# Patient Record
Sex: MALE | Race: ASIAN | NOT HISPANIC OR LATINO | URBAN - METROPOLITAN AREA
[De-identification: names, ages, dates, MRNs, and addresses within clinical notes are randomized per-mention and may not be internally consistent; named-entity substitution may affect disease eponyms.]

---

## 2019-03-30 ENCOUNTER — EMERGENCY (EMERGENCY)
Facility: HOSPITAL | Age: 24
LOS: 1 days | Discharge: HOME | End: 2019-03-30
Attending: EMERGENCY MEDICINE | Admitting: EMERGENCY MEDICINE
Payer: COMMERCIAL

## 2019-03-30 VITALS
DIASTOLIC BLOOD PRESSURE: 66 MMHG | SYSTOLIC BLOOD PRESSURE: 119 MMHG | RESPIRATION RATE: 18 BRPM | HEART RATE: 92 BPM | TEMPERATURE: 98 F | OXYGEN SATURATION: 100 %

## 2019-03-30 LAB
ALBUMIN SERPL ELPH-MCNC: 4.9 G/DL — SIGNIFICANT CHANGE UP (ref 3.3–5)
ALP SERPL-CCNC: 66 U/L — SIGNIFICANT CHANGE UP (ref 40–120)
ALT FLD-CCNC: 13 U/L — SIGNIFICANT CHANGE UP (ref 4–41)
ANION GAP SERPL CALC-SCNC: 14 MMO/L — SIGNIFICANT CHANGE UP (ref 7–14)
AST SERPL-CCNC: 23 U/L — SIGNIFICANT CHANGE UP (ref 4–40)
BILIRUB SERPL-MCNC: 0.5 MG/DL — SIGNIFICANT CHANGE UP (ref 0.2–1.2)
BUN SERPL-MCNC: 11 MG/DL — SIGNIFICANT CHANGE UP (ref 7–23)
CALCIUM SERPL-MCNC: 10 MG/DL — SIGNIFICANT CHANGE UP (ref 8.4–10.5)
CHLORIDE SERPL-SCNC: 101 MMOL/L — SIGNIFICANT CHANGE UP (ref 98–107)
CO2 SERPL-SCNC: 27 MMOL/L — SIGNIFICANT CHANGE UP (ref 22–31)
CREAT SERPL-MCNC: 1.04 MG/DL — SIGNIFICANT CHANGE UP (ref 0.5–1.3)
GLUCOSE SERPL-MCNC: 87 MG/DL — SIGNIFICANT CHANGE UP (ref 70–99)
HCT VFR BLD CALC: 49.4 % — SIGNIFICANT CHANGE UP (ref 39–50)
HGB BLD-MCNC: 15.9 G/DL — SIGNIFICANT CHANGE UP (ref 13–17)
MCHC RBC-ENTMCNC: 28.3 PG — SIGNIFICANT CHANGE UP (ref 27–34)
MCHC RBC-ENTMCNC: 32.2 % — SIGNIFICANT CHANGE UP (ref 32–36)
MCV RBC AUTO: 87.9 FL — SIGNIFICANT CHANGE UP (ref 80–100)
NRBC # FLD: 0 K/UL — SIGNIFICANT CHANGE UP (ref 0–0)
PLATELET # BLD AUTO: 164 K/UL — SIGNIFICANT CHANGE UP (ref 150–400)
PMV BLD: 9.8 FL — SIGNIFICANT CHANGE UP (ref 7–13)
POTASSIUM SERPL-MCNC: 4.9 MMOL/L — SIGNIFICANT CHANGE UP (ref 3.5–5.3)
POTASSIUM SERPL-SCNC: 4.9 MMOL/L — SIGNIFICANT CHANGE UP (ref 3.5–5.3)
PROT SERPL-MCNC: 7.9 G/DL — SIGNIFICANT CHANGE UP (ref 6–8.3)
RBC # BLD: 5.62 M/UL — SIGNIFICANT CHANGE UP (ref 4.2–5.8)
RBC # FLD: 12.3 % — SIGNIFICANT CHANGE UP (ref 10.3–14.5)
SODIUM SERPL-SCNC: 142 MMOL/L — SIGNIFICANT CHANGE UP (ref 135–145)
WBC # BLD: 8.33 K/UL — SIGNIFICANT CHANGE UP (ref 3.8–10.5)
WBC # FLD AUTO: 8.33 K/UL — SIGNIFICANT CHANGE UP (ref 3.8–10.5)

## 2019-03-30 PROCEDURE — 99284 EMERGENCY DEPT VISIT MOD MDM: CPT

## 2019-03-30 PROCEDURE — 70491 CT SOFT TISSUE NECK W/DYE: CPT | Mod: 26

## 2019-03-30 RX ORDER — ACETAMINOPHEN 500 MG
975 TABLET ORAL ONCE
Qty: 0 | Refills: 0 | Status: COMPLETED | OUTPATIENT
Start: 2019-03-30 | End: 2019-03-30

## 2019-03-30 RX ORDER — IBUPROFEN 200 MG
400 TABLET ORAL ONCE
Qty: 0 | Refills: 0 | Status: COMPLETED | OUTPATIENT
Start: 2019-03-30 | End: 2019-03-30

## 2019-03-30 RX ORDER — ACETAMINOPHEN 500 MG
1 TABLET ORAL
Qty: 50 | Refills: 0 | OUTPATIENT
Start: 2019-03-30

## 2019-03-30 RX ADMIN — Medication 975 MILLIGRAM(S): at 19:34

## 2019-03-30 RX ADMIN — Medication 400 MILLIGRAM(S): at 19:35

## 2019-03-30 RX ADMIN — Medication 300 MILLIGRAM(S): at 19:34

## 2019-03-30 NOTE — ED PROVIDER NOTE - NSFOLLOWUPINSTRUCTIONS_ED_ALL_ED_FT
Take medications as prescribed. Follow up with ENT about this issue (these issues): right supraclavicular abscess. Return if fevers or if pus re-accumulates. Take medications as prescribed. Keep are dry. Return in 2 days for wound check and packing change. Follow up with ENT about this issue (these issues): right supraclavicular abscess. Return if fevers or if pus re-accumulates.

## 2019-03-30 NOTE — ED PROVIDER NOTE - CLINICAL SUMMARY MEDICAL DECISION MAKING FREE TEXT BOX
22 y/o M with no significant PMHx presents to ED with a cyst on the right side of the neck since 3 weeks ago. Plan: obtain US and reassess. 22 y/o M with no significant PMHx presents to ED with a cyst on the right side of the neck x years, increasing in size/redness/warmth/tenderness since 3 weeks ago. DDx: infected branchial cleft cyst, scrofula, actinomycosis, abscess, necrotic LN. Plan: obtain US and reassess.

## 2019-03-30 NOTE — ED ADULT TRIAGE NOTE - CHIEF COMPLAINT QUOTE
Pt sent by PCP for eval of cyst vs lipoma to right side of neck. Pt reports swelling x 1 year, more severe and painful in the past 2 weeks. Pt also states "I have stitches in my side that I never got removed". Noted with healed incision, states he was supposed to have stitches removed 2 weeks after and never went, this was 2 years ago.

## 2019-04-01 ENCOUNTER — EMERGENCY (EMERGENCY)
Facility: HOSPITAL | Age: 24
LOS: 1 days | Discharge: HOME | End: 2019-04-01
Attending: EMERGENCY MEDICINE | Admitting: EMERGENCY MEDICINE

## 2019-04-01 VITALS
HEART RATE: 62 BPM | OXYGEN SATURATION: 98 % | RESPIRATION RATE: 18 BRPM | DIASTOLIC BLOOD PRESSURE: 57 MMHG | TEMPERATURE: 98 F | SYSTOLIC BLOOD PRESSURE: 115 MMHG

## 2019-04-01 NOTE — ED PROVIDER NOTE - OBJECTIVE STATEMENT
22 y/o male with no pertinent PMHx presents to the ED for a wound check. Pt states having a wound on the R shoulder, Pt notes having an abscess that was drained x 2 days ago with packing in placed. Since then Pt denies any pain, N/V, fever, or chills. Of note Pt taking a Abx for wound.

## 2019-04-02 PROBLEM — Z78.9 OTHER SPECIFIED HEALTH STATUS: Chronic | Status: ACTIVE | Noted: 2019-03-30

## 2019-10-24 NOTE — ED PROVIDER NOTE - CROS ED ROS STATEMENT
----- Message from Jostin Acosta sent at 10/24/2019 10:09 AM CDT -----  Contact: Pt/Mom  Please give mom a call back at .[phone] (home) she is calling to have the pt nurse visit rescheduled.   all other ROS negative except as per HPI

## 2022-02-24 ENCOUNTER — OUTPATIENT (OUTPATIENT)
Dept: OUTPATIENT SERVICES | Facility: HOSPITAL | Age: 27
LOS: 1 days | Discharge: HOME | End: 2022-02-24

## 2022-03-02 ENCOUNTER — OUTPATIENT (OUTPATIENT)
Dept: OUTPATIENT SERVICES | Facility: HOSPITAL | Age: 27
LOS: 1 days | Discharge: HOME | End: 2022-03-02

## 2022-03-03 DIAGNOSIS — F12.20 CANNABIS DEPENDENCE, UNCOMPLICATED: ICD-10-CM

## 2022-03-03 DIAGNOSIS — F15.20 OTHER STIMULANT DEPENDENCE, UNCOMPLICATED: ICD-10-CM

## 2022-03-08 NOTE — ED PROVIDER NOTE - PRINCIPAL DIAGNOSIS
Chief Complaint  Hypertension (Follow up ), Hyperlipidemia, and Diabetes    Subjective  Patient is a 50-year-old male who is here today to follow-up on type 2 diabetes.  He takes Metformin 1000 mg twice daily, Januvia 100 mg once daily, and glipizide 5 mg once daily.  He is reporting some blood sugars as low as 60 intermittently.  He would like to see about stopping the glipizide but continuing the Metformin and Januvia.  He has had some success with intentional weight loss and his diet has improved.  His last hemoglobin A1c was 6.2%    For hyperlipidemia he takes simvastatin 20 mg daily.  He denies side effects and requests refills.    Cardiology manages his hypertension.  He has not had colon cancer screening.  His family history is negative for polyps or colon cancer.  Discuss colonoscopy versus Cologuard testing advantages and disadvantages of each test.  He will let me know if he wants a referral for colon cancer screening.    Family history is negative for prostate cancer.  He denies any difficulty with starting or stopping his urinary stream or dysuria.        Moe SIMMONS Kaylee presents to [facility]  History of Present Illness    Past Medical History:   Diagnosis Date   • Abnormal results of liver function studies    • COVID-19 10/2020   • Enlarged aorta (HCC)    • Essential (primary) hypertension    • Mixed hyperlipidemia    • Obstructive sleep apnea (adult) (pediatric)     SLEEP STUDY   • Type 2 diabetes mellitus with hyperglycemia (HCC)    • Vitamin D deficiency        No Known Allergies     Past Surgical History:   Procedure Laterality Date   • FOOT FRACTURE SURGERY Left 1999   • ROTATOR CUFF REPAIR  2006        Social History     Tobacco Use   • Smoking status: Former Smoker     Packs/day: 1.00     Years: 8.00     Pack years: 8.00     Types: Cigarettes     Start date:      Quit date:      Years since quittin.2   • Smokeless tobacco: Never Used  "  Substance Use Topics   • Alcohol use: Yes     Comment: occassionally        Family History   Problem Relation Age of Onset   • Diabetes type II Mother    • Hypertension Father         Last Completed Pap Smear     This patient has no relevant Health Maintenance data.          Last Completed Mammogram     This patient has no relevant Health Maintenance data.          Last Completed Colonoscopy     This patient has no relevant Health Maintenance data.            Current Outpatient Medications:   •  albuterol sulfate  (90 Base) MCG/ACT inhaler, Inhale 2 puffs Every 4 (Four) Hours As Needed., Disp: , Rfl:   •  amLODIPine (NORVASC) 5 MG tablet, Take 5 mg by mouth Daily., Disp: , Rfl:   •  lisinopril (PRINIVIL,ZESTRIL) 40 MG tablet, Take 40 mg by mouth Daily., Disp: , Rfl:   •  metFORMIN (GLUCOPHAGE) 1000 MG tablet, Take 1 tablet by mouth 2 (Two) Times a Day., Disp: 180 tablet, Rfl: 1  •  multivitamin with minerals tablet tablet, Take 1 tablet by mouth Daily., Disp: , Rfl:   •  simvastatin (ZOCOR) 20 MG tablet, Take 1 tablet by mouth Daily., Disp: 90 tablet, Rfl: 1  •  SITagliptin (Januvia) 100 MG tablet, Take 1 tablet by mouth Daily., Disp: 90 tablet, Rfl: 1  •  glipizide (GLUCOTROL) 5 MG tablet, TAKE ONE TABLET BY MOUTH EVERY DAY, Disp: 90 tablet, Rfl: 0    Immunization History   Administered Date(s) Administered   • Tdap 09/24/2018         Review of Systems   Constitutional: Negative.    Respiratory: Negative.    Cardiovascular: Negative.    Gastrointestinal: Negative.    Genitourinary: Negative.    Psychiatric/Behavioral: Negative.         Objective       /76 (BP Location: Left arm, Patient Position: Sitting, Cuff Size: Large Adult)   Pulse 72   Ht 180.3 cm (71\")   Wt 121 kg (266 lb 12.8 oz)   SpO2 97%   BMI 37.21 kg/m²         Physical Exam  Vitals reviewed.   Constitutional:       General: He is not in acute distress.     Appearance: Normal appearance. He is well-developed.   Cardiovascular:      " Rate and Rhythm: Normal rate and regular rhythm.      Heart sounds: Normal heart sounds.   Pulmonary:      Effort: Pulmonary effort is normal.      Breath sounds: Normal breath sounds.   Musculoskeletal:      Right lower leg: No edema.      Left lower leg: No edema.   Skin:     General: Skin is warm and dry.   Neurological:      General: No focal deficit present.      Mental Status: He is alert.   Psychiatric:         Attention and Perception: Attention normal.         Mood and Affect: Mood and affect normal.         Behavior: Behavior normal.           Result Review :     The following data was reviewed by: LUPILLO Larson on 03/08/2022:    CMP    CMP 8/10/21 3/8/22   Glucose 119 (A) 135 (A)   BUN 12 9   Creatinine 0.88 0.74 (A)   eGFR Non African Am 92    Sodium 139 140   Potassium 4.6 4.5   Chloride 104 103   Calcium 9.3 9.7   Albumin 4.60 4.70   Total Bilirubin 0.4 0.5   Alkaline Phosphatase 62 62   AST (SGOT) 22 19   ALT (SGPT) 22 23   (A) Abnormal value                  Lipid Panel    Lipid Panel 8/10/21 3/8/22   Total Cholesterol 146 139   Triglycerides 60 49   HDL Cholesterol 47 52   VLDL Cholesterol 12 11   LDL Cholesterol  87 76   LDL/HDL Ratio 1.85 1.48               Most Recent A1C    HGBA1C Most Recent 3/8/22   Hemoglobin A1C 6.80 (A)   (A) Abnormal value                            Assessment and Plan        Diagnoses and all orders for this visit:    1. Type 2 diabetes mellitus without complication, without long-term current use of insulin (HCC) (Primary)  Assessment & Plan:  As long as hemoglobin A1c is less than 7%, will anticipate he will stop glipizide and remain on Metformin and Januvia with a repeat of his labs in 3 months.  Further direction will be provided after reviewing his labs.    Orders:  -     Comprehensive metabolic panel; Future  -     Hemoglobin A1c; Future  -     Lipid panel; Future  -     metFORMIN (GLUCOPHAGE) 1000 MG tablet; Take 1 tablet by mouth 2 (Two) Times a Day.   Dispense: 180 tablet; Refill: 1  -     SITagliptin (Januvia) 100 MG tablet; Take 1 tablet by mouth Daily.  Dispense: 90 tablet; Refill: 1    2. Mixed hyperlipidemia  -     simvastatin (ZOCOR) 20 MG tablet; Take 1 tablet by mouth Daily.  Dispense: 90 tablet; Refill: 1    3. Essential hypertension  Assessment & Plan:  Continue care per cardiology                Follow Up     No follow-ups on file.    Patient was given instructions and counseling regarding his condition or for health maintenance advice. Please see specific information pulled into the AVS if appropriate.               Neck abscess

## 2022-08-03 ENCOUNTER — EMERGENCY (EMERGENCY)
Facility: HOSPITAL | Age: 27
LOS: 1 days | Discharge: HOME | End: 2022-08-03
Attending: EMERGENCY MEDICINE | Admitting: EMERGENCY MEDICINE

## 2022-08-03 VITALS
RESPIRATION RATE: 17 BRPM | HEART RATE: 64 BPM | DIASTOLIC BLOOD PRESSURE: 53 MMHG | TEMPERATURE: 98 F | OXYGEN SATURATION: 100 % | SYSTOLIC BLOOD PRESSURE: 93 MMHG

## 2022-08-03 LAB — PCP SPEC-MCNC: SIGNIFICANT CHANGE UP

## 2022-08-03 PROCEDURE — 99284 EMERGENCY DEPT VISIT MOD MDM: CPT

## 2022-08-03 NOTE — ED PROVIDER NOTE - PATIENT PORTAL LINK FT
You can access the FollowMyHealth Patient Portal offered by [facility] by registering at the following website: [email or web address]. By joining [employer]’s FollowMyHealth portal, you will also be able to view your health information using other applications (apps) compatible with our system.

## 2022-08-03 NOTE — ED ADULT TRIAGE NOTE - CHIEF COMPLAINT QUOTE
Pt. states 10 minutes PTA, he started feeling lightheaded. Endorse feeling SOB. Denies chest pain, Denies past medical history. EKG in progress. Patient has flat affect in triage, asking "Are they going to cut me open?" Patient deneis any drug or alcohol use. Pt. states 10 minutes PTA, he started feeling lightheaded. Endorse feeling SOB. Denies chest pain, Denies past medical history. EKG in progress. Patient has flat affect in triage, asking "Are they going to cut me open?" Patient deneis any drug or alcohol use.  ADDENDUM: MD Ramesh called for psych evaluation, asper MD Ramesh patient to be seen in .
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
## 2022-08-03 NOTE — ED PROVIDER NOTE - PROGRESS NOTE DETAILS
jaycee: pt stable, calm and collected, received collatoral via brother who states pt can f/u with pmd and is no harm to self or others. pt provided crisis center f/u for tomorrow am appt.

## 2022-08-03 NOTE — ED BEHAVIORAL HEALTH NOTE - BEHAVIORAL HEALTH NOTE
As per request of provider, writer contacted patient’s brother Rocky howell ([phone]) for collateral information. The following information is per the brother.    Patient is a 25 YO male domiciled w/ girlfriend. Patient BIB taxi paid for by brother due to feeling dizzy, fast heart rate, didn’t feel well and wanted to go to the hospital. Patient had called the brother when he started to feel this way. Brother reports patient has no psych hx and is not currently in treatment. Patient has a hx of anxiety. Patient did not endorse any si/hi/avh and has no hx of this. 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 Prior to today, the brother saw the patient a few days ago and reports he seemed fine. Patient has been eating and showering. Patient’s sleep is poor. Sometimes the patient sleeps too much and sometimes not enough as per the brother. No medical problems reported, and patient is covid vaccinated. Brother reports patient has gained 20 lbs over the past 2 months. Brother has no safety concerns and feels the patient is able to care for himself. Brother reports he can pick the patient up when cleared for discharge.
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
## 2022-08-03 NOTE — ED ADULT NURSE NOTE - CHIEF COMPLAINT QUOTE
Pt. states 10 minutes PTA, he started feeling lightheaded. Endorse feeling SOB. Denies chest pain, Denies past medical history. EKG in progress. Patient has flat affect in triage, asking "Are they going to cut me open?" Patient deneis any drug or alcohol use.  ADDENDUM: MD Ramesh called for psych evaluation, asper MD Ramesh patient to be seen in .
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
## 2022-08-04 VITALS
SYSTOLIC BLOOD PRESSURE: 105 MMHG | OXYGEN SATURATION: 100 % | DIASTOLIC BLOOD PRESSURE: 62 MMHG | HEART RATE: 62 BPM | RESPIRATION RATE: 18 BRPM

## 2022-08-04 LAB
AMPHET UR-MCNC: NEGATIVE — SIGNIFICANT CHANGE UP
BARBITURATES UR SCN-MCNC: NEGATIVE — SIGNIFICANT CHANGE UP
BENZODIAZ UR-MCNC: NEGATIVE — SIGNIFICANT CHANGE UP
COCAINE METAB.OTHER UR-MCNC: NEGATIVE — SIGNIFICANT CHANGE UP
CREATININE URINE RESULT, DAU: 241 MG/DL — SIGNIFICANT CHANGE UP
METHADONE UR-MCNC: NEGATIVE — SIGNIFICANT CHANGE UP
OPIATES UR-MCNC: NEGATIVE — SIGNIFICANT CHANGE UP
OXYCODONE UR-MCNC: NEGATIVE — SIGNIFICANT CHANGE UP
PCP UR-MCNC: NEGATIVE — SIGNIFICANT CHANGE UP
THC UR QL: NEGATIVE — SIGNIFICANT CHANGE UP

## 2022-08-04 NOTE — ED ADULT NURSE REASSESSMENT NOTE - NS ED NURSE REASSESS COMMENT FT1
Pt denies any complaints at this time, belongings returned, DC papers provided, and appears in no obv distress. Pt states "I live like 5 minutes away so ill walk home."

## 2022-08-10 ENCOUNTER — EMERGENCY (EMERGENCY)
Facility: HOSPITAL | Age: 27
LOS: 1 days | Discharge: HOME | End: 2022-08-10
Attending: EMERGENCY MEDICINE | Admitting: EMERGENCY MEDICINE

## 2022-08-10 VITALS
SYSTOLIC BLOOD PRESSURE: 117 MMHG | HEART RATE: 84 BPM | TEMPERATURE: 98 F | RESPIRATION RATE: 18 BRPM | DIASTOLIC BLOOD PRESSURE: 65 MMHG | OXYGEN SATURATION: 99 %

## 2022-08-10 PROCEDURE — 99284 EMERGENCY DEPT VISIT MOD MDM: CPT

## 2022-08-10 PROCEDURE — 93971 EXTREMITY STUDY: CPT | Mod: 26,LT

## 2022-08-10 RX ORDER — ACETAMINOPHEN 500 MG
975 TABLET ORAL ONCE
Refills: 0 | Status: COMPLETED | OUTPATIENT
Start: 2022-08-10 | End: 2022-08-10

## 2022-08-10 RX ADMIN — Medication 975 MILLIGRAM(S): at 20:58

## 2022-08-10 NOTE — ED PROVIDER NOTE - PHYSICAL EXAMINATION
GEN - NAD; well appearing; A+O x3   HEAD - NC/AT   EYES- PERRL, EOMI  ENT: Airway patent, mmm  NECK: Neck supple  PULMONARY - CTA b/l, symmetric breath sounds.   CARDIAC -s1s2, RRR, no M,G,R  EXTREMITIES - FROM, symmetric pulses, capillary refill < 2 seconds, RLE slightly larger than LLE with scattered area of hypopigmentation, WWP, no erythema, no pitting edema, DP pulse 2+, SILT throughout   NEUROLOGIC - alert, speech clear, full strength and sensation to RLE  PSYCH -nl mood/affect, nl insight.

## 2022-08-10 NOTE — ED ADULT NURSE NOTE - OBJECTIVE STATEMENT
Received pt in intake room 15. pt A&OX3, ambulatory. pt c/o right leg pain. pt with discoloration to the right leg states has been there x a few years, with mild swelling. pt is concerned has a blood clot. pt in no distress. denies any palpitations, chest pain, sob, cough, fever/chills, headache, dizziness, n/v. respirations are equal and nonlabored, no respiratory distress noted. pt stable, medicated as per orders. awaiting ultrasound.

## 2022-08-10 NOTE — ED PROVIDER NOTE - CLINICAL SUMMARY MEDICAL DECISION MAKING FREE TEXT BOX
Patient presents with 1 year of right lower extremity swelling and discoloration, atraumatic.  Neurovascular intact, plan for ultrasound to eval for DVT, vascular follow-up.

## 2022-08-10 NOTE — ED PROVIDER NOTE - PATIENT PORTAL LINK FT
You can access the FollowMyHealth Patient Portal offered by [facility] by registering at the following website: [email or web address]. By joining [employer]’s FollowMyHealth portal, you will also be able to view your health information using other applications (apps) compatible with our system.

## 2022-08-10 NOTE — ED ADULT TRIAGE NOTE - CHIEF COMPLAINT QUOTE
pt co pain to right leg states his legs have had discoloration x few years. Pt with mild swelling to right leg. pt concern that he may have a blood clot in right leg. Pt denies chest pain or sob.

## 2022-08-10 NOTE — ED PROVIDER NOTE - NSFOLLOWUPINSTRUCTIONS_ED_ALL_ED_FT
Your ultrasound did not show any signs of a blood clot, you will be contacted by our discharge planner to help you get follow-up with a vascular doctor to further evaluate your leg pain and skin changes.

## 2022-08-10 NOTE — ED PROVIDER NOTE - OBJECTIVE STATEMENT
26 male presents with right leg swelling and discoloration x1 year.  Denies any injuries, denies numbness tingling or weakness, denies trauma.  Has not seen a doctor for this issue before.  Patient reports he is concerned that he may have a blood clot and asked why he came to the ER tonight.  Denies chest pain, shortness of breath, fevers chills sweats, cough, back pain.

## 2022-10-26 NOTE — ED PROVIDER NOTE - CPE EDP PSYCH NORM
Other (Free Text): 2 Samples of cosentyx given to patient Detail Level: Zone Note Text (......Xxx Chief Complaint.): This diagnosis correlates with the Render Risk Assessment In Note?: no normal...

## 2023-03-30 ENCOUNTER — NON-APPOINTMENT (OUTPATIENT)
Age: 28
End: 2023-03-30

## 2023-07-01 ENCOUNTER — INPATIENT (INPATIENT)
Age: 28
LOS: 3 days | Discharge: HOME | End: 2023-07-05
Attending: STUDENT IN AN ORGANIZED HEALTH CARE EDUCATION/TRAINING PROGRAM | Admitting: STUDENT IN AN ORGANIZED HEALTH CARE EDUCATION/TRAINING PROGRAM
Payer: COMMERCIAL

## 2023-07-01 VITALS
RESPIRATION RATE: 19 BRPM | OXYGEN SATURATION: 99 % | SYSTOLIC BLOOD PRESSURE: 137 MMHG | DIASTOLIC BLOOD PRESSURE: 80 MMHG | TEMPERATURE: 99 F | HEART RATE: 97 BPM

## 2023-07-01 DIAGNOSIS — M79.89 OTHER SPECIFIED SOFT TISSUE DISORDERS: ICD-10-CM

## 2023-07-01 DIAGNOSIS — L03.90 CELLULITIS, UNSPECIFIED: ICD-10-CM

## 2023-07-01 LAB
ALBUMIN SERPL ELPH-MCNC: 4.4 G/DL — SIGNIFICANT CHANGE UP (ref 3.3–5)
ALP SERPL-CCNC: 79 U/L — SIGNIFICANT CHANGE UP (ref 40–120)
ALT FLD-CCNC: 19 U/L — SIGNIFICANT CHANGE UP (ref 4–41)
ANION GAP SERPL CALC-SCNC: 14 MMOL/L — SIGNIFICANT CHANGE UP (ref 7–14)
APAP SERPL-MCNC: <10 UG/ML — LOW (ref 15–25)
AST SERPL-CCNC: 22 U/L — SIGNIFICANT CHANGE UP (ref 4–40)
BASE EXCESS BLDV CALC-SCNC: 1.3 MMOL/L — SIGNIFICANT CHANGE UP (ref -2–3)
BASOPHILS # BLD AUTO: 0.05 K/UL — SIGNIFICANT CHANGE UP (ref 0–0.2)
BASOPHILS NFR BLD AUTO: 0.5 % — SIGNIFICANT CHANGE UP (ref 0–2)
BILIRUB SERPL-MCNC: 0.3 MG/DL — SIGNIFICANT CHANGE UP (ref 0.2–1.2)
BLOOD GAS VENOUS COMPREHENSIVE RESULT: SIGNIFICANT CHANGE UP
BUN SERPL-MCNC: 16 MG/DL — SIGNIFICANT CHANGE UP (ref 7–23)
CALCIUM SERPL-MCNC: 9.1 MG/DL — SIGNIFICANT CHANGE UP (ref 8.4–10.5)
CHLORIDE BLDV-SCNC: 104 MMOL/L — SIGNIFICANT CHANGE UP (ref 96–108)
CHLORIDE SERPL-SCNC: 102 MMOL/L — SIGNIFICANT CHANGE UP (ref 98–107)
CO2 BLDV-SCNC: 29.7 MMOL/L — HIGH (ref 22–26)
CO2 SERPL-SCNC: 24 MMOL/L — SIGNIFICANT CHANGE UP (ref 22–31)
CREAT SERPL-MCNC: 1.05 MG/DL — SIGNIFICANT CHANGE UP (ref 0.5–1.3)
CRP SERPL-MCNC: 5.1 MG/L — HIGH
D DIMER BLD IA.RAPID-MCNC: <150 NG/ML DDU — SIGNIFICANT CHANGE UP
EGFR: 100 ML/MIN/1.73M2 — SIGNIFICANT CHANGE UP
EOSINOPHIL # BLD AUTO: 0.46 K/UL — SIGNIFICANT CHANGE UP (ref 0–0.5)
EOSINOPHIL NFR BLD AUTO: 4.2 % — SIGNIFICANT CHANGE UP (ref 0–6)
ERYTHROCYTE [SEDIMENTATION RATE] IN BLOOD: 4 MM/HR — SIGNIFICANT CHANGE UP (ref 1–15)
ETHANOL SERPL-MCNC: <10 MG/DL — SIGNIFICANT CHANGE UP
GAS PNL BLDV: 137 MMOL/L — SIGNIFICANT CHANGE UP (ref 136–145)
GLUCOSE BLDV-MCNC: 109 MG/DL — HIGH (ref 70–99)
GLUCOSE SERPL-MCNC: 101 MG/DL — HIGH (ref 70–99)
HCO3 BLDV-SCNC: 28 MMOL/L — SIGNIFICANT CHANGE UP (ref 22–29)
HCT VFR BLD CALC: 44.7 % — SIGNIFICANT CHANGE UP (ref 39–50)
HCT VFR BLDA CALC: 44 % — SIGNIFICANT CHANGE UP (ref 39–51)
HGB BLD CALC-MCNC: 14.6 G/DL — SIGNIFICANT CHANGE UP (ref 12.6–17.4)
HGB BLD-MCNC: 14.2 G/DL — SIGNIFICANT CHANGE UP (ref 13–17)
IANC: 7.51 K/UL — HIGH (ref 1.8–7.4)
IMM GRANULOCYTES NFR BLD AUTO: 0.3 % — SIGNIFICANT CHANGE UP (ref 0–0.9)
LACTATE BLDV-MCNC: 1.7 MMOL/L — SIGNIFICANT CHANGE UP (ref 0.5–2)
LYMPHOCYTES # BLD AUTO: 19.6 % — SIGNIFICANT CHANGE UP (ref 13–44)
LYMPHOCYTES # BLD AUTO: 2.14 K/UL — SIGNIFICANT CHANGE UP (ref 1–3.3)
MCHC RBC-ENTMCNC: 27.5 PG — SIGNIFICANT CHANGE UP (ref 27–34)
MCHC RBC-ENTMCNC: 31.8 GM/DL — LOW (ref 32–36)
MCV RBC AUTO: 86.5 FL — SIGNIFICANT CHANGE UP (ref 80–100)
MONOCYTES # BLD AUTO: 0.71 K/UL — SIGNIFICANT CHANGE UP (ref 0–0.9)
MONOCYTES NFR BLD AUTO: 6.5 % — SIGNIFICANT CHANGE UP (ref 2–14)
NEUTROPHILS # BLD AUTO: 7.51 K/UL — HIGH (ref 1.8–7.4)
NEUTROPHILS NFR BLD AUTO: 68.9 % — SIGNIFICANT CHANGE UP (ref 43–77)
NRBC # BLD: 0 /100 WBCS — SIGNIFICANT CHANGE UP (ref 0–0)
NRBC # FLD: 0 K/UL — SIGNIFICANT CHANGE UP (ref 0–0)
PCO2 BLDV: 52 MMHG — SIGNIFICANT CHANGE UP (ref 42–55)
PH BLDV: 7.34 — SIGNIFICANT CHANGE UP (ref 7.32–7.43)
PLATELET # BLD AUTO: 355 K/UL — SIGNIFICANT CHANGE UP (ref 150–400)
PO2 BLDV: 36 MMHG — SIGNIFICANT CHANGE UP (ref 25–45)
POTASSIUM BLDV-SCNC: 3.5 MMOL/L — SIGNIFICANT CHANGE UP (ref 3.5–5.1)
POTASSIUM SERPL-MCNC: 3.5 MMOL/L — SIGNIFICANT CHANGE UP (ref 3.5–5.3)
POTASSIUM SERPL-SCNC: 3.5 MMOL/L — SIGNIFICANT CHANGE UP (ref 3.5–5.3)
PROCALCITONIN SERPL-MCNC: 0.03 NG/ML — SIGNIFICANT CHANGE UP (ref 0.02–0.1)
PROT SERPL-MCNC: 7.2 G/DL — SIGNIFICANT CHANGE UP (ref 6–8.3)
RBC # BLD: 5.17 M/UL — SIGNIFICANT CHANGE UP (ref 4.2–5.8)
RBC # FLD: 13.2 % — SIGNIFICANT CHANGE UP (ref 10.3–14.5)
SALICYLATES SERPL-MCNC: <0.3 MG/DL — LOW (ref 15–30)
SAO2 % BLDV: 58 % — LOW (ref 67–88)
SODIUM SERPL-SCNC: 140 MMOL/L — SIGNIFICANT CHANGE UP (ref 135–145)
TOXICOLOGY SCREEN, DRUGS OF ABUSE, SERUM RESULT: SIGNIFICANT CHANGE UP
TOXICOLOGY SCREEN, DRUGS OF ABUSE, SERUM RESULT: SIGNIFICANT CHANGE UP
WBC # BLD: 10.9 K/UL — HIGH (ref 3.8–10.5)
WBC # FLD AUTO: 10.9 K/UL — HIGH (ref 3.8–10.5)

## 2023-07-01 PROCEDURE — 73590 X-RAY EXAM OF LOWER LEG: CPT | Mod: 26,RT

## 2023-07-01 PROCEDURE — 99285 EMERGENCY DEPT VISIT HI MDM: CPT

## 2023-07-01 PROCEDURE — 99223 1ST HOSP IP/OBS HIGH 75: CPT

## 2023-07-01 RX ORDER — ENOXAPARIN SODIUM 100 MG/ML
95 INJECTION SUBCUTANEOUS EVERY 12 HOURS
Refills: 0 | Status: DISCONTINUED | OUTPATIENT
Start: 2023-07-01 | End: 2023-07-03

## 2023-07-01 RX ORDER — VANCOMYCIN HCL 1 G
1000 VIAL (EA) INTRAVENOUS ONCE
Refills: 0 | Status: COMPLETED | OUTPATIENT
Start: 2023-07-01 | End: 2023-07-01

## 2023-07-01 RX ORDER — PIPERACILLIN AND TAZOBACTAM 4; .5 G/20ML; G/20ML
3.38 INJECTION, POWDER, LYOPHILIZED, FOR SOLUTION INTRAVENOUS ONCE
Refills: 0 | Status: COMPLETED | OUTPATIENT
Start: 2023-07-01 | End: 2023-07-01

## 2023-07-01 RX ORDER — LANOLIN ALCOHOL/MO/W.PET/CERES
3 CREAM (GRAM) TOPICAL AT BEDTIME
Refills: 0 | Status: DISCONTINUED | OUTPATIENT
Start: 2023-07-01 | End: 2023-07-05

## 2023-07-01 RX ORDER — ENOXAPARIN SODIUM 100 MG/ML
40 INJECTION SUBCUTANEOUS EVERY 24 HOURS
Refills: 0 | Status: DISCONTINUED | OUTPATIENT
Start: 2023-07-01 | End: 2023-07-01

## 2023-07-01 RX ORDER — ACETAMINOPHEN 500 MG
650 TABLET ORAL EVERY 6 HOURS
Refills: 0 | Status: DISCONTINUED | OUTPATIENT
Start: 2023-07-01 | End: 2023-07-05

## 2023-07-01 RX ORDER — VANCOMYCIN HCL 1 G
1250 VIAL (EA) INTRAVENOUS EVERY 12 HOURS
Refills: 0 | Status: DISCONTINUED | OUTPATIENT
Start: 2023-07-01 | End: 2023-07-03

## 2023-07-01 RX ORDER — PIPERACILLIN AND TAZOBACTAM 4; .5 G/20ML; G/20ML
3.38 INJECTION, POWDER, LYOPHILIZED, FOR SOLUTION INTRAVENOUS EVERY 8 HOURS
Refills: 0 | Status: DISCONTINUED | OUTPATIENT
Start: 2023-07-01 | End: 2023-07-04

## 2023-07-01 RX ORDER — ONDANSETRON 8 MG/1
4 TABLET, FILM COATED ORAL EVERY 8 HOURS
Refills: 0 | Status: DISCONTINUED | OUTPATIENT
Start: 2023-07-01 | End: 2023-07-05

## 2023-07-01 RX ADMIN — Medication 250 MILLIGRAM(S): at 23:38

## 2023-07-01 RX ADMIN — PIPERACILLIN AND TAZOBACTAM 25 GRAM(S): 4; .5 INJECTION, POWDER, LYOPHILIZED, FOR SOLUTION INTRAVENOUS at 19:10

## 2023-07-01 RX ADMIN — Medication 250 MILLIGRAM(S): at 05:01

## 2023-07-01 RX ADMIN — PIPERACILLIN AND TAZOBACTAM 200 GRAM(S): 4; .5 INJECTION, POWDER, LYOPHILIZED, FOR SOLUTION INTRAVENOUS at 04:24

## 2023-07-01 RX ADMIN — ENOXAPARIN SODIUM 95 MILLIGRAM(S): 100 INJECTION SUBCUTANEOUS at 23:38

## 2023-07-01 NOTE — H&P ADULT - NSHPREVIEWOFSYSTEMS_GEN_ALL_CORE
Review of Systems:   CONSTITUTIONAL: No fever, weight loss, or fatigue  EYES: No eye pain, visual disturbances, or discharge  ENMT:  No difficulty hearing, tinnitus, vertigo; No sinus or throat pain  NECK: No pain or stiffness  BREASTS: No pain, masses, or nipple discharge  RESPIRATORY: No cough, wheezing, chills or hemoptysis; No shortness of breath  CARDIOVASCULAR: No chest pain, palpitations, dizziness, or leg swelling  GASTROINTESTINAL: No abdominal or epigastric pain. No nausea, vomiting, or hematemesis; No diarrhea or constipation. No melena or hematochezia.  GENITOURINARY: No dysuria, frequency, hematuria, or incontinence  NEUROLOGICAL: No headaches, memory loss, loss of strength, numbness, or tremors  SKIN: No itching, burning, rashes, or lesions   LYMPH NODES: No enlarged glands  ENDOCRINE: No heat or cold intolerance; No hair loss  MUSCULOSKELETAL: No joint pain or swelling; No muscle, back, or extremity pain  PSYCHIATRIC: No depression, anxiety, mood swings, or difficulty sleeping  HEME/LYMPH: No easy bruising, or bleeding gums  ALLERY AND IMMUNOLOGIC: No hives or eczema

## 2023-07-01 NOTE — H&P ADULT - HISTORY OF PRESENT ILLNESS
Pt is a 26 yo M w/ no PMHx p/w RT LE swelling. Pt states he noted a few months ago that he had a blister on anterior RT shin which burst. He states he went to [facility] and was prescribed antibiotics. He is unclear if he finished his course of antibiotics. He noted his wounds were getting worse and went to a wound care doctor who prescribed him a course of doxcycline. He comes in today due to worsening pain and swelling.

## 2023-07-01 NOTE — ED ADULT NURSE NOTE - OBJECTIVE STATEMENT
Pt arrives to ED aaox4, ambulatory, breathing even and unlabored in bed. Pt came to ED for infection on right calf. Pt poor historian, pt states he "just woke up with his leg like this" a few weeks ago. Pt states he was given antibiotics for this infection and they "did not work". Pt unable to state what infection he had or what he was treated with. Pt denies chest pain, SOB, dizziness, headache, blurry vision, chills. #18G IV placed in right AC, labs drawn and sent. Bed in lowest position, call bell within reach.

## 2023-07-01 NOTE — H&P ADULT - TIME BILLING
Reviewed lab data, radiology results, consultants' recommendations, documentation in [place], discussed with family, ACP, interdisciplinary staff and/or intervention were performed. d/w ACP Ernie

## 2023-07-01 NOTE — ED PROVIDER NOTE - PHYSICAL EXAMINATION
Skin: eczematous rash noted in suprapubic region and L posterior aspect of upper neck; apparent eczematous rash to anterior aspect of proximal R ankle   - R lower leg with open abrasion to mid- anterior tib/fib area with non-pitting swelling to level of knee, dark discoloration to leg from ankle to upper-mid lower leg   - distal pulses 2+ b/l
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
## 2023-07-01 NOTE — ED ADULT NURSE NOTE - NSFALLUNIVINTERV_ED_ALL_ED
Bed/Stretcher in lowest position, wheels locked, appropriate side rails in place/Call bell, personal items and telephone in reach/Instruct patient to call for assistance before getting out of bed/chair/stretcher/Non-slip footwear applied when patient is off stretcher/[place] to call system/Physically safe environment - no spills, clutter or unnecessary equipment/Purposeful proactive rounding/Room/bathroom lighting operational, light cord in reach

## 2023-07-01 NOTE — H&P ADULT - NSHPLABSRESULTS_GEN_ALL_CORE
14.2   10.90 )-----------( 355      ( 01 Jul 2023 04:20 )             44.7       07-01    140  |  102  |  16  ----------------------------<  101<H>  3.5   |  24  |  1.05    Ca    9.1      01 Jul 2023 04:20    TPro  7.2  /  Alb  4.4  /  TBili  0.3  /  DBili  x   /  AST  22  /  ALT  19  /  AlkPhos  79  07-01      CAPILLARY BLOOD GLUCOSE          Urinalysis Basic - ( 01 Jul 2023 04:20 )    Color: x / Appearance: x / SG: x / pH: x  Gluc: 101 mg/dL / Ketone: x  / Bili: x / Urobili: x   Blood: x / Protein: x / Nitrite: x   Leuk Esterase: x / RBC: x / WBC x   Sq Epi: x / Non Sq Epi: x / Bacteria: x            Radiology  < from: Xray Tibia + Fibula 2 Views, Right (07.01.23 @ 05:19) >    IMPRESSION:  No radiographic evidence of osteomyelitis.

## 2023-07-01 NOTE — H&P ADULT - ASSESSMENT
Pt is a 26 yo M w/ no PMHx p/w RT LE swelling.  Pt is a 26 yo M w/ no PMHx p/w RT LE swelling and multiple ulcers. + hx drug use

## 2023-07-01 NOTE — H&P ADULT - NSHPPHYSICALEXAM_GEN_ALL_CORE
Vital Signs Last 24 Hrs  T(C): 36.7 (01 Jul 2023 13:22), Max: 37.2 (01 Jul 2023 03:05)  T(F): 98.1 (01 Jul 2023 13:22), Max: 99 (01 Jul 2023 03:05)  HR: 70 (01 Jul 2023 13:22) (70 - 97)  BP: 123/71 (01 Jul 2023 13:22) (122/76 - 137/80)  BP(mean): --  RR: 20 (01 Jul 2023 13:22) (15 - 20)  SpO2: 100% (01 Jul 2023 13:22) (99% - 100%)    Parameters below as of 01 Jul 2023 13:22  Patient On (Oxygen Delivery Method): room air        PHYSICAL EXAM:  GENERAL: NAD, well-developed  HEAD:  Atraumatic, Normocephalic  EYES: EOMI, PERRLA, conjunctiva and sclera clear  NECK: Supple, No JVD  CHEST/LUNG: Clear to auscultation bilaterally; No wheeze  HEART: Regular rate and rhythm; No murmurs, rubs, or gallops  ABDOMEN: Soft, Nontender, Nondistended; Bowel sounds present  EXTREMITIES:  2+ Peripheral Pulses, No clubbing, cyanosis, or edema  PSYCH: AAOx3  NEUROLOGY: non-focal  SKIN: No rashes or lesions Vital Signs Last 24 Hrs  T(C): 36.7 (01 Jul 2023 13:22), Max: 37.2 (01 Jul 2023 03:05)  T(F): 98.1 (01 Jul 2023 13:22), Max: 99 (01 Jul 2023 03:05)  HR: 70 (01 Jul 2023 13:22) (70 - 97)  BP: 123/71 (01 Jul 2023 13:22) (122/76 - 137/80)  BP(mean): --  RR: 20 (01 Jul 2023 13:22) (15 - 20)  SpO2: 100% (01 Jul 2023 13:22) (99% - 100%)    Parameters below as of 01 Jul 2023 13:22  Patient On (Oxygen Delivery Method): room air        PHYSICAL EXAM:  GENERAL: NAD, well-developed  HEAD:  Atraumatic, Normocephalic  EYES: EOMI, PERRLA, conjunctiva and sclera clear  NECK: Supple, No JVD  CHEST/LUNG: Clear to auscultation bilaterally; No wheeze  HEART: Regular rate and rhythm; No murmurs, rubs, or gallops  ABDOMEN: Soft, Nontender, Nondistended; Bowel sounds present+  EXT: RT LE swelling w/ stasis changes warm; multiple ulcers in different stages of healing; + RT posterior calf tender  PSYCH: AAOx3  NEUROLOGY: non-focal  SKIN: No rashes or lesions

## 2023-07-01 NOTE — ED ADULT TRIAGE NOTE - CHIEF COMPLAINT QUOTE
Patient has wound to right calf. States, the wound has been there for over a month and he was on antibiotics a month ago. Complaining of mild wound pain. Patient is a poor historian and unable to provide any information about how the wound occurred and treatment history.

## 2023-07-01 NOTE — H&P ADULT - NSHPSOCIALHISTORY_GEN_ALL_CORE
Lives with parents; smokes marijuana/ cocaine and crystal meth use yesterday; occasionally uses xanax; currently unemployed

## 2023-07-01 NOTE — H&P ADULT - PROBLEM SELECTOR PLAN 1
- s/p multiple doses of antibiotics   - c/w empiric dose of antibiotics; vanco/zosyn  - check CRP/ESR/procal/ d- dimer   - check MRSA swab   - f/u Bcx in lab  - US LE r/o DVT - s/p multiple doses of antibiotics; Xray w/o OM   - c/w empiric dose of antibiotics; vanco 1g IV q 12 /zosyn 3.375 q8  - check CRP/ESR/procal/ d- dimer   - check MRSA swab   - f/u Bcx in lab  - STARR/PVR   - wells score 2 for DVT (swelling entire leg; Post calf pain; at least moderate risk) US LE r/o DVT; empiric dosing of full dose lovenox;  until US done.   - + Hx of drug use multiple but states doesn't inject drugs; check Utox/serum tox   - wound care c/s   - derm c/s poss bx called will see in AM

## 2023-07-01 NOTE — ED PROVIDER NOTE - CLINICAL SUMMARY MEDICAL DECISION MAKING FREE TEXT BOX
27M h/o likely eczema p/w worsening RLE wound and swelling x months concerning for cellulitis, failed outpatient abx x2. Given degree of swelling, will obtain RLE duplex to r/o DVT. XRay ordered to assess for free air. VBG with lactate sent. Blood cultures to be obtained. Empiric broad antibiotic covereage with vanc and zosyn provided. Anticipate admission for ongoing antimicrobial administration and monitoring. Strong pulses and distal extremity warm to touch making arterial insufficiency unlikely.

## 2023-07-02 ENCOUNTER — TRANSCRIPTION ENCOUNTER (OUTPATIENT)
Age: 28
End: 2023-07-02

## 2023-07-02 DIAGNOSIS — Z29.9 ENCOUNTER FOR PROPHYLACTIC MEASURES, UNSPECIFIED: ICD-10-CM

## 2023-07-02 DIAGNOSIS — M79.604 PAIN IN RIGHT LEG: ICD-10-CM

## 2023-07-02 DIAGNOSIS — F19.90 OTHER PSYCHOACTIVE SUBSTANCE USE, UNSPECIFIED, UNCOMPLICATED: ICD-10-CM

## 2023-07-02 LAB
ALBUMIN SERPL ELPH-MCNC: 4 G/DL — SIGNIFICANT CHANGE UP (ref 3.3–5)
ALP SERPL-CCNC: 73 U/L — SIGNIFICANT CHANGE UP (ref 40–120)
ALT FLD-CCNC: 15 U/L — SIGNIFICANT CHANGE UP (ref 4–41)
AMPHET UR-MCNC: POSITIVE
ANION GAP SERPL CALC-SCNC: 12 MMOL/L — SIGNIFICANT CHANGE UP (ref 7–14)
AST SERPL-CCNC: 14 U/L — SIGNIFICANT CHANGE UP (ref 4–40)
BARBITURATES UR SCN-MCNC: NEGATIVE — SIGNIFICANT CHANGE UP
BASOPHILS # BLD AUTO: 0.05 K/UL — SIGNIFICANT CHANGE UP (ref 0–0.2)
BASOPHILS NFR BLD AUTO: 0.5 % — SIGNIFICANT CHANGE UP (ref 0–2)
BENZODIAZ UR-MCNC: NEGATIVE — SIGNIFICANT CHANGE UP
BILIRUB SERPL-MCNC: 0.2 MG/DL — SIGNIFICANT CHANGE UP (ref 0.2–1.2)
BUN SERPL-MCNC: 14 MG/DL — SIGNIFICANT CHANGE UP (ref 7–23)
CALCIUM SERPL-MCNC: 9 MG/DL — SIGNIFICANT CHANGE UP (ref 8.4–10.5)
CHLORIDE SERPL-SCNC: 105 MMOL/L — SIGNIFICANT CHANGE UP (ref 98–107)
CO2 SERPL-SCNC: 23 MMOL/L — SIGNIFICANT CHANGE UP (ref 22–31)
COCAINE METAB.OTHER UR-MCNC: POSITIVE
CREAT SERPL-MCNC: 0.89 MG/DL — SIGNIFICANT CHANGE UP (ref 0.5–1.3)
CREATININE URINE RESULT, DAU: 71 MG/DL — SIGNIFICANT CHANGE UP
EGFR: 121 ML/MIN/1.73M2 — SIGNIFICANT CHANGE UP
EOSINOPHIL # BLD AUTO: 0.75 K/UL — HIGH (ref 0–0.5)
EOSINOPHIL NFR BLD AUTO: 8 % — HIGH (ref 0–6)
GLUCOSE SERPL-MCNC: 95 MG/DL — SIGNIFICANT CHANGE UP (ref 70–99)
HCT VFR BLD CALC: 51.2 % — HIGH (ref 39–50)
HGB BLD-MCNC: 16.2 G/DL — SIGNIFICANT CHANGE UP (ref 13–17)
IANC: 3.58 K/UL — SIGNIFICANT CHANGE UP (ref 1.8–7.4)
IMM GRANULOCYTES NFR BLD AUTO: 0.3 % — SIGNIFICANT CHANGE UP (ref 0–0.9)
LYMPHOCYTES # BLD AUTO: 4.38 K/UL — HIGH (ref 1–3.3)
LYMPHOCYTES # BLD AUTO: 46.8 % — HIGH (ref 13–44)
MCHC RBC-ENTMCNC: 28.2 PG — SIGNIFICANT CHANGE UP (ref 27–34)
MCHC RBC-ENTMCNC: 31.6 GM/DL — LOW (ref 32–36)
MCV RBC AUTO: 89 FL — SIGNIFICANT CHANGE UP (ref 80–100)
METHADONE UR-MCNC: NEGATIVE — SIGNIFICANT CHANGE UP
MONOCYTES # BLD AUTO: 0.56 K/UL — SIGNIFICANT CHANGE UP (ref 0–0.9)
MONOCYTES NFR BLD AUTO: 6 % — SIGNIFICANT CHANGE UP (ref 2–14)
MRSA PCR RESULT.: SIGNIFICANT CHANGE UP
NEUTROPHILS # BLD AUTO: 3.58 K/UL — SIGNIFICANT CHANGE UP (ref 1.8–7.4)
NEUTROPHILS NFR BLD AUTO: 38.4 % — LOW (ref 43–77)
NRBC # BLD: 0 /100 WBCS — SIGNIFICANT CHANGE UP (ref 0–0)
NRBC # FLD: 0 K/UL — SIGNIFICANT CHANGE UP (ref 0–0)
OPIATES UR-MCNC: NEGATIVE — SIGNIFICANT CHANGE UP
OXYCODONE UR-MCNC: POSITIVE
PCP SPEC-MCNC: SIGNIFICANT CHANGE UP
PCP UR-MCNC: NEGATIVE — SIGNIFICANT CHANGE UP
PLATELET # BLD AUTO: 363 K/UL — SIGNIFICANT CHANGE UP (ref 150–400)
POTASSIUM SERPL-MCNC: 4 MMOL/L — SIGNIFICANT CHANGE UP (ref 3.5–5.3)
POTASSIUM SERPL-SCNC: 4 MMOL/L — SIGNIFICANT CHANGE UP (ref 3.5–5.3)
PROT SERPL-MCNC: 6.9 G/DL — SIGNIFICANT CHANGE UP (ref 6–8.3)
RBC # BLD: 5.75 M/UL — SIGNIFICANT CHANGE UP (ref 4.2–5.8)
RBC # FLD: 13.3 % — SIGNIFICANT CHANGE UP (ref 10.3–14.5)
S AUREUS DNA NOSE QL NAA+PROBE: DETECTED
SODIUM SERPL-SCNC: 140 MMOL/L — SIGNIFICANT CHANGE UP (ref 135–145)
THC UR QL: POSITIVE
WBC # BLD: 9.35 K/UL — SIGNIFICANT CHANGE UP (ref 3.8–10.5)
WBC # FLD AUTO: 9.35 K/UL — SIGNIFICANT CHANGE UP (ref 3.8–10.5)

## 2023-07-02 PROCEDURE — 99232 SBSQ HOSP IP/OBS MODERATE 35: CPT

## 2023-07-02 PROCEDURE — 99221 1ST HOSP IP/OBS SF/LOW 40: CPT

## 2023-07-02 RX ORDER — POLYETHYLENE GLYCOL 3350 17 G/17G
17 POWDER, FOR SOLUTION ORAL DAILY
Refills: 0 | Status: DISCONTINUED | OUTPATIENT
Start: 2023-07-02 | End: 2023-07-05

## 2023-07-02 RX ORDER — LACTOBACILLUS ACIDOPHILUS 100MM CELL
1 CAPSULE ORAL DAILY
Refills: 0 | Status: DISCONTINUED | OUTPATIENT
Start: 2023-07-02 | End: 2023-07-05

## 2023-07-02 RX ORDER — PETROLATUM,WHITE
1 JELLY (GRAM) TOPICAL
Refills: 0 | Status: DISCONTINUED | OUTPATIENT
Start: 2023-07-02 | End: 2023-07-05

## 2023-07-02 RX ORDER — SENNA PLUS 8.6 MG/1
2 TABLET ORAL AT BEDTIME
Refills: 0 | Status: DISCONTINUED | OUTPATIENT
Start: 2023-07-02 | End: 2023-07-05

## 2023-07-02 RX ORDER — TRAMADOL HYDROCHLORIDE 50 MG/1
25 TABLET ORAL ONCE
Refills: 0 | Status: DISCONTINUED | OUTPATIENT
Start: 2023-07-02 | End: 2023-07-02

## 2023-07-02 RX ADMIN — Medication 1 APPLICATION(S): at 21:35

## 2023-07-02 RX ADMIN — Medication 250 MILLIGRAM(S): at 18:12

## 2023-07-02 RX ADMIN — Medication 1 TABLET(S): at 13:29

## 2023-07-02 RX ADMIN — PIPERACILLIN AND TAZOBACTAM 25 GRAM(S): 4; .5 INJECTION, POWDER, LYOPHILIZED, FOR SOLUTION INTRAVENOUS at 06:20

## 2023-07-02 RX ADMIN — TRAMADOL HYDROCHLORIDE 25 MILLIGRAM(S): 50 TABLET ORAL at 03:05

## 2023-07-02 RX ADMIN — TRAMADOL HYDROCHLORIDE 25 MILLIGRAM(S): 50 TABLET ORAL at 02:34

## 2023-07-02 RX ADMIN — Medication 250 MILLIGRAM(S): at 06:21

## 2023-07-02 RX ADMIN — ENOXAPARIN SODIUM 95 MILLIGRAM(S): 100 INJECTION SUBCUTANEOUS at 07:01

## 2023-07-02 RX ADMIN — PIPERACILLIN AND TAZOBACTAM 25 GRAM(S): 4; .5 INJECTION, POWDER, LYOPHILIZED, FOR SOLUTION INTRAVENOUS at 13:27

## 2023-07-02 RX ADMIN — ENOXAPARIN SODIUM 95 MILLIGRAM(S): 100 INJECTION SUBCUTANEOUS at 18:09

## 2023-07-02 RX ADMIN — PIPERACILLIN AND TAZOBACTAM 25 GRAM(S): 4; .5 INJECTION, POWDER, LYOPHILIZED, FOR SOLUTION INTRAVENOUS at 21:33

## 2023-07-02 NOTE — PROGRESS NOTE ADULT - SUBJECTIVE AND OBJECTIVE BOX
[facility]  Pager [phone]    Patient is a 27y old  Male who presents with a chief complaint of RT LE wound (01 Jul 2023 17:29)      INTERVAL HPI/OVERNIGHT EVENTS:    MEDICATIONS  (STANDING):  enoxaparin Injectable 95 milliGRAM(s) SubCutaneous every 12 hours  lactobacillus acidophilus 1 Tablet(s) Oral daily  piperacillin/tazobactam IVPB.. 3.375 Gram(s) IV Intermittent every 8 hours  vancomycin  IVPB 1250 milliGRAM(s) IV Intermittent every 12 hours    MEDICATIONS  (PRN):  acetaminophen     Tablet .. 650 milliGRAM(s) Oral every 6 hours PRN Temp greater or equal to 38C (100.4F), Mild Pain (1 - 3)  aluminum hydroxide/magnesium hydroxide/simethicone Suspension 30 milliLiter(s) Oral every 4 hours PRN Dyspepsia  melatonin 3 milliGRAM(s) Oral at bedtime PRN Insomnia  ondansetron Injectable 4 milliGRAM(s) IV Push every 8 hours PRN Nausea and/or Vomiting  oxycodone    5 mG/acetaminophen 325 mG 1 Tablet(s) Oral every 6 hours PRN Moderate - Severe Pain (4 - 10)  polyethylene glycol 3350 17 Gram(s) Oral daily PRN Constipation  senna 2 Tablet(s) Oral at bedtime PRN Constipation      Allergies    No Known Allergies    Intolerances        REVIEW OF SYSTEMS:  Please see interval HPI:    Vital Signs Last 24 Hrs  T(C): 37 (02 Jul 2023 11:35), Max: 37 (02 Jul 2023 11:35)  T(F): 98.6 (02 Jul 2023 11:35), Max: 98.6 (02 Jul 2023 11:35)  HR: 92 (02 Jul 2023 11:35) (61 - 92)  BP: 134/76 (02 Jul 2023 11:35) (116/61 - 136/82)  BP(mean): 72 (02 Jul 2023 05:45) (72 - 72)  RR: 18 (02 Jul 2023 11:35) (18 - 20)  SpO2: 100% (02 Jul 2023 11:35) (99% - 100%)    Parameters below as of 02 Jul 2023 11:35  Patient On (Oxygen Delivery Method): room air      I&O's Detail        PHYSICAL EXAM:  GENERAL:   HEAD:    EYES:   ENMT:   NECK:   NERVOUS SYSTEM:    CHEST/LUNG:   HEART:   ABDOMEN:   EXTREMITIES:    LYMPH:   SKIN:     LABS:                        16.2   9.35  )-----------( 363      ( 02 Jul 2023 06:05 )             51.2     02 Jul 2023 06:05    140    |  105    |  14     ----------------------------<  95     4.0     |  23     |  0.89     Ca    9.0        02 Jul 2023 06:05    TPro  6.9    /  Alb  4.0    /  TBili  0.2    /  DBili  x      /  AST  14     /  ALT  15     /  AlkPhos  73     02 Jul 2023 06:05      CAPILLARY BLOOD GLUCOSE        BLOOD CULTURE  07-01 @ 04:25   No growth at 24 hours  --  --  07-01 @ 04:20   No growth at 24 hours  --  --    RADIOLOGY & ADDITIONAL TESTS:    Imaging Personally Reviewed:  [ ] YES     Consultant(s) Notes Reviewed:      Care Discussed with Consultants/Other Providers: [facility]  Pager [phone]    Patient is a 27y old  Male who presents with a chief complaint of RT LE wound (01 Jul 2023 17:29)      INTERVAL HPI/OVERNIGHT EVENTS:  Denies fever/chills. Reports chronic RLE wound with history of blisters, pruritic. Denies family or personal history of autoimmune diseases or dermatologic conditions. Although history of substance use, denies any injections. No known bites, no pets licking wounds, no pricking by thorns. Discussed plan for workup, patient in agreement.     MEDICATIONS  (STANDING):  enoxaparin Injectable 95 milliGRAM(s) SubCutaneous every 12 hours  lactobacillus acidophilus 1 Tablet(s) Oral daily  piperacillin/tazobactam IVPB.. 3.375 Gram(s) IV Intermittent every 8 hours  vancomycin  IVPB 1250 milliGRAM(s) IV Intermittent every 12 hours    MEDICATIONS  (PRN):  acetaminophen     Tablet .. 650 milliGRAM(s) Oral every 6 hours PRN Temp greater or equal to 38C (100.4F), Mild Pain (1 - 3)  aluminum hydroxide/magnesium hydroxide/simethicone Suspension 30 milliLiter(s) Oral every 4 hours PRN Dyspepsia  melatonin 3 milliGRAM(s) Oral at bedtime PRN Insomnia  ondansetron Injectable 4 milliGRAM(s) IV Push every 8 hours PRN Nausea and/or Vomiting  oxycodone    5 mG/acetaminophen 325 mG 1 Tablet(s) Oral every 6 hours PRN Moderate - Severe Pain (4 - 10)  polyethylene glycol 3350 17 Gram(s) Oral daily PRN Constipation  senna 2 Tablet(s) Oral at bedtime PRN Constipation    Allergies  No Known Allergies    Intolerances    REVIEW OF SYSTEMS:  Please see interval HPI:    Vital Signs Last 24 Hrs  T(C): 37 (02 Jul 2023 11:35), Max: 37 (02 Jul 2023 11:35)  T(F): 98.6 (02 Jul 2023 11:35), Max: 98.6 (02 Jul 2023 11:35)  HR: 92 (02 Jul 2023 11:35) (61 - 92)  BP: 134/76 (02 Jul 2023 11:35) (116/61 - 136/82)  BP(mean): 72 (02 Jul 2023 05:45) (72 - 72)  RR: 18 (02 Jul 2023 11:35) (18 - 20)  SpO2: 100% (02 Jul 2023 11:35) (99% - 100%)    Parameters below as of 02 Jul 2023 11:35  Patient On (Oxygen Delivery Method): room air    I&O's Detail    PHYSICAL EXAM:  GENERAL: NAD, lying in bed  HEAD:  NC/At  EYES: EOMI, clear sclera/conjunctiva  ENMT: MMM, hearing intact to voice  NECK: supple  NERVOUS SYSTEM:  moving all extremities, SILT grossly,   CHEST/LUNG:   HEART:   ABDOMEN:   EXTREMITIES:    LYMPH:   SKIN:     LABS:                        16.2   9.35  )-----------( 363      ( 02 Jul 2023 06:05 )             51.2     02 Jul 2023 06:05    140    |  105    |  14     ----------------------------<  95     4.0     |  23     |  0.89     Ca    9.0        02 Jul 2023 06:05    TPro  6.9    /  Alb  4.0    /  TBili  0.2    /  DBili  x      /  AST  14     /  ALT  15     /  AlkPhos  73     02 Jul 2023 06:05      CAPILLARY BLOOD GLUCOSE        BLOOD CULTURE  07-01 @ 04:25   No growth at 24 hours  --  --  07-01 @ 04:20   No growth at 24 hours  --  --    RADIOLOGY & ADDITIONAL TESTS:    Imaging Personally Reviewed:  [ ] YES     Consultant(s) Notes Reviewed:      Care Discussed with Consultants/Other Providers: [facility]  Pager [phone]    Patient is a 27y old  Male who presents with a chief complaint of RT LE wound (01 Jul 2023 17:29)      INTERVAL HPI/OVERNIGHT EVENTS:  Denies fever/chills. Reports chronic RLE wound with history of blisters, pruritic. Denies family or personal history of autoimmune diseases or dermatologic conditions. Although history of substance use, denies any injections. No known bites, no pets licking wounds, no pricking by thorns. Discussed plan for workup, patient in agreement.     MEDICATIONS  (STANDING):  enoxaparin Injectable 95 milliGRAM(s) SubCutaneous every 12 hours  lactobacillus acidophilus 1 Tablet(s) Oral daily  piperacillin/tazobactam IVPB.. 3.375 Gram(s) IV Intermittent every 8 hours  vancomycin  IVPB 1250 milliGRAM(s) IV Intermittent every 12 hours    MEDICATIONS  (PRN):  acetaminophen     Tablet .. 650 milliGRAM(s) Oral every 6 hours PRN Temp greater or equal to 38C (100.4F), Mild Pain (1 - 3)  aluminum hydroxide/magnesium hydroxide/simethicone Suspension 30 milliLiter(s) Oral every 4 hours PRN Dyspepsia  melatonin 3 milliGRAM(s) Oral at bedtime PRN Insomnia  ondansetron Injectable 4 milliGRAM(s) IV Push every 8 hours PRN Nausea and/or Vomiting  oxycodone    5 mG/acetaminophen 325 mG 1 Tablet(s) Oral every 6 hours PRN Moderate - Severe Pain (4 - 10)  polyethylene glycol 3350 17 Gram(s) Oral daily PRN Constipation  senna 2 Tablet(s) Oral at bedtime PRN Constipation    Allergies  No Known Allergies    Intolerances    REVIEW OF SYSTEMS:  Please see interval HPI:    Vital Signs Last 24 Hrs  T(C): 37 (02 Jul 2023 11:35), Max: 37 (02 Jul 2023 11:35)  T(F): 98.6 (02 Jul 2023 11:35), Max: 98.6 (02 Jul 2023 11:35)  HR: 92 (02 Jul 2023 11:35) (61 - 92)  BP: 134/76 (02 Jul 2023 11:35) (116/61 - 136/82)  BP(mean): 72 (02 Jul 2023 05:45) (72 - 72)  RR: 18 (02 Jul 2023 11:35) (18 - 20)  SpO2: 100% (02 Jul 2023 11:35) (99% - 100%)    Parameters below as of 02 Jul 2023 11:35  Patient On (Oxygen Delivery Method): room air    I&O's Detail    PHYSICAL EXAM:  GENERAL: NAD, lying in bed  HEAD:  NC/At  EYES: EOMI, clear sclera/conjunctiva  ENMT: MMM, hearing intact to voice  NECK: supple  NERVOUS SYSTEM:  moving all extremities, SILT grossly,   CHEST/LUNG: Comfortable on RA, no respiratory distress  ABDOMEN: soft, non-tender  EXTREMITIES:  LLE with area of faint erythema, RLE with swelling and apparent venous stasis changes (dark discoloration) thickened skin, areas of ulcers, +pulses      LABS:                        16.2   9.35  )-----------( 363      ( 02 Jul 2023 06:05 )             51.2     02 Jul 2023 06:05    140    |  105    |  14     ----------------------------<  95     4.0     |  23     |  0.89     Ca    9.0        02 Jul 2023 06:05    TPro  6.9    /  Alb  4.0    /  TBili  0.2    /  DBili  x      /  AST  14     /  ALT  15     /  AlkPhos  73     02 Jul 2023 06:05      CAPILLARY BLOOD GLUCOSE    BLOOD CULTURE  07-01 @ 04:25   No growth at 24 hours  --  --  07-01 @ 04:20   No growth at 24 hours  --  --    RADIOLOGY & ADDITIONAL TESTS:    Imaging Personally Reviewed:  [ x] YES   < from: Xray Tibia + Fibula 2 Views, Right (07.01.23 @ 05:19) >  FINDINGS:  No acute fracture. No cortical erosions.    Joint spaces are maintained. No dislocation. No joint effusion.    Mild edema in the subcutaneous fat anterior to the proximal aspect of the   tibial diaphysis. No subcutaneous gas.    IMPRESSION:  No radiographic evidence of osteomyelitis.    < end of copied text >      Consultant(s) Notes Reviewed:      Care Discussed with Consultants/Other Providers: HOLLY Gerson re: pending duplex to r/o DVT, awaiting derm eval (they requested picture of extremity), wound care eval

## 2023-07-02 NOTE — PROGRESS NOTE ADULT - ASSESSMENT
26 yo M w/ substance use (marijuana/cocaine/crystal meth), presenting with complaints of chronic RLE wound with worsening pain and swelling.

## 2023-07-02 NOTE — PATIENT PROFILE ADULT - FUNCTIONAL ASSESSMENT - BASIC MOBILITY 6.
4-calculated by average/Not able to assess (calculate score using [facility] averaging method)

## 2023-07-02 NOTE — CONSULT NOTE ADULT - ATTENDING COMMENTS
I evaluated this patient at bedside on 7/2/23. There is no clinical evidence of pyoderma gangrenosum on the RLE. Exam shows superficial erosions with surrounding chronic dermatitic changes. These skin changes can be 2/2 recurrent trauma, infection, or underlying vascular changes. Would recommend liberal petrolatum to area, and once clear from an infectious perspective, consider trial of topical steroid to AA. All other management and workup by primary team.

## 2023-07-02 NOTE — CONSULT NOTE ADULT - ASSESSMENT
R LE Erosions and rash  - favor chronic erosion, unclear cause of injury.  - clinically there are no full thickness ulcers on exam,  no undermining violaceous borders, no cri biform scarring that may suggest pyoderma gangrenosum.   - recommend continuing antibiotics and pain management as per primary team  - recommend frequent liberal vaseline application to the area throughout the day  - if/when ID deems safe from an infection perspective, may consider starting triamcinolone ointment BID for 2 weeks     Acute contact dermatitis, lower abdomen  - characteristic distribution for ana laura or other metal allergy associated with his belt buckle and/or metal button. Recommend avoiding contact with those metals and applying triamcinolone 0.1% ointment to the affected area twice a day for 2 weeks.     Patient was seen at bedside with the dermatology attending Dr. Sears.  Recommendations were communicated with the primary team.    Dermatology team to sign off. Please reconsult with any new concerns.     For routine outpatient follow up after discharge:  Please call to schedule appointment with Dr. Sears or other faculty at the address below.   [facility] D[facility]atology at [place]  [address] [place] [address]  [phone]    Please page [phone] for further related questions.    Dante Limon MD  Resident Physician, PGY2  [facility]  Pager: [phone]   R LE Erosions and rash  - favor chronic erosion, unclear cause of injury.  - clinically there are no full thickness ulcers on exam,  no undermining violaceous borders, or no cribriform scarring that may suggest pyoderma gangrenosum.   - recommend continuing antibiotics and pain management as per primary team  - recommend frequent liberal vaseline application to the area throughout the day  - if/when ID deems safe from an infection perspective, may consider starting triamcinolone 0.1% ointment BID for 2 weeks     Acute contact dermatitis, lower abdomen  - characteristic distribution for ana laura or other metal allergy associated with his belt buckle and/or metal button. Recommend avoiding contact with those metals and applying triamcinolone 0.1% ointment to the affected area twice a day for 2 weeks, then stop.     Patient was seen at bedside with the dermatology attending Dr. Sears.  Recommendations were communicated with the primary team.    Dermatology team to sign off. Please reconsult with any new concerns.     For routine outpatient follow up after discharge:  Please call to schedule appointment with Dr. Sears or other faculty at the address below.   [facility] Dermatology at [place]  [address] [place] [address]  [phone]    Please page [phone] for further related questions.    Dante Limon MD  Resident Physician, PGY2  [facility]  Pager: [phone]

## 2023-07-02 NOTE — DISCHARGE NOTE PROVIDER - HOSPITAL COURSE
26 yo M w/ substance use (marijuana/cocaine/crystal meth), presenting with complaints of chronic RLE wound with worsening pain and swelling. Patient was started on empiric antibiotics pending an infectious workup and empiric anticoagulation pending duplex results to r/o DVT. Dopplers showed .... Patient was evaluated by wound care who recommended.... Dermatology suggested ...   26 yo M w/ substance use (marijuana/cocaine/crystal meth), presenting with complaints of chronic RLE wound with worsening pain and swelling. Patient was started on empiric antibiotics and were subsequently discontinued. Dopplers showed no evidence of clot and therefore therapeutic lovenox was discontinued. Patient was evaluated by dermatology and recommended Vaseline and Triamcinolone 0.1% BID x2 weeks. Patient refused SBIRT consult this admission. Patient to follow up outpatient with PCP, dermatology and for ultrasound of R medial malleolus (per dermatology recommendations) and STARR/PVR.  Patient is medically optimized for discharge home. Case/labs/medications discussed with Dr. Pickering on 7/5/23. 28 yo M w/ substance use (marijuana/cocaine/crystal meth), presenting with complaints of chronic RLE wound with worsening pain and swelling. Patient was started on empiric antibiotics and were subsequently discontinued. Dopplers showed no evidence of clot and therefore therapeutic lovenox was discontinued. Patient was evaluated by dermatology who recommended Vaseline and Triamcinolone 0.1% BID x2 weeks. Patient refused SBIRT consult this admission. Patient initially amenable to behavioral health consult this admission however subsequently refused. Patient to follow up outpatient with PCP, dermatology and for ultrasound of R medial malleolus (per dermatology recommendations) and STARR/PVR.  Patient is medically optimized for discharge home. Case/labs/medications discussed with Dr. Pickering on 7/5/23. 26 yo M w/ substance use (marijuana/cocaine/crystal meth), presenting with complaints of chronic RLE wound with worsening pain and swelling.       Pain and swelling of right lower extremity.   - s/p vanc/ zosyn   - Xray negative for OM, Bcx NGTD, is afebrile/non-toxic appearing, extent of discoloration was previously demarcarted by marker  - patient denies any hx of IV drug injections   - ESR/CRP low   - LE duplex neg DVT, therapeutic lovenox DC'd   - derm: reccs topical vaseline and triamcinolone 1% BID x2 weeks. US of R medial malleolus, defer outpatient.   - STARR/PVR deferred outpatient.     Substance use.   - utox +cocaine, amphetamine, THC, oxy  - reported use of multiple substances but denies IVDU (afraid of needles)  -  cessation, SBIRT assistance appreciated. (patient refused SBIRT assistance)   -  consulted for concern of oxy withdrawal/ anxiety (initially amenable however then refused BH consult)     Patient is medically optimized for discharge home. To follow up with PCP, dermatology, and outpatient imaging. Case/labs/medications discussed with Dr. Pickering on 7/5/23.

## 2023-07-02 NOTE — DISCHARGE NOTE PROVIDER - CARE PROVIDER_API CALL
MONSERRAT MALDONADO  Phone: [phone]  Fax: [phone]  Follow Up Time:     Susan Sears  Dermatology  [address] [place] [address], [facility] [address]  [place] [address]  Phone: [phone]  Fax: [phone]  Follow Up Time:

## 2023-07-02 NOTE — DISCHARGE NOTE PROVIDER - NSDCFUADDAPPT_GEN_ALL_CORE_FT
[facility]  [address], [address] [place] [address] [phone] [facility]  [address], [address] [facility], 1st Floor  [place] [id number] [phone]    You may follow up at a vascular clinic for STARR/PVR.    Please call to schedule appointment with Dr. Sears or other faculty at the address below.   [facility]ermat[facility]  [address] [place] [id number] [phone] [facility]  [address], [address], [address] [place] [address]  [phone]    You may follow up at a vascular clinic for STARR/PVR. Vascular: [address] [place] [address]    Please call to schedule appointment with Dr. Sears or other faculty at the address below.   [facility]at[facility] [place]  [address] [place] [address], [place] [address]  [phone]

## 2023-07-02 NOTE — DISCHARGE NOTE PROVIDER - NSDCCPCAREPLAN_GEN_ALL_CORE_FT
PRINCIPAL DISCHARGE DIAGNOSIS  Diagnosis: Pain and swelling of right lower extremity  Assessment and Plan of Treatment: You had pain and swelling in your right lower leg. An ultrasound of your leg was done to look for a blood clot and it showed.... You were seen by the wound care specialist who recommended... Please followup with your outpatient providers and the wound care team.      SECONDARY DISCHARGE DIAGNOSES  Diagnosis: Substance use  Assessment and Plan of Treatment: Please stop using ilicit substances like cocaine, crystal meth as they are bad for your health. Please followup with your outpatient provider for assistance in quitting. You can also go the the [facility] at [facility] for further assistance.     PRINCIPAL DISCHARGE DIAGNOSIS  Diagnosis: Pain and swelling of right lower extremity  Assessment and Plan of Treatment: You had pain and swelling in your right lower leg. An ultrasound of your leg was done to look for a blood clot and it showed no clot.  You were seen by the dermatologists who stated that the lesions were likely chronic in nature with a component of contact dermatitis. Please continue to apply vaseline and the topical steroid as directed. Follow up with your PCP and dermatology (information left on the discharge, please call to make an appointment) within 1-2 weeks for further monitoring and management.   Please obtain an ultrasound of the R medial mallelous (per dermatology recommendations) outpatient for further monitoring. You may obtain STARR/PVR outpatient for assessment of your vascular function.      SECONDARY DISCHARGE DIAGNOSES  Diagnosis: Substance use  Assessment and Plan of Treatment: Please stop using ilicit substances like cocaine, crystal meth as they are bad for your health. Please followup with your outpatient provider for assistance in quitting. You can also go the the [facility] at [facility] for further assistance.

## 2023-07-02 NOTE — DISCHARGE NOTE PROVIDER - NSDCMRMEDTOKEN_GEN_ALL_CORE_FT
acetaminophen 500 mg oral tablet: 1-2  tab(s) orally every 6 hours, As Needed for mild pain.  Cleocin HCl 300 mg oral capsule: 1 cap(s) orally 4 times a day   naproxen 500 mg oral tablet: 1 tab(s) orally 2 times a day, As Needed -for moderate pain   acetaminophen 500 mg oral tablet: 1-2  tab(s) orally every 6 hours, As Needed for mild pain.  melatonin 3 mg oral tablet: 1 tab(s) orally once a day (at bedtime) As needed Insomnia  naproxen 500 mg oral tablet: 1 tab(s) orally 2 times a day, As Needed -for moderate pain  nicotine 21 mg/24 hr transdermal film, extended release: 1 patch transdermal once a day  petrolatum topical ointment: 1 Apply topically to affected area 2 times a day  triamcinolone 0.1% topical ointment: Apply topically to affected area 2 times a day

## 2023-07-02 NOTE — PROGRESS NOTE ADULT - PROBLEM SELECTOR PLAN 1
- on empiric antibiotics (vanc/zosyn) pending infectious workup (monitor vanc level to avoid nephrotoxicity), Xray negative for OM, Bcx NGTD, is afebrile/non-toxic appearing, extent of discoloration was previously demarcarted by marker  - patient denies any hx of IV drug injections   - await LE duplex to r/o DVT, on empiric anticoagulation (lovenox) pending results  - wound care consult  - await derm eval  - has distal pulses  - c/w symptomatic management of pain, bowel regimen to prevent opioid induced constipation

## 2023-07-02 NOTE — DISCHARGE NOTE PROVIDER - ATTENDING DISCHARGE PHYSICAL EXAMINATION:
PHYSICAL EXAM:  GENERAL: NAD, sleeping but arousable to voice  HEAD:  NC/AT  EYES: EOMI, clear sclera/conjunctiva  ENMT: MMM, hearing intact to voice  NECK: supple  NERVOUS SYSTEM: moving all extremities, non-focal    CHEST/LUNG: Comfortable on RA, speaking in full sentences  EXTREMITIES:  RLE w/ hyperpigmentation, less scaling today, xerosis improving, some superficial erosions

## 2023-07-03 LAB
A1C WITH ESTIMATED AVERAGE GLUCOSE RESULT: 5.7 % — HIGH (ref 4–5.6)
ALBUMIN SERPL ELPH-MCNC: 4 G/DL — SIGNIFICANT CHANGE UP (ref 3.3–5)
ALP SERPL-CCNC: 72 U/L — SIGNIFICANT CHANGE UP (ref 40–120)
ALT FLD-CCNC: 14 U/L — SIGNIFICANT CHANGE UP (ref 4–41)
ANION GAP SERPL CALC-SCNC: 14 MMOL/L — SIGNIFICANT CHANGE UP (ref 7–14)
AST SERPL-CCNC: 13 U/L — SIGNIFICANT CHANGE UP (ref 4–40)
BILIRUB SERPL-MCNC: 0.2 MG/DL — SIGNIFICANT CHANGE UP (ref 0.2–1.2)
BUN SERPL-MCNC: 12 MG/DL — SIGNIFICANT CHANGE UP (ref 7–23)
CALCIUM SERPL-MCNC: 9.1 MG/DL — SIGNIFICANT CHANGE UP (ref 8.4–10.5)
CHLORIDE SERPL-SCNC: 102 MMOL/L — SIGNIFICANT CHANGE UP (ref 98–107)
CO2 SERPL-SCNC: 25 MMOL/L — SIGNIFICANT CHANGE UP (ref 22–31)
CREAT SERPL-MCNC: 1.12 MG/DL — SIGNIFICANT CHANGE UP (ref 0.5–1.3)
EGFR: 93 ML/MIN/1.73M2 — SIGNIFICANT CHANGE UP
ESTIMATED AVERAGE GLUCOSE: 117 — SIGNIFICANT CHANGE UP
GLUCOSE SERPL-MCNC: 102 MG/DL — HIGH (ref 70–99)
HCT VFR BLD CALC: 48.8 % — SIGNIFICANT CHANGE UP (ref 39–50)
HGB BLD-MCNC: 15.7 G/DL — SIGNIFICANT CHANGE UP (ref 13–17)
MAGNESIUM SERPL-MCNC: 2.2 MG/DL — SIGNIFICANT CHANGE UP (ref 1.6–2.6)
MCHC RBC-ENTMCNC: 27.8 PG — SIGNIFICANT CHANGE UP (ref 27–34)
MCHC RBC-ENTMCNC: 32.2 GM/DL — SIGNIFICANT CHANGE UP (ref 32–36)
MCV RBC AUTO: 86.4 FL — SIGNIFICANT CHANGE UP (ref 80–100)
NRBC # BLD: 0 /100 WBCS — SIGNIFICANT CHANGE UP (ref 0–0)
NRBC # FLD: 0 K/UL — SIGNIFICANT CHANGE UP (ref 0–0)
PHOSPHATE SERPL-MCNC: 4.1 MG/DL — SIGNIFICANT CHANGE UP (ref 2.5–4.5)
PLATELET # BLD AUTO: 366 K/UL — SIGNIFICANT CHANGE UP (ref 150–400)
POTASSIUM SERPL-MCNC: 4.3 MMOL/L — SIGNIFICANT CHANGE UP (ref 3.5–5.3)
POTASSIUM SERPL-SCNC: 4.3 MMOL/L — SIGNIFICANT CHANGE UP (ref 3.5–5.3)
PROT SERPL-MCNC: 6.9 G/DL — SIGNIFICANT CHANGE UP (ref 6–8.3)
RBC # BLD: 5.65 M/UL — SIGNIFICANT CHANGE UP (ref 4.2–5.8)
RBC # FLD: 13 % — SIGNIFICANT CHANGE UP (ref 10.3–14.5)
SODIUM SERPL-SCNC: 141 MMOL/L — SIGNIFICANT CHANGE UP (ref 135–145)
VANCOMYCIN TROUGH SERPL-MCNC: 5.5 UG/ML — LOW (ref 10–20)
WBC # BLD: 9.42 K/UL — SIGNIFICANT CHANGE UP (ref 3.8–10.5)
WBC # FLD AUTO: 9.42 K/UL — SIGNIFICANT CHANGE UP (ref 3.8–10.5)

## 2023-07-03 PROCEDURE — 93970 EXTREMITY STUDY: CPT | Mod: 26

## 2023-07-03 PROCEDURE — 99233 SBSQ HOSP IP/OBS HIGH 50: CPT

## 2023-07-03 PROCEDURE — 99233 SBSQ HOSP IP/OBS HIGH 50: CPT | Mod: GC

## 2023-07-03 RX ORDER — VANCOMYCIN HCL 1 G
1500 VIAL (EA) INTRAVENOUS EVERY 12 HOURS
Refills: 0 | Status: DISCONTINUED | OUTPATIENT
Start: 2023-07-03 | End: 2023-07-04

## 2023-07-03 RX ORDER — ALPRAZOLAM 0.25 MG
0.5 TABLET ORAL ONCE
Refills: 0 | Status: DISCONTINUED | OUTPATIENT
Start: 2023-07-03 | End: 2023-07-03

## 2023-07-03 RX ORDER — NICOTINE POLACRILEX 2 MG
1 GUM BUCCAL DAILY
Refills: 0 | Status: DISCONTINUED | OUTPATIENT
Start: 2023-07-03 | End: 2023-07-05

## 2023-07-03 RX ORDER — NICOTINE POLACRILEX 2 MG
1 GUM BUCCAL DAILY
Refills: 0 | Status: DISCONTINUED | OUTPATIENT
Start: 2023-07-03 | End: 2023-07-03

## 2023-07-03 RX ORDER — MUPIROCIN 20 MG/G
1 OINTMENT TOPICAL
Refills: 0 | Status: DISCONTINUED | OUTPATIENT
Start: 2023-07-03 | End: 2023-07-03

## 2023-07-03 RX ORDER — MUPIROCIN 20 MG/G
1 OINTMENT TOPICAL
Refills: 0 | Status: DISCONTINUED | OUTPATIENT
Start: 2023-07-03 | End: 2023-07-05

## 2023-07-03 RX ORDER — ALPRAZOLAM 0.25 MG
0.5 TABLET ORAL EVERY 24 HOURS
Refills: 0 | Status: DISCONTINUED | OUTPATIENT
Start: 2023-07-03 | End: 2023-07-05

## 2023-07-03 RX ORDER — ENOXAPARIN SODIUM 100 MG/ML
40 INJECTION SUBCUTANEOUS EVERY 24 HOURS
Refills: 0 | Status: DISCONTINUED | OUTPATIENT
Start: 2023-07-03 | End: 2023-07-05

## 2023-07-03 RX ORDER — SODIUM CHLORIDE 9 MG/ML
1000 INJECTION, SOLUTION INTRAVENOUS
Refills: 0 | Status: DISCONTINUED | OUTPATIENT
Start: 2023-07-03 | End: 2023-07-05

## 2023-07-03 RX ADMIN — Medication 1 APPLICATION(S): at 05:23

## 2023-07-03 RX ADMIN — Medication 0.5 MILLIGRAM(S): at 20:59

## 2023-07-03 RX ADMIN — SODIUM CHLORIDE 100 MILLILITER(S): 9 INJECTION, SOLUTION INTRAVENOUS at 17:25

## 2023-07-03 RX ADMIN — PIPERACILLIN AND TAZOBACTAM 25 GRAM(S): 4; .5 INJECTION, POWDER, LYOPHILIZED, FOR SOLUTION INTRAVENOUS at 13:17

## 2023-07-03 RX ADMIN — MUPIROCIN 1 APPLICATION(S): 20 OINTMENT TOPICAL at 17:26

## 2023-07-03 RX ADMIN — Medication 300 MILLIGRAM(S): at 17:25

## 2023-07-03 RX ADMIN — ENOXAPARIN SODIUM 95 MILLIGRAM(S): 100 INJECTION SUBCUTANEOUS at 05:20

## 2023-07-03 RX ADMIN — Medication 1 PATCH: at 15:52

## 2023-07-03 RX ADMIN — PIPERACILLIN AND TAZOBACTAM 25 GRAM(S): 4; .5 INJECTION, POWDER, LYOPHILIZED, FOR SOLUTION INTRAVENOUS at 21:41

## 2023-07-03 RX ADMIN — SODIUM CHLORIDE 100 MILLILITER(S): 9 INJECTION, SOLUTION INTRAVENOUS at 21:41

## 2023-07-03 RX ADMIN — Medication 3 MILLIGRAM(S): at 21:41

## 2023-07-03 RX ADMIN — PIPERACILLIN AND TAZOBACTAM 25 GRAM(S): 4; .5 INJECTION, POWDER, LYOPHILIZED, FOR SOLUTION INTRAVENOUS at 06:52

## 2023-07-03 RX ADMIN — Medication 0.5 MILLIGRAM(S): at 12:22

## 2023-07-03 RX ADMIN — Medication 0.5 MILLIGRAM(S): at 13:17

## 2023-07-03 RX ADMIN — Medication 250 MILLIGRAM(S): at 05:21

## 2023-07-03 RX ADMIN — Medication 1 PATCH: at 20:55

## 2023-07-03 RX ADMIN — Medication 1 APPLICATION(S): at 17:26

## 2023-07-03 NOTE — PROGRESS NOTE ADULT - ASSESSMENT
R LE Erosions and rash  - favor chronic erosion, unclear cause of injury.  - clinically there are no full thickness ulcers on exam,  no undermining violaceous borders, or no cribriform scarring that may suggest pyoderma gangrenosum.   - recommend continuing antibiotics and pain management as per primary team  - recommend frequent liberal vaseline application to the area throughout the day  - if/when ID deems safe from an infection perspective, may consider starting triamcinolone 0.1% ointment BID for 2 weeks   - recommend Ultrasound of nodule superior to R medial malleolus to r/o fluid collection      Patient was seen at bedside with the dermatology attending Dr. Escobar.  Dermatology team to sign off. Please reconsult with any new concerns.     For routine outpatient follow up after discharge:  Please call to schedule appointment with Dr. Sears or other faculty at the address below.   [facility] Dermatology at [place]  [address] [address]  [phone]    Please page [phone] for further related questions.    Jose Eduardo Arroyo MD  Resident Physician, PGY2  [facility] Dermatology  Pager: [phone] R LE Erosions and rash  - favor chronic erosion, unclear cause of injury.  - clinically there are no full thickness ulcers on exam,  no undermining violaceous borders, or no cribriform scarring that may suggest pyoderma gangrenosum.   - recommend continuing antibiotics and pain management as per primary team  - recommend frequent liberal vaseline application to the area throughout the day  - recommend starting triamcinolone 0.1% ointment BID for 2 weeks   - recommend Ultrasound of nodule superior to R medial malleolus      Patient was seen at bedside with the dermatology attending Dr. Escobar.  Dermatology team to sign off. Please reconsult with any new concerns.     For routine outpatient follow up after discharge:  Please call to schedule appointment with Dr. Sears or other faculty at the address below.   [facility] Dermatology at [place]  [address] [place] [address]  [phone]    Please page [phone] for further related questions.    Jose Eduardo Arroyo MD  Resident Physician, PGY2  [facility] Dermatology  Pager: [phone]

## 2023-07-03 NOTE — PROGRESS NOTE ADULT - SUBJECTIVE AND OBJECTIVE BOX
INTERVAL HPI/OVERNIGHT EVENTS:  - no interval changes to wound on R lower leg. Pt continues to be itchy on R lower leg and on abdomen.     MEDICATIONS  (STANDING):  enoxaparin Injectable 40 milliGRAM(s) SubCutaneous every 24 hours  lactated ringers. 1000 milliLiter(s) (100 mL/Hr) IV Continuous <Continuous>  lactobacillus acidophilus 1 Tablet(s) Oral daily  mupirocin 2% Ointment 1 Application(s) Both Nostrils two times a day  nicotine - 21 mG/24Hr(s) Patch 1 Patch Transdermal daily  petrolatum white Ointment 1 Application(s) Topical two times a day  piperacillin/tazobactam IVPB.. 3.375 Gram(s) IV Intermittent every 8 hours  vancomycin  IVPB 1500 milliGRAM(s) IV Intermittent every 12 hours    MEDICATIONS  (PRN):  acetaminophen     Tablet .. 650 milliGRAM(s) Oral every 6 hours PRN Temp greater or equal to 38C (100.4F), Mild Pain (1 - 3)  ALPRAZolam 0.5 milliGRAM(s) Oral every 24 hours PRN anxiety  aluminum hydroxide/magnesium hydroxide/simethicone Suspension 30 milliLiter(s) Oral every 4 hours PRN Dyspepsia  melatonin 3 milliGRAM(s) Oral at bedtime PRN Insomnia  ondansetron Injectable 4 milliGRAM(s) IV Push every 8 hours PRN Nausea and/or Vomiting  oxycodone    5 mG/acetaminophen 325 mG 1 Tablet(s) Oral every 6 hours PRN Moderate - Severe Pain (4 - 10)  polyethylene glycol 3350 17 Gram(s) Oral daily PRN Constipation  senna 2 Tablet(s) Oral at bedtime PRN Constipation      Allergies    No Known Allergies    Intolerances        REVIEW OF SYSTEMS    General: no fevers/chills, no lethargy  Skin/Breast: see HPI	  Ophthalmologic: no eye pain or change in vision	  ENMT: no dysphagia or change in hearing  Respiratory and Thorax: no SOB or cough  Cardiovascular: no palpitations or chest pain  Gastrointestinal: no abdominal pain or blood in stool   Genitourinary: no dysuria or frequency  Musculoskeletal: no joint pains or weakness	  Neurological: no weakness, numbness, or tingling      Vital Signs Last 24 Hrs  T(C): 36.3 (03 Jul 2023 11:52), Max: 36.8 (03 Jul 2023 05:10)  T(F): 97.3 (03 Jul 2023 11:52), Max: 98.2 (03 Jul 2023 05:10)  HR: 82 (03 Jul 2023 11:52) (68 - 82)  BP: 133/81 (03 Jul 2023 11:52) (127/74 - 133/81)  BP(mean): --  RR: 18 (03 Jul 2023 11:52) (18 - 18)  SpO2: 100% (03 Jul 2023 11:52) (98% - 100%)    Parameters below as of 03 Jul 2023 11:52  Patient On (Oxygen Delivery Method): room air          PHYSICAL EXAM:    The patient was well nourished, and in NAD.  OP showed no ulcerations.  There was no visible LAD.  Conjunctiva were non-injected.  There was no clubbing or edema of extremities.  The scalp, hair, face, eyebrows, lips, OP, neck, chest, back, extremities x4, and nails were examined.  There was no hyperhidrosis or bromhidrosis.    Of note on skin exam:   - R lower leg with hyerpigmented, scaly plaque with overlying crusted superficial erosions. no full thickness ulcers seen, no undermining violaceous borders, no cribriform scarring,   - erythematous edematous plaque on lower abdomen (limited to area just below his metal belt buckle)  - subQ firm nodule superior to medial malleolus        LABS:                        15.7   9.42  )-----------( 366      ( 03 Jul 2023 04:10 )             48.8     07-03    141  |  102  |  12  ----------------------------<  102<H>  4.3   |  25  |  1.12    Ca    9.1      03 Jul 2023 04:10  Phos  4.1     07-03  Mg     2.20     07-03    TPro  6.9  /  Alb  4.0  /  TBili  0.2  /  DBili  x   /  AST  13  /  ALT  14  /  AlkPhos  72  07-03      Urinalysis Basic - ( 03 Jul 2023 04:10 )    Color: x / Appearance: x / SG: x / pH: x  Gluc: 102 mg/dL / Ketone: x  / Bili: x / Urobili: x   Blood: x / Protein: x / Nitrite: x   Leuk Esterase: x / RBC: x / WBC x   Sq Epi: x / Non Sq Epi: x / Bacteria: x        RADIOLOGY & ADDITIONAL TESTS: reviewed; no pertinent findings

## 2023-07-03 NOTE — PROGRESS NOTE ADULT - ASSESSMENT
26 yo M w/ substance use (marijuana/cocaine/crystal meth), presenting with complaints of chronic RLE wound with worsening pain and swelling.     Pt hospitalized w/i past month at [facility] for RLE cellulitis, recieved IV abx and had LE doppler that did not show a DVT.  28 yo M w/ substance use (marijuana/cocaine/crystal meth), presenting with complaints of chronic RLE wound with worsening pain and swelling.     Pt hospitalized w/i past month at [facility] for RLE cellulitis, recieved IV abx and had LE doppler that did not show a DVT.     Historically, cocaine laced w levamasole can cause skin lesions and vasculopathy, but pt has no history of cocaine use, does use meth.

## 2023-07-03 NOTE — PROGRESS NOTE ADULT - PROBLEM SELECTOR PLAN 1
- on empiric antibiotics (vanc/zosyn) pending infectious workup (monitor vanc level to avoid nephrotoxicity), Xray negative for OM, Bcx NGTD, is afebrile/non-toxic appearing, extent of discoloration was previously demarcarted by marker  - patient denies any hx of IV drug injections   - await LE duplex to r/o DVT, on empiric anticoagulation (lovenox) pending results  - wound care consult  - await derm eval- rec's vaseline, no biopsy done  - has distal pulses  - c/w symptomatic management of pain, bowel regimen to prevent opioid induced constipation  - check ESR/CRP [ ] - on empiric antibiotics (vanc/zosyn) pending infectious workup (monitor vanc level to avoid nephrotoxicity), Xray negative for OM, Bcx NGTD, is afebrile/non-toxic appearing, extent of discoloration was previously demarcarted by marker. Currently concern for venous stasis ulceration vs cellulitis.  - patient denies any hx of IV drug injections   - await LE duplex to r/o DVT, on empiric anticoagulation (lovenox) pending results  - wound care consult  - await derm eval- rec's vaseline, no biopsy done  - has distal pulses  - c/w symptomatic management of pain, bowel regimen to prevent opioid induced constipation  - check ESR/CRP - both low - on empiric antibiotics (vanc/zosyn) pending infectious workup (monitor vanc level to avoid nephrotoxicity), Xray negative for OM, Bcx NGTD, is afebrile/non-toxic appearing, extent of discoloration was previously demarcarted by marker. Currently concern for venous stasis ulceration vs cellulitis.  - patient denies any hx of IV drug injections   - await LE duplex to r/o DVT, on empiric anticoagulation (lovenox) pending results, NEG  - wound care consult  - await derm eval- rec's vaseline, no biopsy done  - has distal pulses, f/u STARR PVRs [ ]  - c/w symptomatic management of pain, bowel regimen to prevent opioid induced constipation  - check ESR/CRP - both low  - not ideal to be continuing broad spectrum IV abx, cont IVF while on vanc/zosyn to avoid nephrotoxicity. Reconsult derm for a biopsy.

## 2023-07-03 NOTE — PROGRESS NOTE ADULT - SUBJECTIVE AND OBJECTIVE BOX
[facility] Division of Hospital Medicine  Mayra Pickering MD  Pager (EZRA-F, 8A-5P): [phone]  Other Times:  [phone]      SUBJECTIVE / OVERNIGHT EVENTS: Pt anxious about being in the hospital. Doens't usually take anything at home for anxiety. Does take ketamine, percocet (every other day), marijuana and daily meth use. Asking for xanax this am for anxiety, says it worked yesterday.    MEDICATIONS  (STANDING):  enoxaparin Injectable 95 milliGRAM(s) SubCutaneous every 12 hours  lactobacillus acidophilus 1 Tablet(s) Oral daily  nicotine - 21 mG/24Hr(s) Patch 1 Patch Transdermal daily  petrolatum white Ointment 1 Application(s) Topical two times a day  piperacillin/tazobactam IVPB.. 3.375 Gram(s) IV Intermittent every 8 hours  vancomycin  IVPB 1500 milliGRAM(s) IV Intermittent every 12 hours    MEDICATIONS  (PRN):  acetaminophen     Tablet .. 650 milliGRAM(s) Oral every 6 hours PRN Temp greater or equal to 38C (100.4F), Mild Pain (1 - 3)  ALPRAZolam 0.5 milliGRAM(s) Oral every 24 hours PRN anxiety  aluminum hydroxide/magnesium hydroxide/simethicone Suspension 30 milliLiter(s) Oral every 4 hours PRN Dyspepsia  melatonin 3 milliGRAM(s) Oral at bedtime PRN Insomnia  ondansetron Injectable 4 milliGRAM(s) IV Push every 8 hours PRN Nausea and/or Vomiting  oxycodone    5 mG/acetaminophen 325 mG 1 Tablet(s) Oral every 6 hours PRN Moderate - Severe Pain (4 - 10)  polyethylene glycol 3350 17 Gram(s) Oral daily PRN Constipation  senna 2 Tablet(s) Oral at bedtime PRN Constipation      I&O's Summary    02 Jul 2023 07:01  -  03 Jul 2023 07:00  --------------------------------------------------------  IN: 120 mL / OUT: 0 mL / NET: 120 mL        PHYSICAL EXAM:  Vital Signs Last 24 Hrs  T(C): 36.3 (03 Jul 2023 11:52), Max: 36.8 (03 Jul 2023 05:10)  T(F): 97.3 (03 Jul 2023 11:52), Max: 98.2 (03 Jul 2023 05:10)  HR: 82 (03 Jul 2023 11:52) (68 - 82)  BP: 133/81 (03 Jul 2023 11:52) (127/74 - 133/81)  BP(mean): --  RR: 18 (03 Jul 2023 11:52) (18 - 18)  SpO2: 100% (03 Jul 2023 11:52) (98% - 100%)    Parameters below as of 03 Jul 2023 11:52  Patient On (Oxygen Delivery Method): room air      CONSTITUTIONAL: NAD  EYES: PERRLA; conjunctiva and sclera clear  ENMT: Moist oral mucosa, no pharyngeal injection or exudates; normal dentition  RESPIRATORY: Normal respiratory effort; lungs are clear to auscultation bilaterally  CARDIOVASCULAR: Regular rate and rhythm, normal S1 and S2, no murmur/rub/gallop  ABDOMEN: Nontender to palpation, normoactive bowel sounds, no rebound/guarding; No hepatosplenomegaly  PSYCH: A+O to person, place, and time; affect appropriate  SKIN: RLE wound w edema, xerosis, multiple areas of erosions, noncellulitic appearing, no erythema, DP/PT pulses 2+    LABS:                        15.7   9.42  )-----------( 366      ( 03 Jul 2023 04:10 )             48.8     07-03    141  |  102  |  12  ----------------------------<  102<H>  4.3   |  25  |  1.12    Ca    9.1      03 Jul 2023 04:10  Phos  4.1     07-03  Mg     2.20     07-03    TPro  6.9  /  Alb  4.0  /  TBili  0.2  /  DBili  x   /  AST  13  /  ALT  14  /  AlkPhos  72  07-03           Urinalysis Basic - ( 03 Jul 2023 04:10 )    Color: x / Appearance: x / SG: x / pH: x  Gluc: 102 mg/dL / Ketone: x  / Bili: x / Urobili: x   Blood: x / Protein: x / Nitrite: x   Leuk Esterase: x / RBC: x / WBC x   Sq Epi: x / Non Sq Epi: x / Bacteria: x        Culture - Blood (collected 01 Jul 2023 04:25)  Source: .Blood Blood-Venous  Preliminary Report (03 Jul 2023 07:02):    No growth at 48 Hours    Culture - Blood (collected 01 Jul 2023 04:20)  Source: .Blood Blood  Preliminary Report (03 Jul 2023 07:02):    No growth at 48 Hours        RADIOLOGY & ADDITIONAL TESTS:  Results Reviewed:   Imaging Personally Reviewed:  Electrocardiogram Personally Reviewed:    COORDINATION OF CARE:  Care Discussed with Consultants/Other Providers [Y/N]: Derm  Prior or Outpatient Records Reviewed [Y/N]:

## 2023-07-03 NOTE — PROGRESS NOTE ADULT - ATTENDING COMMENTS
Patient with multiple eczematous plaques on the scalp, posterior neck, abdomen, and right lower leg. Most of the lesions on the right lower leg look inactive. There is minimal erythema of the region. Suspect contact dermatitis as an underlying source of some if not all of these rashes. Would initiate triamcinolone ointment. Patient would benefit from outpatient follow-up upon discharge from the hospital.     Mahesh Escobar MD, PharmD, MPH  Co-Director, Inpatient Dermatology Consultation Service, [facility] [place]

## 2023-07-04 LAB
ANION GAP SERPL CALC-SCNC: 13 MMOL/L — SIGNIFICANT CHANGE UP (ref 7–14)
BUN SERPL-MCNC: 11 MG/DL — SIGNIFICANT CHANGE UP (ref 7–23)
CALCIUM SERPL-MCNC: 9.2 MG/DL — SIGNIFICANT CHANGE UP (ref 8.4–10.5)
CHLORIDE SERPL-SCNC: 102 MMOL/L — SIGNIFICANT CHANGE UP (ref 98–107)
CO2 SERPL-SCNC: 22 MMOL/L — SIGNIFICANT CHANGE UP (ref 22–31)
CREAT SERPL-MCNC: 0.91 MG/DL — SIGNIFICANT CHANGE UP (ref 0.5–1.3)
EGFR: 119 ML/MIN/1.73M2 — SIGNIFICANT CHANGE UP
GLUCOSE SERPL-MCNC: 106 MG/DL — HIGH (ref 70–99)
HCT VFR BLD CALC: 50.6 % — HIGH (ref 39–50)
HGB BLD-MCNC: 15.9 G/DL — SIGNIFICANT CHANGE UP (ref 13–17)
MAGNESIUM SERPL-MCNC: 2.2 MG/DL — SIGNIFICANT CHANGE UP (ref 1.6–2.6)
MCHC RBC-ENTMCNC: 27.6 PG — SIGNIFICANT CHANGE UP (ref 27–34)
MCHC RBC-ENTMCNC: 31.4 GM/DL — LOW (ref 32–36)
MCV RBC AUTO: 87.8 FL — SIGNIFICANT CHANGE UP (ref 80–100)
NRBC # BLD: 0 /100 WBCS — SIGNIFICANT CHANGE UP (ref 0–0)
NRBC # FLD: 0 K/UL — SIGNIFICANT CHANGE UP (ref 0–0)
PHOSPHATE SERPL-MCNC: 4.2 MG/DL — SIGNIFICANT CHANGE UP (ref 2.5–4.5)
PLATELET # BLD AUTO: 392 K/UL — SIGNIFICANT CHANGE UP (ref 150–400)
POTASSIUM SERPL-MCNC: 4.3 MMOL/L — SIGNIFICANT CHANGE UP (ref 3.5–5.3)
POTASSIUM SERPL-SCNC: 4.3 MMOL/L — SIGNIFICANT CHANGE UP (ref 3.5–5.3)
RBC # BLD: 5.76 M/UL — SIGNIFICANT CHANGE UP (ref 4.2–5.8)
RBC # FLD: 12.7 % — SIGNIFICANT CHANGE UP (ref 10.3–14.5)
SODIUM SERPL-SCNC: 137 MMOL/L — SIGNIFICANT CHANGE UP (ref 135–145)
WBC # BLD: 11.77 K/UL — HIGH (ref 3.8–10.5)
WBC # FLD AUTO: 11.77 K/UL — HIGH (ref 3.8–10.5)

## 2023-07-04 PROCEDURE — 99232 SBSQ HOSP IP/OBS MODERATE 35: CPT

## 2023-07-04 RX ORDER — ALPRAZOLAM 0.25 MG
0.5 TABLET ORAL ONCE
Refills: 0 | Status: DISCONTINUED | OUTPATIENT
Start: 2023-07-04 | End: 2023-07-04

## 2023-07-04 RX ADMIN — Medication 1 TABLET(S): at 16:55

## 2023-07-04 RX ADMIN — Medication 3 MILLIGRAM(S): at 21:42

## 2023-07-04 RX ADMIN — Medication 0.5 MILLIGRAM(S): at 21:42

## 2023-07-04 RX ADMIN — Medication 1 APPLICATION(S): at 05:39

## 2023-07-04 RX ADMIN — Medication 1 APPLICATION(S): at 16:56

## 2023-07-04 RX ADMIN — Medication 0.5 MILLIGRAM(S): at 17:15

## 2023-07-04 RX ADMIN — Medication 1 APPLICATION(S): at 16:57

## 2023-07-04 RX ADMIN — MUPIROCIN 1 APPLICATION(S): 20 OINTMENT TOPICAL at 05:41

## 2023-07-04 RX ADMIN — PIPERACILLIN AND TAZOBACTAM 25 GRAM(S): 4; .5 INJECTION, POWDER, LYOPHILIZED, FOR SOLUTION INTRAVENOUS at 05:38

## 2023-07-04 RX ADMIN — Medication 1 PATCH: at 16:55

## 2023-07-04 RX ADMIN — Medication 0.5 MILLIGRAM(S): at 09:00

## 2023-07-04 RX ADMIN — ENOXAPARIN SODIUM 40 MILLIGRAM(S): 100 INJECTION SUBCUTANEOUS at 05:39

## 2023-07-04 RX ADMIN — MUPIROCIN 1 APPLICATION(S): 20 OINTMENT TOPICAL at 16:56

## 2023-07-04 RX ADMIN — Medication 300 MILLIGRAM(S): at 05:34

## 2023-07-04 RX ADMIN — Medication 1 PATCH: at 15:13
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
## 2023-07-04 NOTE — PROGRESS NOTE ADULT - PROBLEM SELECTOR PLAN 1
- was on empiric antibiotics (vanc/zosyn) pending infectious workup, Xray negative for OM, ESR/CRP low, Bcx NGTD, patient is afebrile/non-toxic appearing, extent of discoloration was previously demarcated by marker (has not extended past borders), has tried abx without reported improvement  - as such will monitor off further abx at this time, reassess  - seen by dermatology, suspecting chronic erosions, low suspicion for pyoderma gangrenosum, possible component of contact dermatitis  - c/w vaseline application and triamcinolone ointment as per derm, otpt derm followup for further monitoring  - LE duplex negative for DVT, patient switched to prophylactic lovenox  - has distal pulses, f/u STARR PVRs  - c/w symptomatic management of pain, bowel regimen to prevent opioid induced constipation

## 2023-07-04 NOTE — PROGRESS NOTE ADULT - ASSESSMENT
26 yo M w/ substance use (marijuana/cocaine/crystal meth), presenting with complaints of chronic RLE wound with worsening pain and swelling. Duplex negative for DVT, evaluated by dermatology, started on vaseline and triamcinolone.

## 2023-07-04 NOTE — PROGRESS NOTE ADULT - SUBJECTIVE AND OBJECTIVE BOX
[facility] Medicine  Pager [phone]    Patient is a 27y old  Male who presents with a chief complaint of RT LE wound (03 Jul 2023 19:07)      INTERVAL HPI/OVERNIGHT EVENTS:    MEDICATIONS  (STANDING):  enoxaparin Injectable 40 milliGRAM(s) SubCutaneous every 24 hours  lactated ringers. 1000 milliLiter(s) (100 mL/Hr) IV Continuous <Continuous>  lactobacillus acidophilus 1 Tablet(s) Oral daily  mupirocin 2% Ointment 1 Application(s) Both Nostrils two times a day  nicotine - 21 mG/24Hr(s) Patch 1 Patch Transdermal daily  petrolatum white Ointment 1 Application(s) Topical two times a day  triamcinolone 0.1% Ointment 1 Application(s) Topical every 12 hours    MEDICATIONS  (PRN):  acetaminophen     Tablet .. 650 milliGRAM(s) Oral every 6 hours PRN Temp greater or equal to 38C (100.4F), Mild Pain (1 - 3)  ALPRAZolam 0.5 milliGRAM(s) Oral every 24 hours PRN anxiety  aluminum hydroxide/magnesium hydroxide/simethicone Suspension 30 milliLiter(s) Oral every 4 hours PRN Dyspepsia  melatonin 3 milliGRAM(s) Oral at bedtime PRN Insomnia  ondansetron Injectable 4 milliGRAM(s) IV Push every 8 hours PRN Nausea and/or Vomiting  oxycodone    5 mG/acetaminophen 325 mG 1 Tablet(s) Oral every 6 hours PRN Moderate - Severe Pain (4 - 10)  polyethylene glycol 3350 17 Gram(s) Oral daily PRN Constipation  senna 2 Tablet(s) Oral at bedtime PRN Constipation      Allergies    No Known Allergies    Intolerances        REVIEW OF SYSTEMS:  Please see interval HPI:    Vital Signs Last 24 Hrs  T(C): 36.8 (04 Jul 2023 12:00), Max: 36.8 (04 Jul 2023 12:00)  T(F): 98.3 (04 Jul 2023 12:00), Max: 98.3 (04 Jul 2023 12:00)  HR: 75 (04 Jul 2023 12:00) (73 - 91)  BP: 120/68 (04 Jul 2023 12:00) (120/68 - 141/91)  BP(mean): --  RR: 18 (04 Jul 2023 12:00) (18 - 18)  SpO2: 100% (04 Jul 2023 12:00) (98% - 100%)    Parameters below as of 04 Jul 2023 12:00  Patient On (Oxygen Delivery Method): room air      I&O's Detail    03 Jul 2023 07:01  -  04 Jul 2023 07:00  --------------------------------------------------------  IN:    Oral Fluid: 120 mL  Total IN: 120 mL    OUT:  Total OUT: 0 mL    Total NET: 120 mL            PHYSICAL EXAM:  GENERAL:   HEAD:    EYES:   ENMT:   NECK:   NERVOUS SYSTEM:    CHEST/LUNG:   HEART:   ABDOMEN:   EXTREMITIES:    LYMPH:   SKIN:     LABS:                        15.9   11.77 )-----------( 392      ( 04 Jul 2023 03:30 )             50.6     04 Jul 2023 03:30    137    |  102    |  11     ----------------------------<  106    4.3     |  22     |  0.91     Ca    9.2        04 Jul 2023 03:30  Phos  4.2       04 Jul 2023 03:30  Mg     2.20      04 Jul 2023 03:30        CAPILLARY BLOOD GLUCOSE        BLOOD CULTURE  07-01 @ 04:25   No growth at 72 Hours  --  --  07-01 @ 04:20   No growth at 72 Hours  --  --    RADIOLOGY & ADDITIONAL TESTS:    Imaging Personally Reviewed:  [ ] YES     Consultant(s) Notes Reviewed:      Care Discussed with Consultants/Other Providers: [facility] Medicine  Pager [phone]    Patient is a 27y old  Male who presents with a chief complaint of RT LE wound (03 Jul 2023 19:07)      INTERVAL HPI/OVERNIGHT EVENTS:  Sleeping but arousable to voice. Denies fever/chills. Discussed dermatology evaluation and recommendations. Does note RLE skin is bit better (not as dry/scaly). Overall, non-toxic appearing, cultures negative, will monitor off further abx at this time, patient in agreement with this. Amenable to talking to psych, would like assistance with managing symptoms, willing to try different methods for cessation of substance use. No other questions/concerns at this time.     MEDICATIONS  (STANDING):  enoxaparin Injectable 40 milliGRAM(s) SubCutaneous every 24 hours  lactated ringers. 1000 milliLiter(s) (100 mL/Hr) IV Continuous <Continuous>  lactobacillus acidophilus 1 Tablet(s) Oral daily  mupirocin 2% Ointment 1 Application(s) Both Nostrils two times a day  nicotine - 21 mG/24Hr(s) Patch 1 Patch Transdermal daily  petrolatum white Ointment 1 Application(s) Topical two times a day  triamcinolone 0.1% Ointment 1 Application(s) Topical every 12 hours    MEDICATIONS  (PRN):  acetaminophen     Tablet .. 650 milliGRAM(s) Oral every 6 hours PRN Temp greater or equal to 38C (100.4F), Mild Pain (1 - 3)  ALPRAZolam 0.5 milliGRAM(s) Oral every 24 hours PRN anxiety  aluminum hydroxide/magnesium hydroxide/simethicone Suspension 30 milliLiter(s) Oral every 4 hours PRN Dyspepsia  melatonin 3 milliGRAM(s) Oral at bedtime PRN Insomnia  ondansetron Injectable 4 milliGRAM(s) IV Push every 8 hours PRN Nausea and/or Vomiting  oxycodone    5 mG/acetaminophen 325 mG 1 Tablet(s) Oral every 6 hours PRN Moderate - Severe Pain (4 - 10)  polyethylene glycol 3350 17 Gram(s) Oral daily PRN Constipation  senna 2 Tablet(s) Oral at bedtime PRN Constipation    Allergies  No Known Allergies    Intolerances    REVIEW OF SYSTEMS:  Please see interval HPI:    Vital Signs Last 24 Hrs  T(C): 36.8 (04 Jul 2023 12:00), Max: 36.8 (04 Jul 2023 12:00)  T(F): 98.3 (04 Jul 2023 12:00), Max: 98.3 (04 Jul 2023 12:00)  HR: 75 (04 Jul 2023 12:00) (73 - 91)  BP: 120/68 (04 Jul 2023 12:00) (120/68 - 141/91)  RR: 18 (04 Jul 2023 12:00) (18 - 18)  SpO2: 100% (04 Jul 2023 12:00) (98% - 100%)    Parameters below as of 04 Jul 2023 12:00  Patient On (Oxygen Delivery Method): room air      I&O's Detail    03 Jul 2023 07:01  -  04 Jul 2023 07:00  --------------------------------------------------------  IN:    Oral Fluid: 120 mL  Total IN: 120 mL    OUT:  Total OUT: 0 mL    Total NET: 120 mL    PHYSICAL EXAM:  GENERAL: NAD, sleeping but arousable to voice  HEAD:  NC/AT  EYES: EOMI, clear sclera/conjunctiva  ENMT: MMM, hearing intact to voice  NECK: supple  NERVOUS SYSTEM: moving all extremities, non-focal    CHEST/LUNG: Comfortable on RA, speaking in full sentences  EXTREMITIES:  RLE w/ hyperpigmentation, less scaling today, some superficial erosions      LABS:                        15.9   11.77 )-----------( 392      ( 04 Jul 2023 03:30 )             50.6     04 Jul 2023 03:30    137    |  102    |  11     ----------------------------<  106    4.3     |  22     |  0.91     Ca    9.2        04 Jul 2023 03:30  Phos  4.2       04 Jul 2023 03:30  Mg     2.20      04 Jul 2023 03:30        CAPILLARY BLOOD GLUCOSE        BLOOD CULTURE  07-01 @ 04:25   No growth at 72 Hours  --  --  07-01 @ 04:20   No growth at 72 Hours  --  --    RADIOLOGY & ADDITIONAL TESTS:    Imaging Personally Reviewed:  [ ] YES  < from: US Duplex Venous Lower Ext Complete, Bilateral (07.03.23 @ 09:40) >  FINDINGS:    RIGHT:  Normal compressibility of the RIGHT common femoral, femoral and popliteal   veins.  Doppler examination shows normal spontaneous and phasic flow.  No RIGHT calf vein thrombosis is detected.    LEFT:  Normal compressibility of the LEFT common femoral, femoral and popliteal   veins.  Doppler examination shows normal spontaneous and phasic flow.  No LEFT calf vein thrombosis is detected.    IMPRESSION:  No evidence of deep venous thrombosis in either lower extremity.    < end of copied text >       Consultant(s) Notes Reviewed:  Derm    Care Discussed with Consultants/Other Providers: HOLLY Olmedo re: pending psych f/u, derm recs

## 2023-07-05 ENCOUNTER — TRANSCRIPTION ENCOUNTER (OUTPATIENT)
Age: 28
End: 2023-07-05

## 2023-07-05 VITALS
TEMPERATURE: 98 F | RESPIRATION RATE: 18 BRPM | DIASTOLIC BLOOD PRESSURE: 74 MMHG | OXYGEN SATURATION: 100 % | HEART RATE: 70 BPM | SYSTOLIC BLOOD PRESSURE: 123 MMHG

## 2023-07-05 DIAGNOSIS — F12.10 CANNABIS ABUSE, UNCOMPLICATED: ICD-10-CM

## 2023-07-05 DIAGNOSIS — F17.200 NICOTINE DEPENDENCE, UNSPECIFIED, UNCOMPLICATED: ICD-10-CM

## 2023-07-05 PROCEDURE — 90792 PSYCH DIAG EVAL W/MED SRVCS: CPT

## 2023-07-05 PROCEDURE — 99239 HOSP IP/OBS DSCHRG MGMT >30: CPT

## 2023-07-05 RX ORDER — NICOTINE POLACRILEX 2 MG
4 GUM BUCCAL DAILY
Refills: 0 | Status: DISCONTINUED | OUTPATIENT
Start: 2023-07-05 | End: 2023-07-05

## 2023-07-05 RX ORDER — LANOLIN ALCOHOL/MO/W.PET/CERES
1 CREAM (GRAM) TOPICAL
Qty: 0 | Refills: 0 | DISCHARGE
Start: 2023-07-05

## 2023-07-05 RX ORDER — ALPRAZOLAM 0.25 MG
0.5 TABLET ORAL
Refills: 0 | Status: DISCONTINUED | OUTPATIENT
Start: 2023-07-05 | End: 2023-07-05

## 2023-07-05 RX ORDER — NICOTINE POLACRILEX 2 MG
1 GUM BUCCAL
Qty: 30 | Refills: 0
Start: 2023-07-05 | End: 2023-08-03

## 2023-07-05 RX ORDER — PETROLATUM,WHITE
1 JELLY (GRAM) TOPICAL
Qty: 0 | Refills: 0 | DISCHARGE
Start: 2023-07-05

## 2023-07-05 RX ADMIN — MUPIROCIN 1 APPLICATION(S): 20 OINTMENT TOPICAL at 06:35

## 2023-07-05 RX ADMIN — Medication 1 APPLICATION(S): at 06:35

## 2023-07-05 RX ADMIN — Medication 1 APPLICATION(S): at 06:36

## 2023-07-05 RX ADMIN — Medication 0.5 MILLIGRAM(S): at 12:35

## 2023-07-05 RX ADMIN — Medication 1 TABLET(S): at 11:27

## 2023-07-05 RX ADMIN — Medication 1 PATCH: at 08:35

## 2023-07-05 NOTE — BH CONSULTATION LIAISON ASSESSMENT NOTE - RISK ASSESSMENT
Chronic risks include polysubstance use and ongoing irritability/anger. Does deny any mood symptoms, SI/HI, weapons in the home or psychosis. Has no prior history of psychiatric conditions or hospitalizations. Lives at home with parents and brother, but currently unemployed. Currently appears to be withdrawing with increasing anger. Risk to leave AMA and increased risk of violence towards others. Chronic risks include polysubstance use and ongoing irritability/anger. Does deny any mood symptoms, SI/HI, weapons in the home or psychosis. Has no prior history of psychiatric conditions or hospitalizations. Lives at home with parents and brother, but currently unemployed. There is a degree of craving+ withdrawal that is contributing to his ongoing irritability. Risk to leave AMA and increased risk of aggression towards others--- although has not been aggressive or agitated while here at [facility]

## 2023-07-05 NOTE — PROGRESS NOTE ADULT - ASSESSMENT
28 yo M w/ substance use (marijuana/cocaine/crystal meth), presenting with complaints of chronic RLE wound with worsening pain and swelling. Duplex negative for DVT, evaluated by dermatology, started on vaseline and triamcinolone. Bx results pending [ ]. Now off abx since 7/4. STARR PVR pending [ ]. Addiction Psych consulted, multiple BERTs this admission.

## 2023-07-05 NOTE — DISCHARGE NOTE NURSING/CASE MANAGEMENT/SOCIAL WORK - NSDCFUADDAPPT_GEN_ALL_CORE_FT
[facility]  [address], [address], [address] [place] [address]  [phone]    You may follow up at a vascular clinic for STARR/PVR. Vascular: [address] [place] [address]    Please call to schedule appointment with Dr. Sears or other faculty at the address below.   [facility] [place]  [address] [address], [place] [address]  [phone]

## 2023-07-05 NOTE — BH CONSULTATION LIAISON ASSESSMENT NOTE - NSBHCONSULTFOLLOWAFTERCARE_PSY_A_CORE FT
[facility]  [address], [address], [address] [place] [address] [phone]

## 2023-07-05 NOTE — PROGRESS NOTE ADULT - PROBLEM SELECTOR PROBLEM 1
Pain and swelling of right lower extremity

## 2023-07-05 NOTE — BH CONSULTATION LIAISON ASSESSMENT NOTE - NSBHATTESTCOMMENTATTENDFT_PSY_A_CORE
met with the patient along with ms3, impression and plan discussed and agreed upon.   Patient was rather irritable, andrea, perseverating on the need for d/c, to smoke a cigarette etc. No evident pervasive mood symptoms, denies any si or hi, and no evident psychosis. Not keen on sobriety at this time, and not keen on substance abuse treatment.

## 2023-07-05 NOTE — BH CONSULTATION LIAISON ASSESSMENT NOTE - HPI (INCLUDE ILLNESS QUALITY, SEVERITY, DURATION, TIMING, CONTEXT, MODIFYING FACTORS, ASSOCIATED SIGNS AND SYMPTOMS)
Patient is a 27 year old male domiciled at home with family, unemployed, history of polysubstance abuse, including 1 PPD active smoker, marijuana every other day and crystal meth daily, with no past psychiatric history and no past medical history who presented to [facility] with worsening of his chronic RLE pain and swelling. Patient admitted for workup for leg swelling. Psychiatry consulted for history of polysubstance abuse.    Patient reports that he feels anxious today. States that he feels this way whenever he does not smoke a cigarette/crystal meth for a few days and thinks he may be withdrawing. Notes that he has a bad headache and feels hot/sweaty. States that he smokes 1-1.5 PPD and smokes crystal meth daily. Smokes marijuana every other day. Was in rehab once for crystal meth, but resumed using afterwards. Does not intend to quit at this time. He does also feel irritated/angry today. He wants to smoke a cigarette, because he has not smoked cigarettes or crystal meth since admission 4 days ago. Feels nicotine patch is not helping him and is not interested in gum/lozenges. He is also angry due to continued uncertainty about his leg. He is a , but is currently unemployed. He lives at home with his mother, father and younger brother. He is not in a relationship.    Denies ever taking medication for anxiety, but does state he sometimes takes Xanax 1mg if smoking doesn't alleviate that anxiety. Denies any prior history of psychiatric conditions or hospitalizations. Denies SI/HI or guns in the home. Does note he has a prior history of arrest. Does sometimes feel sad, but denies anhedonia or changes in appetite/weight/energy/concentration associated with the feeling. Denies any AH/VH. Denies any symptoms of zaid, although he does sometimes go a while without sleeping when he is high.

## 2023-07-05 NOTE — BH CONSULTATION LIAISON ASSESSMENT NOTE - SUMMARY
Patient is a 27 year old male domiciled at home with family, unemployed, history of polysubstance abuse, including 1 PPD active smoker, marijuana every other day and crystal meth daily, with no past psychiatric history and no past medical history who presented to [facility] with worsening of his chronic RLE pain and swelling. Patient admitted for workup for leg swelling. Psychiatry consulted for history of polysubstance abuse.    Based on assessment done today, patient denies any symptoms of depression, zaid or AH/VH. Does feel angry/irritable in the setting of 4 days since last smoking cigarette and crystal meth. Feels nicotine patch is not helping him. Oriented x3. Appears that he may be withdrawing from polysubstances and with increasing anger. At risk to leave AMA. Not currently interested in discussing any programs for substance abuse.    Recommendations  - No psychiatric contraindications for discharge.  - No indication for 1:1 CO.  - Continue nicotine patch 21mg daily.  - Continue Xanax 0.5 mg for anxiety, not to be continued after d/c.  - Check EKG--- none in chart  - Only for combative behaviors if any---- Zyprexa 5mg q 6hrs prn IM------------ If QTC < 500ms    Plan to be discussed with attending and not finalized until attending signs note. Patient is a 27 year old male domiciled at home with family, unemployed, history of polysubstance abuse, including 1 PPD active smoker, marijuana every other day and crystal meth daily, with no past psychiatric history and no past medical history who presented to [facility] with worsening of his chronic RLE pain and swelling. Patient admitted for workup for leg swelling. Psychiatry consulted for history of polysubstance abuse.    Based on assessment done today, patient denies any symptoms of depression, zaid or AH/VH. Does feel angry/irritable in the setting of 4 days since last smoking cigarette and crystal meth. Feels nicotine patch is not helping him. Oriented x3. Suspecting that there is a degree of craving+ withdrawal that is contributing to his ongoing irritability. Not currently interested in discussing any programs for substance abuse.    Recommendations  - No psychiatric contraindications for discharge.  - No indication for 1:1 CO.  - Continue nicotine patch 21mg daily. Can offer prn lozenges as well  - Continue Xanax 0.5 mg PRN for anxiety, not to be continued after d/c.  - Check EKG--- none in chart  - Only for combative behaviors if any---- Zyprexa 5mg q 6hrs prn IM------------ If QTC < 500ms

## 2023-07-05 NOTE — BH CONSULTATION LIAISON ASSESSMENT NOTE - CURRENT MEDICATION
MEDICATIONS  (STANDING):  enoxaparin Injectable 40 milliGRAM(s) SubCutaneous every 24 hours  lactated ringers. 1000 milliLiter(s) (100 mL/Hr) IV Continuous <Continuous>  lactobacillus acidophilus 1 Tablet(s) Oral daily  mupirocin 2% Ointment 1 Application(s) Both Nostrils two times a day  nicotine  Polacrilex Gum 4 milliGRAM(s) Oral daily  nicotine - 21 mG/24Hr(s) Patch 1 Patch Transdermal daily  petrolatum white Ointment 1 Application(s) Topical two times a day  triamcinolone 0.1% Ointment 1 Application(s) Topical every 12 hours    MEDICATIONS  (PRN):  acetaminophen     Tablet .. 650 milliGRAM(s) Oral every 6 hours PRN Temp greater or equal to 38C (100.4F), Mild Pain (1 - 3)  ALPRAZolam 0.5 milliGRAM(s) Oral two times a day PRN anxiety  aluminum hydroxide/magnesium hydroxide/simethicone Suspension 30 milliLiter(s) Oral every 4 hours PRN Dyspepsia  melatonin 3 milliGRAM(s) Oral at bedtime PRN Insomnia  ondansetron Injectable 4 milliGRAM(s) IV Push every 8 hours PRN Nausea and/or Vomiting  oxycodone    5 mG/acetaminophen 325 mG 1 Tablet(s) Oral every 6 hours PRN Moderate - Severe Pain (4 - 10)  polyethylene glycol 3350 17 Gram(s) Oral daily PRN Constipation  senna 2 Tablet(s) Oral at bedtime PRN Constipation

## 2023-07-05 NOTE — DISCHARGE NOTE NURSING/CASE MANAGEMENT/SOCIAL WORK - WAS YOUR LAST COVID-19 VACCINE GREATER THAN OR EQUAL TO TWO MONTHS AGO?
[place] ambulatory encounter  URGENT CARE OFFICE VISIT    SUBJECTIVE:  Trinity Lin is a 58 year old female who presented requesting evaluation for sinus congestion. Symptoms started 6 days ago. There is not associated fever. There is scant purulent nasal drainage. Other symptoms include watery eyes, facial pressure, subjective fevers. It's a frequent nonproductive cough. Patient is taking ibuprofen and alavert for symptoms.    Sick contacts include: none.     States she has a history of asthma, mold allergies, sinus infections. States she's felt a bit short of breath but denies chest pain. States she has an inhaler at home.        Review of systems:    A review of systems was performed and findings relevant to these symptoms are included in the HPI.     PAST HISTORIES:    ALLERGIES:   Allergen Reactions   • Molds & Smuts RASH and PRURITUS     Sinus pressure and drainage   • Dust    • Mold   (Environmental)    • Ragweed PRURITUS       MEDICATIONS:  Current Outpatient Medications   Medication Sig   • Escitalopram Oxalate (LEXAPRO PO)    • naproxen (NAPROSYN) 500 MG tablet Take 1 tablet by mouth twice daily as needed for pain.   • albuterol 108 (90 BASE) MCG/ACT inhaler Inhale 2 puffs into the lungs every 4 hours as needed.   • loratadine (CLARITIN) 10 MG tablet Take 10 mg by mouth daily.   • benzonatate (TESSALON PERLES) 200 MG capsule Take 1 capsule by mouth 3 times daily as needed for Cough.   • methocarbamol (ROBAXIN-750) 750 MG tablet Take 1 tablet by mouth three times daily as needed   • Fluticasone Propionate, Inhal, 100 MCG/BLIST AEPB Inhale  into the lungs.     No current facility-administered medications for this visit.          Past Medical History:   Diagnosis Date   • Negative History of Cancer    • Negative History of Deep vein thrombosis    • Negative History of Diabetes    • Negative History of Heart disease    • Negative History of Hypertension    • RAD (reactive airway disease)      No past  surgical history on file.    OBJECTIVE:  Physical Exam:    Visit Vitals  /76   Pulse 96   Temp 98 °F (36.7 °C) (Temporal)   Resp 16   Ht 5' 4\" (1.626 m)   Wt 74.8 kg   LMP 08/15/2008   SpO2 100%   BMI 28.32 kg/m²        CONSTITUTIONAL: Well-hydrated, well-nourished female who appears to be in no acute distress. Awake, alert and cooperative.  HEENT: TMs are clear bilaterally. External ears without deformities. Hearing is grossly normal. Nose without deformities. There is moist nasal mucosa. Posterior oropharynx shows mild erythema without tonsillar hypertrophy. There is frontal sinus tenderness to palpation. There is maxillary sinus tenderness to palpation.  NECK: Negative for cervical lymphadenopathy. There is full range of motion. There is no tenderness noted.  LUNGS: Clear to auscultation bilaterally. No wheezes, rales or rhonchi noted.   CARDIOVASCULAR: Regular rate and rhythm with normal S1 and S2. There are no murmurs auscultated.   SKIN: Warm, dry, intact without rash or lesion.  NEUROLOGIC: Alert and oriented x3.  PSYCHIATRIC:  Speech and behavior appropriate. Normal mood and affect.    Assessment / Plan:  1. Viral sinusitis        58-year-old female with 6 days of sinus congestion, watery eyes, dry cough. Vitals are stable here. No wheezing. I discussed with patient this is most likely a viral infection and I recommended symptomatically at home. Did provide benzonatate for cough control at home. Counseled on warning signs that should prompt return. Otherwise, recommended following up with her PCP.    Orders Placed This Encounter   • benzonatate (TESSALON PERLES) 200 MG capsule         FOLLOW-UP:  With PCP    PATIENT INSTRUCTIONS:    1. This is likely a viral infection. Viral sinus infections can take up to 10 days to resolve.     2. Medications that can help with sinus infections include guaifenesin (Mucinex), ibuprofen, and tylenol. Saline nasal sprays help flush the infection out of the sinuses. If  you can tolerate it, Neti pots work very well for sinus infections.    3. Nasal sprays like Flonase (fluticasone) can relieve congestion. Start with 2 sprays in each nostril until symptoms improve, then you can continue with 1 spray in each nostril for as long as needed. Afrin (oxymetazoline) can also relieve congestion, but do not use it for more than 3 days as after that it can make the congestion worse.      4. Drink lots of fluids. This helps your body flush out the infection. Rest as much as you can.    5. I recommend calling your primary care provider tomorrow to update them on your course of treatment. Recommend following up with them for new, worsening, or persistent symptoms.     5. If you develop high fevers (100.4 or higher), increasing facial pain, symptoms last longer than 10 days, or you have other new or worsening symptoms, please come back to be seen.        The patient was advised to follow up with primary physician or to recheck with the urgent care clinic sooner if symptoms get worse or if new symptoms appear.    The patient indicated understanding of the diagnosis and agreed with the plan of care.    Patient was seen under the supervision of Dr. Usman Augustin.             Yes

## 2023-07-05 NOTE — PROGRESS NOTE ADULT - PROBLEM SELECTOR PLAN 3
VTE ppx: lovenox  Regular diet  Dispo: anticipate home pending further improvement, with outpatient derm followup, substance abuse cessation resources
VTE ppx: lovenox  Regular diet  Dispo: anticipate home pending further improvement, with outpatient derm followup, substance abuse cessation resources
VTE ppx: already therapeutically anticoagulated (empiric tx pending duplex results)  Regular diet  Dispo: anticipate home pending workup, with continued wound care
VTE ppx: already therapeutically anticoagulated (empiric tx pending duplex results)  Regular diet  Dispo: anticipate home pending workup, with continued wound care

## 2023-07-05 NOTE — PROGRESS NOTE ADULT - PROBLEM SELECTOR PLAN 2
- utox pos- THC, opiates, meth  - reports use of multiple substances but denies IVDU (afraid of needles)  -  cessation, SBIRT assistance appreciated,  consulted due to concern for opiate withdrawal symptoms
- reports use of multiple substances but denies IVDU (afraid of needles)  - utox positive for cocaine, amphetamine, oxycodone, THC  -  cessation, SBIRT assistance appreciated.  - continue with nicotine replacement therapy  - f/u MICHAEL cole re: anxiety and assistance with substance abuse cessation
- reports use of multiple substances but denies IVDU (afraid of needles)  - utox positive for cocaine, amphetamine, oxycodone, THC  -  cessation, SBIRT assistance appreciated.  - continue with nicotine replacement therapy  - f/u MICHAEL cole re: anxiety and assistance with substance abuse cessation
- utox pending  - reports use of multiple substances but denies IVDU (afraid of needles)  -  cessation, SBIRT assistance appreciated

## 2023-07-05 NOTE — BH CONSULTATION LIAISON ASSESSMENT NOTE - NSBHCHARTREVIEWVS_PSY_A_CORE FT
Vital Signs Last 24 Hrs  T(C): 36.7 (05 Jul 2023 12:35), Max: 36.7 (05 Jul 2023 12:35)  T(F): 98 (05 Jul 2023 12:35), Max: 98 (05 Jul 2023 12:35)  HR: 70 (05 Jul 2023 12:35) (70 - 70)  BP: 123/74 (05 Jul 2023 12:35) (123/74 - 123/74)  BP(mean): --  RR: 18 (05 Jul 2023 12:35) (18 - 18)  SpO2: 100% (05 Jul 2023 12:35) (100% - 100%)    Parameters below as of 05 Jul 2023 12:35  Patient On (Oxygen Delivery Method): room air

## 2023-07-05 NOTE — BH CONSULTATION LIAISON ASSESSMENT NOTE - NSBHCHARTREVIEWLAB_PSY_A_CORE FT
CBC Full  -  ( 04 Jul 2023 03:30 )  WBC Count : 11.77 K/uL  RBC Count : 5.76 M/uL  Hemoglobin : 15.9 g/dL  Hematocrit : 50.6 %  Platelet Count - Automated : 392 K/uL  Mean Cell Volume : 87.8 fL  Mean Cell Hemoglobin : 27.6 pg  Mean Cell Hemoglobin Concentration : 31.4 gm/dL  Auto Neutrophil # : x  Auto Lymphocyte # : x  Auto Monocyte # : x  Auto Eosinophil # : x  Auto Basophil # : x  Auto Neutrophil % : x  Auto Lymphocyte % : x  Auto Monocyte % : x  Auto Eosinophil % : x  Auto Basophil % : x    CMP 07-04    137  |  102  |  11  ----------------------------<  106<H>  4.3   |  22  |  0.91    Ca    9.2      04 Jul 2023 03:30  Phos  4.2     07-04  Mg     2.20     07-04
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
## 2023-07-05 NOTE — DISCHARGE NOTE NURSING/CASE MANAGEMENT/SOCIAL WORK - PATIENT PORTAL LINK FT
You can access the FollowMyHealth Patient Portal offered by [facility] by registering at the following website: [email or web address]. By joining [facility]’s FollowMyHealth portal, you will also be able to view your health information using other applications (apps) compatible with our system.

## 2023-07-06 LAB
CULTURE RESULTS: SIGNIFICANT CHANGE UP
CULTURE RESULTS: SIGNIFICANT CHANGE UP
SPECIMEN SOURCE: SIGNIFICANT CHANGE UP
SPECIMEN SOURCE: SIGNIFICANT CHANGE UP

## 2023-07-11 ENCOUNTER — APPOINTMENT (OUTPATIENT)
Dept: DERMATOLOGY | Facility: CLINIC | Age: 28
End: 2023-07-11

## 2023-07-13 ENCOUNTER — APPOINTMENT (OUTPATIENT)
Dept: VASCULAR SURGERY | Facility: CLINIC | Age: 28
End: 2023-07-13
Payer: COMMERCIAL

## 2023-07-13 ENCOUNTER — TRANSCRIPTION ENCOUNTER (OUTPATIENT)
Age: 28
End: 2023-07-13

## 2023-07-13 PROCEDURE — 93923 UPR/LXTR ART STDY 3+ LVLS: CPT

## 2023-07-18 ENCOUNTER — APPOINTMENT (OUTPATIENT)
Dept: DERMATOLOGY | Facility: CLINIC | Age: 28
End: 2023-07-18
Payer: COMMERCIAL

## 2023-07-18 PROCEDURE — 99214 OFFICE O/P EST MOD 30 MIN: CPT

## 2023-07-18 RX ORDER — MUPIROCIN 20 MG/G
2 OINTMENT TOPICAL
Qty: 1 | Refills: 0 | Status: ACTIVE | COMMUNITY
Start: 2023-07-18 | End: 1900-01-01

## 2023-07-21 ENCOUNTER — NON-APPOINTMENT (OUTPATIENT)
Age: 28
End: 2023-07-21

## 2023-07-26 ENCOUNTER — APPOINTMENT (OUTPATIENT)
Dept: CARDIOLOGY | Facility: CLINIC | Age: 28
End: 2023-07-26

## 2023-07-26 DIAGNOSIS — F19.90 OTHER PSYCHOACTIVE SUBSTANCE USE, UNSPECIFIED, UNCOMPLICATED: ICD-10-CM

## 2023-08-15 ENCOUNTER — APPOINTMENT (OUTPATIENT)
Dept: DERMATOLOGY | Facility: CLINIC | Age: 28
End: 2023-08-15

## 2023-08-21 ENCOUNTER — APPOINTMENT (OUTPATIENT)
Dept: DERMATOLOGY | Facility: CLINIC | Age: 28
End: 2023-08-21

## 2023-08-29 ENCOUNTER — APPOINTMENT (OUTPATIENT)
Dept: INTERNAL MEDICINE | Facility: CLINIC | Age: 28
End: 2023-08-29
Payer: COMMERCIAL

## 2023-08-29 ENCOUNTER — NON-APPOINTMENT (OUTPATIENT)
Age: 28
End: 2023-08-29

## 2023-08-29 VITALS
HEIGHT: 71 IN | OXYGEN SATURATION: 98 % | SYSTOLIC BLOOD PRESSURE: 128 MMHG | BODY MASS INDEX: 27.58 KG/M2 | HEART RATE: 88 BPM | DIASTOLIC BLOOD PRESSURE: 80 MMHG | TEMPERATURE: 97.7 F | WEIGHT: 197 LBS

## 2023-08-29 DIAGNOSIS — L30.9 DERMATITIS, UNSPECIFIED: ICD-10-CM

## 2023-08-29 DIAGNOSIS — Z00.00 ENCOUNTER FOR GENERAL ADULT MEDICAL EXAMINATION W/OUT ABNORMAL FINDINGS: ICD-10-CM

## 2023-08-29 DIAGNOSIS — R41.840 ATTENTION AND CONCENTRATION DEFICIT: ICD-10-CM

## 2023-08-29 PROCEDURE — 93000 ELECTROCARDIOGRAM COMPLETE: CPT | Mod: 59

## 2023-08-29 PROCEDURE — 99385 PREV VISIT NEW AGE 18-39: CPT | Mod: 25

## 2023-08-29 PROCEDURE — 99406 BEHAV CHNG SMOKING 3-10 MIN: CPT | Mod: 25

## 2023-08-29 NOTE — HEALTH RISK ASSESSMENT
[No] : No [Yes] : In the past 12 months have you used drugs other than those required for medical reasons? Yes [0] : 2) Feeling down, depressed, or hopeless: Not at all (0) [PHQ-2 Negative - No further assessment needed] : PHQ-2 Negative - No further assessment needed [Unemployed] : unemployed [Single] : single [# Of Children ___] : has [unfilled] children [Sexually Active] : sexually active [Feels Safe at Home] : Feels safe at home [Fully functional (bathing, dressing, toileting, transferring, walking, feeding)] : Fully functional (bathing, dressing, toileting, transferring, walking, feeding) [Fully functional (using the telephone, shopping, preparing meals, housekeeping, doing laundry, using] : Fully functional and needs no help or supervision to perform IADLs (using the telephone, shopping, preparing meals, housekeeping, doing laundry, using transportation, managing medications and managing finances) [Current] : Current [de-identified] : marijuana [[id number]] : 0 [de-identified] : mom, dad, brother  [de-identified] : 1 female partner. uses protection  [de-identified] : smokes 25 cigs/day for the last couple of years

## 2023-08-29 NOTE — COUNSELING
[Smoking Cessation Program Referral] : Smoking Cessation Program Referral  [Yes] : Willing to quit smoking [Cessation strategies including cessation program discussed] : Cessation strategies including cessation program discussed [Encouraged to pick a quit date and identify support needed to quit] : Encouraged to pick a quit date and identify support needed to quit [FreeTextEntry1] : 4

## 2023-08-29 NOTE — HISTORY OF PRESENT ILLNESS
[FreeTextEntry1] : cpe/est care [de-identified] : SIRENA ENG is a 28 year M who presents for CPE/est care with several concerns PMHx none reported   His most concern issue is his RLE pain, swelling, ulcerations. States it is chronic and started approx 1 year ago. Reports it started with a blister on anterior shin that never healed. He has since had swelling, pain and erythema requiring multiple courses of abx without resolution. He was recently admitted at [facility] 7/2023. Started on IV abx which was Dc'ed as etiology was thought to be non-infectious. S/p xray negative for OM, bcx NGTD, LE doppler negative for DVT.   Since DC, he has been seen by: Derm Dr. Ellis- rx'ed clobetasol and mupirocin [facility] wound care Dr. Velázquez- rx'ed a different topical med (does not recall name) Vascular- s/p STARR with Dr. Dorantes- unremarkable.   Has intermittent sharp chest pain for past 1-2 months; occurs once or twice every other day. Pain at rest. Fleeting, self resolves. No pain with exertion. Denies CP, palpitations.   Reports he would like to be evaluated for ADHD. States he has trouble concentrating and completing tasks. Reports he was late to school every day during high school; he needed to go to summer school to graduate. In addition, he started college but was unable to continue.

## 2023-08-29 NOTE — PHYSICAL EXAM
[Coordination Grossly Intact] : coordination grossly intact [No Focal Deficits] : no focal deficits [Normal Affect] : the affect was normal [Normal Insight/Judgement] : insight and judgment were intact [No Acute Distress] : no acute distress [Well-Appearing] : well-appearing [Normal Sclera/Conjunctiva] : normal sclera/conjunctiva [EOMI] : extraocular movements intact [Normal Outer Ear/Nose] : the outer ears and nose were normal in appearance [Normal Oropharynx] : the oropharynx was normal [Normal TMs] : both tympanic membranes were normal [No Lymphadenopathy] : no lymphadenopathy [Supple] : supple [Thyroid Normal, No Nodules] : the thyroid was normal and there were no nodules present [No Respiratory Distress] : no respiratory distress  [No Accessory Muscle Use] : no accessory muscle use [Clear to Auscultation] : lungs were clear to auscultation bilaterally [Normal Rate] : normal rate  [Regular Rhythm] : with a regular rhythm [Normal S1, S2] : normal S1 and S2 [Soft] : abdomen soft [Non Tender] : non-tender [Non-distended] : non-distended [Normal Bowel Sounds] : normal bowel sounds [de-identified] : RLE 2+ non-pitting edema, no calf tenderness [de-identified] : R LE swelling, erythema, healing scabs on anterior shin. Erythematous plaques on lower abdomen, AC fossa b/l, R forearm, R posterior neck

## 2023-08-29 NOTE — REVIEW OF SYSTEMS
[Itching] : itching [Skin Rash] : skin rash [Negative] : Heme/Lymph [Chest Pain] : chest pain [Lower Ext Edema] : lower extremity edema [Palpitations] : no palpitations [de-identified] : RLE swelling

## 2023-08-29 NOTE — ASSESSMENT
[FreeTextEntry1] : HCM -well visit -routine labs ordered; patient states he will get them done at lab as he is afraid of needles and needs to be mentally prepared -depression screen negative -flu vaccine- recommend, defers for now  -covid vaccines- 2 doses  -tdap 1-2 years ago  -advised to get annual eye, dental, and skin exams  RLE swelling -unclear etiology ?venous stasis; chronic for approx 1 year -recently admitted at [facility] 7/2023. Started on IV abx which was Dc'ed as etiology was thought to be non-infectious. S/p xray negative for OM, bcx NGTD, LE doppler negative for DVT -on exam today: R LE swelling, mild erythema, healing scabs on anterior shin. Patient reports swelling/redness is improved compared to baseline. No drainage, open wounds, increased warmth noted -patient non-toxic appearing, no systemic symptoms seen or reported- will keep off abx at this time  -on clobetasol, mupirocin, and additional topical medication (does not recall name, rx'ed by wound care dr) -s/p unremarkable STARR. Advised to make appt with vascular Dr. Tramaine Dorantes -follow up with wound care Dr. Velázquez and derm Dr. Ellis -strict ED precautions given  Rash on abdomen, b/l AC fossa, R forearm, R posterior neck -appears eczematous -recommend daily moisturizer and topical clobetasol prn -make follow up with derm Dr. Ellis  Atypical chest pain -fleeting episodes of sharp CP at rest, no CP with exertion, denies palpitations, JAMESON -ekg- SR -has pending appt with cardio Dr. Brito 9/13  Difficultly concentrating  Substance abuse -per chart review, patient uses marijuana, cocaine, meth. However, patient admits only to smoking marijuana at visit today -psych referral given

## 2023-09-12 ENCOUNTER — APPOINTMENT (OUTPATIENT)
Dept: DERMATOLOGY | Facility: CLINIC | Age: 28
End: 2023-09-12
Payer: COMMERCIAL

## 2023-09-12 DIAGNOSIS — L23.9 ALLERGIC CONTACT DERMATITIS, UNSPECIFIED CAUSE: ICD-10-CM

## 2023-09-12 PROCEDURE — 99214 OFFICE O/P EST MOD 30 MIN: CPT

## 2023-09-12 RX ORDER — CLOBETASOL PROPIONATE 0.5 MG/G
0.05 OINTMENT TOPICAL
Qty: 2 | Refills: 3 | Status: ACTIVE | COMMUNITY
Start: 2023-07-18 | End: 1900-01-01

## 2023-09-13 ENCOUNTER — APPOINTMENT (OUTPATIENT)
Dept: CARDIOLOGY | Facility: CLINIC | Age: 28
End: 2023-09-13
Payer: COMMERCIAL

## 2023-09-13 VITALS
SYSTOLIC BLOOD PRESSURE: 138 MMHG | OXYGEN SATURATION: 99 % | HEART RATE: 91 BPM | DIASTOLIC BLOOD PRESSURE: 83 MMHG | HEIGHT: 71 IN

## 2023-09-13 PROCEDURE — 99205 OFFICE O/P NEW HI 60 MIN: CPT

## 2023-09-18 LAB
25(OH)D3 SERPL-MCNC: 25 NG/ML
ALBUMIN SERPL ELPH-MCNC: 4.9 G/DL
ALP BLD-CCNC: 76 U/L
ALT SERPL-CCNC: 17 U/L
ANION GAP SERPL CALC-SCNC: 12 MMOL/L
AST SERPL-CCNC: 20 U/L
BILIRUB SERPL-MCNC: 0.2 MG/DL
BUN SERPL-MCNC: 19 MG/DL
CALCIUM SERPL-MCNC: 9.8 MG/DL
CHLORIDE SERPL-SCNC: 101 MMOL/L
CHOLEST SERPL-MCNC: 186 MG/DL
CO2 SERPL-SCNC: 25 MMOL/L
CREAT SERPL-MCNC: 1.06 MG/DL
EGFR: 99 ML/MIN/1.73M2
ESTIMATED AVERAGE GLUCOSE: 120 MG/DL
GLUCOSE SERPL-MCNC: 89 MG/DL
HBA1C MFR BLD HPLC: 5.8 %
HCT VFR BLD CALC: 46.6 %
HDLC SERPL-MCNC: 57 MG/DL
HGB BLD-MCNC: 15.2 G/DL
LDLC SERPL CALC-MCNC: 117 MG/DL
MCHC RBC-ENTMCNC: 28.3 PG
MCHC RBC-ENTMCNC: 32.6 GM/DL
MCV RBC AUTO: 86.6 FL
NONHDLC SERPL-MCNC: 129 MG/DL
PLATELET # BLD AUTO: 314 K/UL
POTASSIUM SERPL-SCNC: 4.5 MMOL/L
PROT SERPL-MCNC: 7.4 G/DL
RBC # BLD: 5.38 M/UL
RBC # FLD: 13.2 %
SODIUM SERPL-SCNC: 138 MMOL/L
TRIGL SERPL-MCNC: 66 MG/DL
TSH SERPL-ACNC: 2.07 UIU/ML
WBC # FLD AUTO: 10.05 K/UL

## 2023-09-19 ENCOUNTER — APPOINTMENT (OUTPATIENT)
Dept: ULTRASOUND IMAGING | Facility: CLINIC | Age: 28
End: 2023-09-19

## 2023-09-24 ENCOUNTER — EMERGENCY (EMERGENCY)
Facility: HOSPITAL | Age: 28
LOS: 1 days | Discharge: HOME | End: 2023-09-24
Attending: EMERGENCY MEDICINE | Admitting: EMERGENCY MEDICINE
Payer: COMMERCIAL

## 2023-09-24 VITALS
RESPIRATION RATE: 18 BRPM | TEMPERATURE: 98 F | HEART RATE: 111 BPM | DIASTOLIC BLOOD PRESSURE: 96 MMHG | SYSTOLIC BLOOD PRESSURE: 153 MMHG | OXYGEN SATURATION: 100 %

## 2023-09-24 LAB
ALBUMIN SERPL ELPH-MCNC: 4.6 G/DL — SIGNIFICANT CHANGE UP (ref 3.3–5)
ALP SERPL-CCNC: 74 U/L — SIGNIFICANT CHANGE UP (ref 40–120)
ALT FLD-CCNC: 17 U/L — SIGNIFICANT CHANGE UP (ref 4–41)
ANION GAP SERPL CALC-SCNC: 9 MMOL/L — SIGNIFICANT CHANGE UP (ref 7–14)
APAP SERPL-MCNC: <10 UG/ML — LOW (ref 15–25)
AST SERPL-CCNC: 16 U/L — SIGNIFICANT CHANGE UP (ref 4–40)
BASOPHILS # BLD AUTO: 0.03 K/UL — SIGNIFICANT CHANGE UP (ref 0–0.2)
BASOPHILS NFR BLD AUTO: 0.3 % — SIGNIFICANT CHANGE UP (ref 0–2)
BILIRUB SERPL-MCNC: 0.2 MG/DL — SIGNIFICANT CHANGE UP (ref 0.2–1.2)
BUN SERPL-MCNC: 16 MG/DL — SIGNIFICANT CHANGE UP (ref 7–23)
CALCIUM SERPL-MCNC: 9.8 MG/DL — SIGNIFICANT CHANGE UP (ref 8.4–10.5)
CHLORIDE SERPL-SCNC: 104 MMOL/L — SIGNIFICANT CHANGE UP (ref 98–107)
CO2 SERPL-SCNC: 29 MMOL/L — SIGNIFICANT CHANGE UP (ref 22–31)
CREAT SERPL-MCNC: 1.02 MG/DL — SIGNIFICANT CHANGE UP (ref 0.5–1.3)
EGFR: 103 ML/MIN/1.73M2 — SIGNIFICANT CHANGE UP
EOSINOPHIL # BLD AUTO: 0.16 K/UL — SIGNIFICANT CHANGE UP (ref 0–0.5)
EOSINOPHIL NFR BLD AUTO: 1.8 % — SIGNIFICANT CHANGE UP (ref 0–6)
ETHANOL SERPL-MCNC: <10 MG/DL — SIGNIFICANT CHANGE UP
GLUCOSE SERPL-MCNC: 94 MG/DL — SIGNIFICANT CHANGE UP (ref 70–99)
HCT VFR BLD CALC: 47.9 % — SIGNIFICANT CHANGE UP (ref 39–50)
HGB BLD-MCNC: 15.4 G/DL — SIGNIFICANT CHANGE UP (ref 13–17)
IANC: 5.41 K/UL — SIGNIFICANT CHANGE UP (ref 1.8–7.4)
IMM GRANULOCYTES NFR BLD AUTO: 0.2 % — SIGNIFICANT CHANGE UP (ref 0–0.9)
LYMPHOCYTES # BLD AUTO: 2.56 K/UL — SIGNIFICANT CHANGE UP (ref 1–3.3)
LYMPHOCYTES # BLD AUTO: 29.5 % — SIGNIFICANT CHANGE UP (ref 13–44)
MCHC RBC-ENTMCNC: 28.1 PG — SIGNIFICANT CHANGE UP (ref 27–34)
MCHC RBC-ENTMCNC: 32.2 GM/DL — SIGNIFICANT CHANGE UP (ref 32–36)
MCV RBC AUTO: 87.4 FL — SIGNIFICANT CHANGE UP (ref 80–100)
MONOCYTES # BLD AUTO: 0.49 K/UL — SIGNIFICANT CHANGE UP (ref 0–0.9)
MONOCYTES NFR BLD AUTO: 5.7 % — SIGNIFICANT CHANGE UP (ref 2–14)
NEUTROPHILS # BLD AUTO: 5.41 K/UL — SIGNIFICANT CHANGE UP (ref 1.8–7.4)
NEUTROPHILS NFR BLD AUTO: 62.5 % — SIGNIFICANT CHANGE UP (ref 43–77)
NRBC # BLD: 0 /100 WBCS — SIGNIFICANT CHANGE UP (ref 0–0)
NRBC # FLD: 0 K/UL — SIGNIFICANT CHANGE UP (ref 0–0)
NT-PROBNP SERPL-SCNC: <36 PG/ML — SIGNIFICANT CHANGE UP
PLATELET # BLD AUTO: 352 K/UL — SIGNIFICANT CHANGE UP (ref 150–400)
POTASSIUM SERPL-MCNC: 3.6 MMOL/L — SIGNIFICANT CHANGE UP (ref 3.5–5.3)
POTASSIUM SERPL-SCNC: 3.6 MMOL/L — SIGNIFICANT CHANGE UP (ref 3.5–5.3)
PROT SERPL-MCNC: 7.6 G/DL — SIGNIFICANT CHANGE UP (ref 6–8.3)
RBC # BLD: 5.48 M/UL — SIGNIFICANT CHANGE UP (ref 4.2–5.8)
RBC # FLD: 13.2 % — SIGNIFICANT CHANGE UP (ref 10.3–14.5)
SALICYLATES SERPL-MCNC: <0.3 MG/DL — LOW (ref 15–30)
SODIUM SERPL-SCNC: 142 MMOL/L — SIGNIFICANT CHANGE UP (ref 135–145)
TOXICOLOGY SCREEN, DRUGS OF ABUSE, SERUM RESULT: SIGNIFICANT CHANGE UP
TROPONIN T, HIGH SENSITIVITY RESULT: 7 NG/L — SIGNIFICANT CHANGE UP
WBC # BLD: 8.67 K/UL — SIGNIFICANT CHANGE UP (ref 3.8–10.5)
WBC # FLD AUTO: 8.67 K/UL — SIGNIFICANT CHANGE UP (ref 3.8–10.5)

## 2023-09-24 PROCEDURE — 71045 X-RAY EXAM CHEST 1 VIEW: CPT | Mod: 26

## 2023-09-24 PROCEDURE — 93010 ELECTROCARDIOGRAM REPORT: CPT

## 2023-09-24 PROCEDURE — 93971 EXTREMITY STUDY: CPT | Mod: 26,LT

## 2023-09-24 PROCEDURE — 73590 X-RAY EXAM OF LOWER LEG: CPT | Mod: 26,RT

## 2023-09-24 PROCEDURE — 99284 EMERGENCY DEPT VISIT MOD MDM: CPT

## 2023-09-24 RX ORDER — SODIUM CHLORIDE 9 MG/ML
1000 INJECTION INTRAMUSCULAR; INTRAVENOUS; SUBCUTANEOUS ONCE
Refills: 0 | Status: COMPLETED | OUTPATIENT
Start: 2023-09-24 | End: 2023-09-24

## 2023-09-24 RX ADMIN — SODIUM CHLORIDE 1000 MILLILITER(S): 9 INJECTION INTRAMUSCULAR; INTRAVENOUS; SUBCUTANEOUS at 21:40

## 2023-09-24 NOTE — ED ADULT TRIAGE NOTE - CHIEF COMPLAINT QUOTE
pt c/o right calf swelling x7 months, wish to be evaluated today. denies pain, long trips. upon inquiry of tachycardia pt adds that he used cocaine 15 minutes ago. also endorses Xanax use 1 hour ago. denies PMHx.

## 2023-09-24 NOTE — ED PROVIDER NOTE - PATIENT PORTAL LINK FT
You can access the FollowMyHealth Patient Portal offered by [facility] by registering at the following website: [email or web address]. By joining [email or web address]’s FollowMyHealth portal, you will also be able to view your health information using other applications (apps) compatible with our system.

## 2023-09-24 NOTE — ED PROVIDER NOTE - NSFOLLOWUPINSTRUCTIONS_ED_ALL_ED_FT
Today you were seen in the ED for right leg pain     It was found that you have No signs of medical emergency warranting further evaluation in the emergency department.    A copy of your results attached this document below.    Please follow up with Your primary care provider in regards to today's event.    Please return to the ED if you have any of the following:   You develop severe pain not controlled with at home medication. Today you were seen in the ED for right leg pain     It was found that you have No signs of medical emergency warranting further evaluation in the emergency department.    A copy of your results attached this document below.    Please follow up with your vascular doctor in regards to today's events.   If you do not have on information for one can be found below.     You can take Tylenol and Motrin every 8 hours for pain as needed.     Please follow up with Your primary care provider in regards to today's event.    Please return to the ED if you have any of the following:   You develop severe pain not controlled with at home medication. Lose pulses, feeling, control of your leg.

## 2023-09-24 NOTE — ED PROVIDER NOTE - ATTENDING CONTRIBUTION TO CARE
Attending Statement: I have personally seen and examined this patient. I have fully participated in the care of this patient. I have reviewed all pertinent clinical information, including history physical exam, plan and the Resident's note and agree except as noted  28-year-old male from home chief complaint of right leg pain for months.  No trauma.  Denies any intravenous drug use.  No skin popping or history.  No chest pain or shortness of breath.  Patient states he was admitted here in the hospital signed out AGAINST MEDICAL ADVICE.  Patient is very anxious and states that he has used cocaine, ketamine, marijuana, Xanax, methamphetamine 30 minutes prior to arrival.  Very anxious appearing tachycardic but not tachypneic not hypoxic.  Anxious appearing male not toxic.  No work of breathing.  Nontender abdomen.  Right lower extremity foot to just below the knee is hyperpigmented tender with no discharge.  No calf tenderness.  Palpable pulse.  Normal sensation normal strength.  Plan EKG, cardiac monitor, IV fluids, labs, x-ray of the right foot, ultrasound of the right leg and reassess

## 2023-09-24 NOTE — ED PROVIDER NOTE - CLINICAL SUMMARY MEDICAL DECISION MAKING FREE TEXT BOX
Given history and physical clinical concern for other etiology given patient has multiple drugs on board such as amphetamine ketamine possible benzos cocaine and patient is tachycardic.  Will assess for DVT study x-rays for free air

## 2023-09-24 NOTE — ED PROVIDER NOTE - OBJECTIVE STATEMENT
28-year-old male with no pertinent medical past history chief complaint of right-sided leg swelling going on for the past year.  Patient also endorses provider that he approximately 30 minutes prior to arrival to the ED used cocaine by snorting ketamine benzos because he was nervous to come to the hospital.  Patient states he has had multiple visits to the ED for his right lower extremity but always leaves AGAINST MEDICAL ADVICE because he does not like hospitals.  Patient wishing today to get further checked out.  Patient describes his right lower extremity as swollen otherwise no drastic change from the previous year no chest pain or shortness of breath.

## 2023-09-24 NOTE — ED ADULT NURSE NOTE - NSFALLUNIVINTERV_ED_ALL_ED
Bed/Stretcher in lowest position, wheels locked, appropriate side rails in place/Call bell, personal items and telephone in reach/Instruct patient to call for assistance before getting out of bed/chair/stretcher/Non-slip footwear applied when patient is off stretcher/[place] to call system/Physically safe environment - no spills, clutter or unnecessary equipment/Purposeful proactive rounding/Room/bathroom lighting operational, light cord in reach

## 2023-09-24 NOTE — ED PROVIDER NOTE - NSFOLLOWUPCLINICS_GEN_ALL_ED_FT
[facility]  General Surgery  [address] [place] [address] - 3rd [place] [address]  Phone: [phone]  Fax:   Follow Up Time: 1-3 Days

## 2023-09-24 NOTE — ED ADULT NURSE NOTE - OBJECTIVE STATEMENT
Pt arrives to the ED aaox4, ambulatory, breathing even and unlabored in bed. Pt came with c/o right leg swelling. Right calf noted to be swollen and having skin discoloration. Pt denies any trauma or injury to leg, pt states he has had this discoloration for a few days and is unsure how it happened. Pt appears extremely anxious in room. Pt states he does not like hospitals or needles and is anxious at this time. Pt endorsed to MD that he snorted ketamine before coming to ED because he was nervous. Pt denies chest pain, SOB, dizziness, headache, blurry vision, chills. Bed in lowest position, call bell within reach, safety precautions in place. #20G IV placed in left AC, labs drawn and sent.

## 2023-09-24 NOTE — ED ADULT NURSE NOTE - CAS DISCH CONDITION
5/6/2019 Lauren Meth [address] [place] [address] [id number] Dear Ms. Michael Stratton, You were recently seen in the Emergency Department of [facility] and had blood work or x-rays performed. We would like to discuss these with you. Please call the Emergency Department at your earliest convenience. If you were seen at [address] [place] [address], please dial [phone], and if you were seen at [facility], please call [phone] to speak with one of our providers. Sincerely, Sd Lara MD 
Medical Director Emergency Services, [address] [place] [address]  
[phone]/ [phone] Stable

## 2023-09-24 NOTE — ED PROVIDER NOTE - PHYSICAL EXAMINATION
GENERAL: Awake, alert, NAD  LUNGS: CTAB, no wheezes or crackles   CARDIAC: RRR, no m/r/g  ABDOMEN: Mild erythema overlying the lower abdomen no open drainage nontender otherwise  BACK: No midline spinal tenderness, no CVA tenderness  EXT: Right lower extremity warm nontender mildly swollen with no pitting edema calf is slightly to moderately enlarged Neuro vas intact otherwise  NEURO: A&Ox3. Moving all extremities.  SKIN: Warm and dry. No rash.  PSYCH: Normal affect.

## 2023-09-25 VITALS
HEART RATE: 87 BPM | RESPIRATION RATE: 18 BRPM | SYSTOLIC BLOOD PRESSURE: 137 MMHG | DIASTOLIC BLOOD PRESSURE: 92 MMHG | TEMPERATURE: 98 F | OXYGEN SATURATION: 100 %

## 2023-09-25 PROCEDURE — 73620 X-RAY EXAM OF FOOT: CPT | Mod: 26,RT

## 2023-09-25 NOTE — ED ADULT NURSE REASSESSMENT NOTE - NS ED NURSE REASSESS COMMENT FT1
Pt at baseline mental status, breathing even and unlabored in bed. Pt reporting a toothache and requesting pain medicine. MD Lane made aware. Pt denies chest pain, SOB, dizziness, headache, blurry vision, chills. Bed in lowest position, call bell within reach. Pending XR and US results.

## 2023-10-04 ENCOUNTER — APPOINTMENT (OUTPATIENT)
Dept: DERMATOLOGY | Facility: CLINIC | Age: 28
End: 2023-10-04

## 2023-10-06 ENCOUNTER — APPOINTMENT (OUTPATIENT)
Dept: DERMATOLOGY | Facility: CLINIC | Age: 28
End: 2023-10-06

## 2023-10-07 ENCOUNTER — EMERGENCY (EMERGENCY)
Facility: HOSPITAL | Age: 28
LOS: 1 days | Discharge: HOME | End: 2023-10-07
Attending: EMERGENCY MEDICINE | Admitting: EMERGENCY MEDICINE
Payer: MEDICAID

## 2023-10-07 ENCOUNTER — EMERGENCY (EMERGENCY)
Facility: HOSPITAL | Age: 28
LOS: 1 days | Discharge: HOME | End: 2023-10-07
Attending: EMERGENCY MEDICINE
Payer: SELF-PAY

## 2023-10-07 VITALS
DIASTOLIC BLOOD PRESSURE: 73 MMHG | HEART RATE: 88 BPM | RESPIRATION RATE: 16 BRPM | OXYGEN SATURATION: 100 % | SYSTOLIC BLOOD PRESSURE: 124 MMHG | TEMPERATURE: 98 F

## 2023-10-07 VITALS
WEIGHT: 199.96 LBS | HEIGHT: 71 IN | OXYGEN SATURATION: 98 % | DIASTOLIC BLOOD PRESSURE: 77 MMHG | RESPIRATION RATE: 18 BRPM | TEMPERATURE: 98 F | SYSTOLIC BLOOD PRESSURE: 121 MMHG | HEART RATE: 79 BPM

## 2023-10-07 VITALS
OXYGEN SATURATION: 100 % | SYSTOLIC BLOOD PRESSURE: 137 MMHG | TEMPERATURE: 98 F | HEART RATE: 72 BPM | RESPIRATION RATE: 16 BRPM | DIASTOLIC BLOOD PRESSURE: 94 MMHG

## 2023-10-07 LAB
ALBUMIN SERPL ELPH-MCNC: 4.6 G/DL — SIGNIFICANT CHANGE UP (ref 3.3–5)
ALP SERPL-CCNC: 68 U/L — SIGNIFICANT CHANGE UP (ref 40–120)
ALT FLD-CCNC: 16 U/L — SIGNIFICANT CHANGE UP (ref 10–45)
ANION GAP SERPL CALC-SCNC: 12 MMOL/L — SIGNIFICANT CHANGE UP (ref 5–17)
APTT BLD: 26.2 SEC — SIGNIFICANT CHANGE UP (ref 24.5–35.6)
AST SERPL-CCNC: 17 U/L — SIGNIFICANT CHANGE UP (ref 10–40)
BASOPHILS # BLD AUTO: 0 K/UL — SIGNIFICANT CHANGE UP (ref 0–0.2)
BASOPHILS NFR BLD AUTO: 0 % — SIGNIFICANT CHANGE UP (ref 0–2)
BILIRUB SERPL-MCNC: 0.3 MG/DL — SIGNIFICANT CHANGE UP (ref 0.2–1.2)
BUN SERPL-MCNC: 24 MG/DL — HIGH (ref 7–23)
CALCIUM SERPL-MCNC: 9.3 MG/DL — SIGNIFICANT CHANGE UP (ref 8.4–10.5)
CHLORIDE SERPL-SCNC: 101 MMOL/L — SIGNIFICANT CHANGE UP (ref 96–108)
CO2 SERPL-SCNC: 24 MMOL/L — SIGNIFICANT CHANGE UP (ref 22–31)
CREAT SERPL-MCNC: 1.04 MG/DL — SIGNIFICANT CHANGE UP (ref 0.5–1.3)
DACRYOCYTES BLD QL SMEAR: SLIGHT — SIGNIFICANT CHANGE UP
EGFR: 101 ML/MIN/1.73M2 — SIGNIFICANT CHANGE UP
EOSINOPHIL # BLD AUTO: 0.19 K/UL — SIGNIFICANT CHANGE UP (ref 0–0.5)
EOSINOPHIL NFR BLD AUTO: 1.7 % — SIGNIFICANT CHANGE UP (ref 0–6)
GLUCOSE SERPL-MCNC: 110 MG/DL — HIGH (ref 70–99)
HCT VFR BLD CALC: 44.3 % — SIGNIFICANT CHANGE UP (ref 39–50)
HGB BLD-MCNC: 14.2 G/DL — SIGNIFICANT CHANGE UP (ref 13–17)
INR BLD: 0.96 RATIO — SIGNIFICANT CHANGE UP (ref 0.85–1.18)
LYMPHOCYTES # BLD AUTO: 48.7 % — HIGH (ref 13–44)
LYMPHOCYTES # BLD AUTO: 5.43 K/UL — HIGH (ref 1–3.3)
MANUAL SMEAR VERIFICATION: SIGNIFICANT CHANGE UP
MCHC RBC-ENTMCNC: 28.1 PG — SIGNIFICANT CHANGE UP (ref 27–34)
MCHC RBC-ENTMCNC: 32.1 GM/DL — SIGNIFICANT CHANGE UP (ref 32–36)
MCV RBC AUTO: 87.7 FL — SIGNIFICANT CHANGE UP (ref 80–100)
MONOCYTES # BLD AUTO: 0.19 K/UL — SIGNIFICANT CHANGE UP (ref 0–0.9)
MONOCYTES NFR BLD AUTO: 1.7 % — LOW (ref 2–14)
NEUTROPHILS # BLD AUTO: 4.85 K/UL — SIGNIFICANT CHANGE UP (ref 1.8–7.4)
NEUTROPHILS NFR BLD AUTO: 43.5 % — SIGNIFICANT CHANGE UP (ref 43–77)
PLAT MORPH BLD: NORMAL — SIGNIFICANT CHANGE UP
PLATELET # BLD AUTO: 359 K/UL — SIGNIFICANT CHANGE UP (ref 150–400)
POIKILOCYTOSIS BLD QL AUTO: SLIGHT — SIGNIFICANT CHANGE UP
POLYCHROMASIA BLD QL SMEAR: SLIGHT — SIGNIFICANT CHANGE UP
POTASSIUM SERPL-MCNC: 3.7 MMOL/L — SIGNIFICANT CHANGE UP (ref 3.5–5.3)
POTASSIUM SERPL-SCNC: 3.7 MMOL/L — SIGNIFICANT CHANGE UP (ref 3.5–5.3)
PROT SERPL-MCNC: 7.3 G/DL — SIGNIFICANT CHANGE UP (ref 6–8.3)
PROTHROM AB SERPL-ACNC: 10.6 SEC — SIGNIFICANT CHANGE UP (ref 9.5–13)
RBC # BLD: 5.05 M/UL — SIGNIFICANT CHANGE UP (ref 4.2–5.8)
RBC # FLD: 13.3 % — SIGNIFICANT CHANGE UP (ref 10.3–14.5)
RBC BLD AUTO: ABNORMAL
SODIUM SERPL-SCNC: 137 MMOL/L — SIGNIFICANT CHANGE UP (ref 135–145)
TARGETS BLD QL SMEAR: SLIGHT — SIGNIFICANT CHANGE UP
VARIANT LYMPHS # BLD: 4.4 % — SIGNIFICANT CHANGE UP (ref 0–6)
WBC # BLD: 11.16 K/UL — HIGH (ref 3.8–10.5)
WBC # FLD AUTO: 11.16 K/UL — HIGH (ref 3.8–10.5)

## 2023-10-07 PROCEDURE — 86850 RBC ANTIBODY SCREEN: CPT

## 2023-10-07 PROCEDURE — 73610 X-RAY EXAM OF ANKLE: CPT | Mod: 26,RT

## 2023-10-07 PROCEDURE — 86901 BLOOD TYPING SEROLOGIC RH(D): CPT

## 2023-10-07 PROCEDURE — 85610 PROTHROMBIN TIME: CPT

## 2023-10-07 PROCEDURE — 80053 COMPREHEN METABOLIC PANEL: CPT

## 2023-10-07 PROCEDURE — 82550 ASSAY OF CK (CPK): CPT

## 2023-10-07 PROCEDURE — 73590 X-RAY EXAM OF LOWER LEG: CPT

## 2023-10-07 PROCEDURE — 85025 COMPLETE CBC W/AUTO DIFF WBC: CPT

## 2023-10-07 PROCEDURE — 86900 BLOOD TYPING SEROLOGIC ABO: CPT

## 2023-10-07 PROCEDURE — 73562 X-RAY EXAM OF KNEE 3: CPT | Mod: 26,RT

## 2023-10-07 PROCEDURE — 99285 EMERGENCY DEPT VISIT HI MDM: CPT | Mod: 25

## 2023-10-07 PROCEDURE — 96374 THER/PROPH/DIAG INJ IV PUSH: CPT

## 2023-10-07 PROCEDURE — 85730 THROMBOPLASTIN TIME PARTIAL: CPT

## 2023-10-07 PROCEDURE — 36415 COLL VENOUS BLD VENIPUNCTURE: CPT

## 2023-10-07 PROCEDURE — 73610 X-RAY EXAM OF ANKLE: CPT

## 2023-10-07 PROCEDURE — 73562 X-RAY EXAM OF KNEE 3: CPT

## 2023-10-07 PROCEDURE — 99285 EMERGENCY DEPT VISIT HI MDM: CPT

## 2023-10-07 PROCEDURE — 93005 ELECTROCARDIOGRAM TRACING: CPT

## 2023-10-07 PROCEDURE — 99283 EMERGENCY DEPT VISIT LOW MDM: CPT

## 2023-10-07 PROCEDURE — 73590 X-RAY EXAM OF LOWER LEG: CPT | Mod: 26,RT

## 2023-10-07 RX ORDER — ACETAMINOPHEN 500 MG
975 TABLET ORAL ONCE
Refills: 0 | Status: COMPLETED | OUTPATIENT
Start: 2023-10-07 | End: 2023-10-07

## 2023-10-07 RX ORDER — KETOROLAC TROMETHAMINE 30 MG/ML
15 SYRINGE (ML) INJECTION ONCE
Refills: 0 | Status: DISCONTINUED | OUTPATIENT
Start: 2023-10-07 | End: 2023-10-07

## 2023-10-07 RX ADMIN — Medication 15 MILLIGRAM(S): at 10:45

## 2023-10-07 RX ADMIN — Medication 975 MILLIGRAM(S): at 10:04
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
## 2023-10-07 NOTE — ED ADULT TRIAGE NOTE - NS ED NURSE AMBULANCES
[facility]

## 2023-10-07 NOTE — ED PROVIDER NOTE - NSFOLLOWUPINSTRUCTIONS_ED_ALL_ED_FT
-Please take Tylenol up to 650 mg every 6 hours as needed for pain and/or Motrin up to 600 mg every 8 hours as needed for pain    -Please take any prescribed medications as instructed by your PMD    - Be sure to return to the ED if you develop new or worsening symptoms. Specific signs and symptoms to be vigilant of: fever or chills, chest pain, difficulty breathing, palpitations, loss of consciousness, headache, vision changes, slurred speech, difficulty swallowing or drooling, facial droop, weakness in the arms or legs, numbness or tingling, abdominal pain, nausea or vomiting, diarrhea, constipation, blood in the stool or urine, pain on urination, difficulty urinating.

## 2023-10-07 NOTE — ED PROVIDER NOTE - CLINICAL SUMMARY MEDICAL DECISION MAKING FREE TEXT BOX
Reviewed xrays/us/labs from earlier today. pt ambulatory no bleeding now, removed old dressing, re applied a new tefla and bandage. Strict return instructions given. pt going home w friend Reviewed xrays/us/labs from earlier today. pt ambulatory no bleeding now, removed old dressing, re applied a new tefla and bandage. Strict return instructions given. pt going home w friend  pt given extra guaze and tefla to go home.

## 2023-10-07 NOTE — ED ADULT TRIAGE NOTE - MODE OF ARRIVAL
[de-identified] : Walk in [employer]

## 2023-10-07 NOTE — ED PROVIDER NOTE - PROGRESS NOTE DETAILS
Jacoby MORALES PGY3: Labs and imaging negative for emergent pathology at this time. Pt sx improved. safe for dc with pcp f/u and strict return precautions.
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
## 2023-10-07 NOTE — ED PROVIDER NOTE - PATIENT PORTAL LINK FT
You can access the FollowMyHealth Patient Portal offered by [facility] by registering at the following website: [email or web address]. By joining [employer]’s FollowMyHealth portal, you will also be able to view your health information using other applications (apps) compatible with our system. You can access the FollowMyHealth Patient Portal offered by [facility] by registering at the following website: [email or web address]. By joining [employer]’s FollowMyHealth portal, you will also be able to view your health information using other applications (apps) compatible with our system. You can access the FollowMyHealth Patient Portal offered by [facility] by registering at the following website: [email or web address]. By joining [employer]s FollowMyHealth portal, you will also be able to view your health information using other applications (apps) compatible with our system.
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
## 2023-10-07 NOTE — ED PROVIDER NOTE - PHYSICAL EXAMINATION
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 no sign of head/facial trauma  talking in full sentences  able to walk, bear weight. has crutches in the room.  +right leg ecchymosis and bruise noted w bandage w dried blood. no active bleeding noted.

## 2023-10-07 NOTE — ED PROVIDER NOTE - NSFOLLOWUPINSTRUCTIONS_ED_ALL_ED_FT
Abrasion  WHAT YOU NEED TO KNOW:  An abrasion is a scrape on your skin. It happens when your skin rubs against a rough surface. Some examples of an abrasion include rug burn, a skinned elbow, or road rash. Abrasions can be many shapes and sizes. The wound may hurt, bleed, bruise, or swell.   DISCHARGE INSTRUCTIONS:  Return to the emergency department if:   •The bleeding does not stop after 10 minutes of firm pressure.  •You cannot rinse one or more foreign objects out of your wound.  •You have red streaks on your skin coming from your wound.  Contact your healthcare provider if:   •You have a fever or chills.   •Your abrasion is red, warm, swollen, or draining pus.  •You have questions or concerns about your condition or care.  Care for your abrasion:   •Wash your hands and dry them with a clean towel.  •Press a clean cloth against your wound to stop any bleeding.  •Rinse your wound with a lot of clean water. Do not use harsh soap, alcohol, or iodine solutions.  •Use a clean, wet cloth to remove any objects, such as small pieces of rocks or dirt.  •Rub antibiotic ointment on your wound. This may help prevent infection and help your wound heal.  •Cover the wound with a non-stick bandage. Change the bandage daily, and if gets wet or dirty.   Follow up with your healthcare provider as directed: Write down your questions so you remember to ask them during your visits.     Abrasión  LO QUE NECESITA SABER:  Jacquelin abrasión es jacquelin raspadura en la piel. Malott sucede cuando la piel roza contra jacquelin superficie áspera. Algunos ejemplos de jacquelin abrasión incluyen jacquelin quemadura con la alfombra, un codo despellejado o jacquelin rascadas. Las abrasiones pueden ser de varias formas y tamaños. La herida podría doler, sangrar, volverse un moretón o inflamarse.  INSTRUCCIONES SOBRE EL RADHA HOSPITALARIA:  Regrese a la ramona de emergencias si:  •El sangrado no se detiene después de 10 minutos de presionar firmemente.  •Usted no puede enjuagar ajay o más objetos extraños fuera de coto herida.  •En la piel de coto herida le salen unas uri wyman.  Comuníquese con coto médico si:  •Usted tiene fiebre o escalofríos.  •Coto abrasión está enrojecida, cálida, inflamada o drenando pus.  •Usted tiene preguntas o inquietudes acerca de coto condición o cuidado.  Cuidados para coto abrasión:  •Lávese las noman y séquelas con jacquelin toalla limpia.  •Presione un paño limpio contra coto herida para detener el sangrado.  •Enjuague coto herida con mucha agua limpia. No use un jabón muy lexi, alcohol o soluciones de yodo.  •Use un paño limpio y mojado para quitar cualquier objeto, azam pedazos pequeños de piedras o polvo.  •Frote ungüento de antibiótico en coto herida. [place]ott podría ayudar a evitar jacquelin infección y ayudar a que coto herida sane.  •Cubra la herida con un vendaje antiadherente. Cambie el vendaje diariamente, y también si se moja o se ensucia.  Acuda a carlos consultas de control con coto médico según le indicaron.Anote carlos preguntas para que se acuerde de hacerlas dario carlos visitas.
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
## 2023-10-07 NOTE — ED PROVIDER NOTE - PATIENT PORTAL LINK FT
You can access the FollowMyHealth Patient Portal offered by [facility] by registering at the following website: [email or web address]. By joining [employer]’s FollowMyHealth portal, you will also be able to view your health information using other applications (apps) compatible with our system.

## 2023-10-07 NOTE — ED ADULT NURSE NOTE - NSFALLRISKINTERV_ED_ALL_ED
Monitor gait and stability/Monitor for mental status changes and reorient to person, place, and time, as needed/Reinforce activity limits and safety measures with patient and family/Bed in lowest position, wheels locked, appropriate side rails in place/Physically safe environment - no spills, clutter or unnecessary equipment/Purposeful Proactive Rounding

## 2023-10-09 ENCOUNTER — APPOINTMENT (OUTPATIENT)
Dept: DERMATOLOGY | Facility: CLINIC | Age: 28
End: 2023-10-09

## 2023-10-16 ENCOUNTER — OUTPATIENT (OUTPATIENT)
Dept: OUTPATIENT SERVICES | Facility: HOSPITAL | Age: 28
LOS: 1 days | Discharge: STILL ADMITTED OR ADMINISTRATIVE | End: 2023-10-16
Payer: MEDICARE

## 2023-10-25 ENCOUNTER — APPOINTMENT (OUTPATIENT)
Dept: DERMATOLOGY | Facility: CLINIC | Age: 28
End: 2023-10-25

## 2023-10-26 NOTE — PATIENT PROFILE ADULT - FALL HARM RISK - HARM RISK INTERVENTIONS
No
Assistance OOB with selected safe patient handling equipment/Communicate Risk of Fall with Harm to all staff/Discuss with provider need for PT consult/Monitor gait and stability/Provide patient with walking aids - walker, cane, crutches/Reinforce activity limits and safety measures with patient and family/Tailored Fall Risk Interventions/Visual Cue: Yellow wristband and red socks/Bed in lowest position, wheels locked, appropriate side rails in place/Call bell, personal items and telephone in reach/Instruct patient to call for assistance before getting out of bed or chair/Non-slip footwear when patient is out of bed/[place] to call system/Physically safe environment - no spills, clutter or unnecessary equipment/Purposeful Proactive Rounding/Room/bathroom lighting operational, light cord in reach

## 2023-10-27 ENCOUNTER — APPOINTMENT (OUTPATIENT)
Dept: DERMATOLOGY | Facility: CLINIC | Age: 28
End: 2023-10-27

## 2023-10-27 NOTE — ED PROVIDER NOTE - TOBACCO USE
Lora Corona  [facility]  Miriam Cross  Scheduled Appointment: 12/15/2023    
Unknown if ever smoked

## 2023-10-30 ENCOUNTER — APPOINTMENT (OUTPATIENT)
Dept: DERMATOLOGY | Facility: CLINIC | Age: 28
End: 2023-10-30

## 2023-11-03 ENCOUNTER — NON-APPOINTMENT (OUTPATIENT)
Age: 28
End: 2023-11-03

## 2023-11-07 ENCOUNTER — INPATIENT (INPATIENT)
Facility: HOSPITAL | Age: 28
LOS: 3 days | Discharge: HOME HEALTH | DRG: 300 | End: 2023-11-11
Attending: STUDENT IN AN ORGANIZED HEALTH CARE EDUCATION/TRAINING PROGRAM | Admitting: STUDENT IN AN ORGANIZED HEALTH CARE EDUCATION/TRAINING PROGRAM
Payer: MEDICAID

## 2023-11-07 VITALS
TEMPERATURE: 98 F | RESPIRATION RATE: 18 BRPM | DIASTOLIC BLOOD PRESSURE: 102 MMHG | SYSTOLIC BLOOD PRESSURE: 163 MMHG | HEART RATE: 86 BPM | HEIGHT: 71 IN | OXYGEN SATURATION: 98 % | WEIGHT: 199.96 LBS

## 2023-11-07 DIAGNOSIS — Z29.9 ENCOUNTER FOR PROPHYLACTIC MEASURES, UNSPECIFIED: ICD-10-CM

## 2023-11-07 DIAGNOSIS — S81.801A UNSPECIFIED OPEN WOUND, RIGHT LOWER LEG, INITIAL ENCOUNTER: ICD-10-CM

## 2023-11-07 DIAGNOSIS — L03.90 CELLULITIS, UNSPECIFIED: ICD-10-CM

## 2023-11-07 LAB
ALBUMIN SERPL ELPH-MCNC: 4.4 G/DL — SIGNIFICANT CHANGE UP (ref 3.3–5)
ALP SERPL-CCNC: 88 U/L — SIGNIFICANT CHANGE UP (ref 40–120)
ALT FLD-CCNC: 16 U/L — SIGNIFICANT CHANGE UP (ref 10–45)
ANION GAP SERPL CALC-SCNC: 12 MMOL/L — SIGNIFICANT CHANGE UP (ref 5–17)
ANION GAP SERPL CALC-SCNC: 18 MMOL/L — HIGH (ref 5–17)
AST SERPL-CCNC: 17 U/L — SIGNIFICANT CHANGE UP (ref 10–40)
BASOPHILS # BLD AUTO: 0.04 K/UL — SIGNIFICANT CHANGE UP (ref 0–0.2)
BASOPHILS NFR BLD AUTO: 0.4 % — SIGNIFICANT CHANGE UP (ref 0–2)
BILIRUB SERPL-MCNC: 0.1 MG/DL — LOW (ref 0.2–1.2)
BUN SERPL-MCNC: 12 MG/DL — SIGNIFICANT CHANGE UP (ref 7–23)
BUN SERPL-MCNC: 17 MG/DL — SIGNIFICANT CHANGE UP (ref 7–23)
CALCIUM SERPL-MCNC: 9.3 MG/DL — SIGNIFICANT CHANGE UP (ref 8.4–10.5)
CALCIUM SERPL-MCNC: 9.5 MG/DL — SIGNIFICANT CHANGE UP (ref 8.4–10.5)
CHLORIDE SERPL-SCNC: 101 MMOL/L — SIGNIFICANT CHANGE UP (ref 96–108)
CHLORIDE SERPL-SCNC: 102 MMOL/L — SIGNIFICANT CHANGE UP (ref 96–108)
CO2 SERPL-SCNC: 18 MMOL/L — LOW (ref 22–31)
CO2 SERPL-SCNC: 23 MMOL/L — SIGNIFICANT CHANGE UP (ref 22–31)
CREAT SERPL-MCNC: 0.81 MG/DL — SIGNIFICANT CHANGE UP (ref 0.5–1.3)
CREAT SERPL-MCNC: 0.92 MG/DL — SIGNIFICANT CHANGE UP (ref 0.5–1.3)
CRP SERPL-MCNC: <3 MG/L — SIGNIFICANT CHANGE UP (ref 0–4)
EGFR: 117 ML/MIN/1.73M2 — SIGNIFICANT CHANGE UP
EGFR: 124 ML/MIN/1.73M2 — SIGNIFICANT CHANGE UP
EOSINOPHIL # BLD AUTO: 0.45 K/UL — SIGNIFICANT CHANGE UP (ref 0–0.5)
EOSINOPHIL NFR BLD AUTO: 4.3 % — SIGNIFICANT CHANGE UP (ref 0–6)
GLUCOSE SERPL-MCNC: 95 MG/DL — SIGNIFICANT CHANGE UP (ref 70–99)
GLUCOSE SERPL-MCNC: 96 MG/DL — SIGNIFICANT CHANGE UP (ref 70–99)
HCT VFR BLD CALC: 43.8 % — SIGNIFICANT CHANGE UP (ref 39–50)
HGB BLD-MCNC: 13.7 G/DL — SIGNIFICANT CHANGE UP (ref 13–17)
IMM GRANULOCYTES NFR BLD AUTO: 0.4 % — SIGNIFICANT CHANGE UP (ref 0–0.9)
LACTATE SERPL-SCNC: 1.1 MMOL/L — SIGNIFICANT CHANGE UP (ref 0.5–2)
LYMPHOCYTES # BLD AUTO: 3.76 K/UL — HIGH (ref 1–3.3)
LYMPHOCYTES # BLD AUTO: 35.8 % — SIGNIFICANT CHANGE UP (ref 13–44)
MCHC RBC-ENTMCNC: 27.6 PG — SIGNIFICANT CHANGE UP (ref 27–34)
MCHC RBC-ENTMCNC: 31.3 GM/DL — LOW (ref 32–36)
MCV RBC AUTO: 88.3 FL — SIGNIFICANT CHANGE UP (ref 80–100)
MONOCYTES # BLD AUTO: 0.65 K/UL — SIGNIFICANT CHANGE UP (ref 0–0.9)
MONOCYTES NFR BLD AUTO: 6.2 % — SIGNIFICANT CHANGE UP (ref 2–14)
NEUTROPHILS # BLD AUTO: 5.57 K/UL — SIGNIFICANT CHANGE UP (ref 1.8–7.4)
NEUTROPHILS NFR BLD AUTO: 52.9 % — SIGNIFICANT CHANGE UP (ref 43–77)
NRBC # BLD: 0 /100 WBCS — SIGNIFICANT CHANGE UP (ref 0–0)
PLATELET # BLD AUTO: 402 K/UL — HIGH (ref 150–400)
POTASSIUM SERPL-MCNC: 3.8 MMOL/L — SIGNIFICANT CHANGE UP (ref 3.5–5.3)
POTASSIUM SERPL-MCNC: 3.9 MMOL/L — SIGNIFICANT CHANGE UP (ref 3.5–5.3)
POTASSIUM SERPL-SCNC: 3.8 MMOL/L — SIGNIFICANT CHANGE UP (ref 3.5–5.3)
POTASSIUM SERPL-SCNC: 3.9 MMOL/L — SIGNIFICANT CHANGE UP (ref 3.5–5.3)
PROT SERPL-MCNC: 7.5 G/DL — SIGNIFICANT CHANGE UP (ref 6–8.3)
RBC # BLD: 4.96 M/UL — SIGNIFICANT CHANGE UP (ref 4.2–5.8)
RBC # FLD: 13.2 % — SIGNIFICANT CHANGE UP (ref 10.3–14.5)
SODIUM SERPL-SCNC: 137 MMOL/L — SIGNIFICANT CHANGE UP (ref 135–145)
WBC # BLD: 10.51 K/UL — HIGH (ref 3.8–10.5)
WBC # FLD AUTO: 10.51 K/UL — HIGH (ref 3.8–10.5)

## 2023-11-07 PROCEDURE — 93971 EXTREMITY STUDY: CPT | Mod: 26,RT

## 2023-11-07 PROCEDURE — 99222 1ST HOSP IP/OBS MODERATE 55: CPT

## 2023-11-07 PROCEDURE — 99254 IP/OBS CNSLTJ NEW/EST MOD 60: CPT

## 2023-11-07 PROCEDURE — 73590 X-RAY EXAM OF LOWER LEG: CPT | Mod: 26,RT

## 2023-11-07 PROCEDURE — 99285 EMERGENCY DEPT VISIT HI MDM: CPT

## 2023-11-07 RX ORDER — SODIUM CHLORIDE 9 MG/ML
250 INJECTION, SOLUTION INTRAVENOUS ONCE
Refills: 0 | Status: COMPLETED | OUTPATIENT
Start: 2023-11-07 | End: 2023-11-07

## 2023-11-07 RX ORDER — NICOTINE POLACRILEX 2 MG
1 GUM BUCCAL ONCE
Refills: 0 | Status: COMPLETED | OUTPATIENT
Start: 2023-11-07 | End: 2023-11-08

## 2023-11-07 RX ORDER — VANCOMYCIN HCL 1 G
1000 VIAL (EA) INTRAVENOUS EVERY 12 HOURS
Refills: 0 | Status: DISCONTINUED | OUTPATIENT
Start: 2023-11-08 | End: 2023-11-08

## 2023-11-07 RX ORDER — ACETAMINOPHEN 500 MG
650 TABLET ORAL ONCE
Refills: 0 | Status: COMPLETED | OUTPATIENT
Start: 2023-11-07 | End: 2023-11-07

## 2023-11-07 RX ORDER — VANCOMYCIN HCL 1 G
1000 VIAL (EA) INTRAVENOUS ONCE
Refills: 0 | Status: COMPLETED | OUTPATIENT
Start: 2023-11-07 | End: 2023-11-07

## 2023-11-07 RX ORDER — PIPERACILLIN AND TAZOBACTAM 4; .5 G/20ML; G/20ML
3.38 INJECTION, POWDER, LYOPHILIZED, FOR SOLUTION INTRAVENOUS ONCE
Refills: 0 | Status: COMPLETED | OUTPATIENT
Start: 2023-11-07 | End: 2023-11-07

## 2023-11-07 RX ADMIN — Medication 650 MILLIGRAM(S): at 17:28

## 2023-11-07 RX ADMIN — PIPERACILLIN AND TAZOBACTAM 200 GRAM(S): 4; .5 INJECTION, POWDER, LYOPHILIZED, FOR SOLUTION INTRAVENOUS at 04:57

## 2023-11-07 RX ADMIN — SODIUM CHLORIDE 500 MILLILITER(S): 9 INJECTION, SOLUTION INTRAVENOUS at 15:29

## 2023-11-07 RX ADMIN — Medication 250 MILLIGRAM(S): at 07:00

## 2023-11-07 RX ADMIN — Medication 650 MILLIGRAM(S): at 16:50

## 2023-11-07 NOTE — H&P ADULT - HISTORY OF PRESENT ILLNESS
NEUROSURGERY - HOSPITAL MEDICINE CO-MANAGEMENT INITIAL VISIT NOTE    CHIEF COMPLAINT: Patient is a 28y old  Male who presents with a chief complaint of wound.     HPI: 27 yo w reported h/o venous insufficiency presents with persistent and worsening leg wound that he has had for over a month. He states he has had discoloration of his right leg "for a while".  He suffered a puncture wound during an accident about a month ago, which has been worsening over the last month.      He has seen a vascular doctor several times in the last month and he was told he has vascular insufficiency. He was prescribed different antibiotics including doxycycline, cephalexin, mupirocin ointment, but he admits he has not been good about taking them as indicated. He denies fevers although endorses chills.  He denies numbness or tingling of the affected leg.      Allergies    No Known Drug Allergies  avocado (Hives; Swelling)  Kiwi (Hives; Swelling)    Home Medications: Antibiotics        PAST MEDICAL & SURGICAL HISTORY:  Venous insufficiency      [x ] Reviewed     Functional Assessment: [ x] Independent  [ ] Assistance  [ ] Total care  [ ] Non-ambulatory    SOCIAL HISTORY:  Residence: [ ] [facility]  [ ] SNF  [x ] Community  [ ] Substance abuse:     FAMILY HISTORY:  [ x] No pertinent family history in first degree relatives of cellulitis    REVIEW OF SYSTEMS:    CONSTITUTIONAL: No fever, weight loss, or fatigue  EYES: No eye pain, visual disturbances, or discharge  ENMT:  No difficulty hearing, tinnitus, vertigo; No sinus or throat pain  NECK: No pain or stiffness  BREASTS: No pain, masses, or nipple discharge  RESPIRATORY: No cough, wheezing, chills or hemoptysis; No shortness of breath  CARDIOVASCULAR: No chest pain, palpitations, dizziness, or leg swelling  GASTROINTESTINAL: No abdominal or epigastric pain. No nausea, vomiting, or hematemesis; No diarrhea or constipation. No melena or hematochezia.  GENITOURINARY: No dysuria, frequency, hematuria, or incontinence  NEUROLOGICAL: No headaches, memory loss, loss of strength, numbness, or tremors  SKIN: Cellulitis  LYMPH NODES: No enlarged glands  ENDOCRINE: No heat or cold intolerance; No hair loss  MUSCULOSKELETAL: No muscle or back pain  PSYCHIATRIC: No depression, anxiety, mood swings, or difficulty sleeping  HEME/LYMPH: No easy bruising, or bleeding gums  ALLERGY AND IMMUNOLOGIC: No hives or eczema    [x  ] All other ROS negative  [  ] Unable to obtain due to poor mental status    PHYSICAL EXAM:    Vital Signs Last 24 Hrs  T(C): 36.5 (07 Nov 2023 11:49), Max: 36.8 (07 Nov 2023 08:17)  T(F): 97.7 (07 Nov 2023 11:49), Max: 98.2 (07 Nov 2023 08:17)  HR: 74 (07 Nov 2023 11:49) (74 - 97)  BP: 147/78 (07 Nov 2023 11:49) (142/89 - 163/102)  BP(mean): --  RR: 18 (07 Nov 2023 11:49) (17 - 18)  SpO2: 100% (07 Nov 2023 11:49) (98% - 100%)    Parameters below as of 07 Nov 2023 11:49  Patient On (Oxygen Delivery Method): room air        CONSTITUTIONAL: Well-groomed, in no apparent distress  EYES: No conjunctival or scleral injection, non-icteric; PERRLA and symmetric  ENMT: No external nasal lesions; nasal mucosa not inflamed; normal dentition; no pharyngeal injection or exudates, oral mucosa with moist membranes  NECK: Trachea midline without palpable neck mass; thyroid not enlarged and non-tender  RESPIRATORY: Breathing comfortably; no dullness to percussion; lungs CTA without wheeze/rhonchi/rales  CARDIOVASCULAR: +S1S2, RRR, no M/G/R; no carotid bruits; pedal pulses full and symmetric; no lower extremity edema  CHEST/BREAST: Breasts are symmetric in appearance; no palpable masses or lumps  GASTROINTESTINAL: No palpable masses or tenderness, +BS throughout, no rebound/guarding; no hepatosplenomegaly; no hernia palpated  LYMPHATIC: No cervical LAD or tenderness; no axillary LAD or tenderness; no inguinal LAD or tenderness  MUSCULOSKELETAL: Normal gait and station; no digital clubbing or cyanosis; no paraspinal tenderness; examination of the (head/neck, spine/ribs/pelvis, RUE, LUE, RLE, LLE) without misalignment, normal strength and tone of extremities  SKIN: Cellulitis  NEUROLOGIC: CN II-XII intact; normal reflexes in upper and lower extremities; sensation intact in LEs b/l to light touch  PSYCHIATRIC: A+O x 3; mood and affect appropriate; appropriate insight and judgment    LABS:                        13.7   10.51 )-----------( 402      ( 07 Nov 2023 05:25 )             43.8     Hemoglobin: 13.7 g/dL (11-07 @ 05:25)    11-07    137  |  101  |  17  ----------------------------<  96  3.9   |  18<L>  |  0.92    Ca    9.5      07 Nov 2023 05:25    TPro  7.5  /  Alb  4.4  /  TBili  0.1<L>  /  DBili  x   /  AST  17  /  ALT  16  /  AlkPhos  88  11-07      Urinalysis Basic - ( 07 Nov 2023 05:25 )    Color: x / Appearance: x / SG: x / pH: x  Gluc: 96 mg/dL / Ketone: x  / Bili: x / Urobili: x   Blood: x / Protein: x / Nitrite: x   Leuk Esterase: x / RBC: x / WBC x   Sq Epi: x / Non Sq Epi: x / Bacteria: x                      [ ] Consultant(s) Notes Reviewed  [x] Care Discussed with Consultants/Other Providers: NP     NEUROSURGERY - HOSPITAL MEDICINE CO-MANAGEMENT INITIAL VISIT NOTE    CHIEF COMPLAINT: Patient is a 28y old  Male who presents with a chief complaint of wound.     HPI: 27 yo w reported h/o venous insufficiency presents with persistent and worsening leg wound that he has had for over a month. He states he has had discoloration of his right leg "for a while".  He suffered a puncture wound during an accident about a month ago, which has been worsening over the last month.      He has seen a vascular doctor several times in the last month and he was told he has vascular insufficiency. He was prescribed different antibiotics including doxycycline, cephalexin, mupirocin ointment, but he admits he has not been good about taking them as indicated. He denies fevers although endorses chills.  He denies numbness or tingling of the affected leg.      Allergies    No Known Drug Allergies  avocado (Hives; Swelling)  Kiwi (Hives; Swelling)    Home Medications: Antibiotics        PAST MEDICAL & SURGICAL HISTORY:  Venous insufficiency      [x ] Reviewed     Functional Assessment: [ x] Independent  [ ] Assistance  [ ] Total care  [ ] Non-ambulatory    SOCIAL HISTORY:  Residence: [ ] [facility]  [ ] SNF  [x ] Community  [ ] Substance abuse:     FAMILY HISTORY:  [ x] No pertinent family history in first degree relatives of cellulitis    REVIEW OF SYSTEMS:    CONSTITUTIONAL: No fever, weight loss, or fatigue  EYES: No eye pain, visual disturbances, or discharge  ENMT:  No difficulty hearing, tinnitus, vertigo; No sinus or throat pain  NECK: No pain or stiffness  BREASTS: No pain, masses, or nipple discharge  RESPIRATORY: No cough, wheezing, chills or hemoptysis; No shortness of breath  CARDIOVASCULAR: No chest pain, palpitations, dizziness, or leg swelling  GASTROINTESTINAL: No abdominal or epigastric pain. No nausea, vomiting, or hematemesis; No diarrhea or constipation. No melena or hematochezia.  GENITOURINARY: No dysuria, frequency, hematuria, or incontinence  NEUROLOGICAL: No headaches, memory loss, loss of strength, numbness, or tremors  SKIN: Cellulitis  LYMPH NODES: No enlarged glands  ENDOCRINE: No heat or cold intolerance; No hair loss  MUSCULOSKELETAL: No muscle or back pain  PSYCHIATRIC: No depression, anxiety, mood swings, or difficulty sleeping  HEME/LYMPH: No easy bruising, or bleeding gums  ALLERGY AND IMMUNOLOGIC: No hives or eczema    [x  ] All other ROS negative  [  ] Unable to obtain due to poor mental status    PHYSICAL EXAM:    Vital Signs Last 24 Hrs  T(C): 36.5 (07 Nov 2023 11:49), Max: 36.8 (07 Nov 2023 08:17)  T(F): 97.7 (07 Nov 2023 11:49), Max: 98.2 (07 Nov 2023 08:17)  HR: 74 (07 Nov 2023 11:49) (74 - 97)  BP: 147/78 (07 Nov 2023 11:49) (142/89 - 163/102)  BP(mean): --  RR: 18 (07 Nov 2023 11:49) (17 - 18)  SpO2: 100% (07 Nov 2023 11:49) (98% - 100%)    Parameters below as of 07 Nov 2023 11:49  Patient On (Oxygen Delivery Method): room air        CONSTITUTIONAL: Well-groomed, in no apparent distress  EYES: No conjunctival or scleral injection, non-icteric; PERRLA and symmetric  ENMT: No external nasal lesions; nasal mucosa not inflamed; normal dentition; no pharyngeal injection or exudates, oral mucosa with moist membranes  NECK: Trachea midline without palpable neck mass; thyroid not enlarged and non-tender  RESPIRATORY: Breathing comfortably; no dullness to percussion; lungs CTA without wheeze/rhonchi/rales  CARDIOVASCULAR: +S1S2, RRR, no M/G/R; no carotid bruits; pedal pulses full and symmetric; no lower extremity edema  CHEST/BREAST: Breasts are symmetric in appearance; no palpable masses or lumps  GASTROINTESTINAL: No palpable masses or tenderness, +BS throughout, no rebound/guarding; no hepatosplenomegaly; no hernia palpated  LYMPHATIC: No cervical LAD or tenderness; no axillary LAD or tenderness; no inguinal LAD or tenderness  MUSCULOSKELETAL: Normal gait and station; no digital clubbing or cyanosis; no paraspinal tenderness; examination of the (head/neck, spine/ribs/pelvis, RUE, LUE, RLE, LLE) without misalignment, normal strength and tone of extremities  SKIN: Cellulitis  NEUROLOGIC: CN II-XII intact; normal reflexes in upper and lower extremities; sensation intact in LEs b/l to light touch  PSYCHIATRIC: A+O x 3; mood and affect appropriate; appropriate insight and judgment    LABS:                        13.7   10.51 )-----------( 402      ( 07 Nov 2023 05:25 )             43.8     Hemoglobin: 13.7 g/dL (11-07 @ 05:25)    11-07    137  |  101  |  17  ----------------------------<  96  3.9   |  18<L>  |  0.92    Ca    9.5      07 Nov 2023 05:25    TPro  7.5  /  Alb  4.4  /  TBili  0.1<L>  /  DBili  x   /  AST  17  /  ALT  16  /  AlkPhos  88  11-07      Urinalysis Basic - ( 07 Nov 2023 05:25 )    Color: x / Appearance: x / SG: x / pH: x  Gluc: 96 mg/dL / Ketone: x  / Bili: x / Urobili: x   Blood: x / Protein: x / Nitrite: x   Leuk Esterase: x / RBC: x / WBC x   Sq Epi: x / Non Sq Epi: x / Bacteria: x                      [ ] Consultant(s) Notes Reviewed  [x] Care Discussed with Consultants/Other Providers: NP     NEUROSURGERY - HOSPITAL MEDICINE CO-MANAGEMENT INITIAL VISIT NOTE    CHIEF COMPLAINT: Patient is a 28y old  Male who presents with a chief complaint of wound.     HPI: 29 yo w reported h/o venous insufficiency presents with persistent and worsening leg wound that he has had for over a month. He states he has had discoloration of his right leg "for a while".  He suffered a puncture wound during an accident about a month ago, which has been worsening over the last month.      He has seen a vascular doctor several times in the last month and he was told he has vascular insufficiency. He was prescribed different antibiotics including doxycycline, cephalexin, mupirocin ointment, but he admits he has not been good about taking them as indicated. He denies fevers although endorses chills.  He denies numbness or tingling of the affected leg.      Allergies    No Known Drug Allergies  avocado (Hives; Swelling)  Kiwi (Hives; Swelling)    Home Medications: Antibiotics        PAST MEDICAL & SURGICAL HISTORY:  Venous insufficiency      [x ] Reviewed     Functional Assessment: [ x] Independent  [ ] Assistance  [ ] Total care  [ ] Non-ambulatory    SOCIAL HISTORY:  Residence: [ ] [facility]  [ ] SNF  [x ] Community  [ ] Substance abuse:     FAMILY HISTORY:  [ x] No pertinent family history in first degree relatives of cellulitis    REVIEW OF SYSTEMS:    CONSTITUTIONAL: No fever, weight loss, or fatigue  EYES: No eye pain, visual disturbances, or discharge  ENMT:  No difficulty hearing, tinnitus, vertigo; No sinus or throat pain  NECK: No pain or stiffness  BREASTS: No pain, masses, or nipple discharge  RESPIRATORY: No cough, wheezing, chills or hemoptysis; No shortness of breath  CARDIOVASCULAR: No chest pain, palpitations, dizziness, or leg swelling  GASTROINTESTINAL: No abdominal or epigastric pain. No nausea, vomiting, or hematemesis; No diarrhea or constipation. No melena or hematochezia.  GENITOURINARY: No dysuria, frequency, hematuria, or incontinence  NEUROLOGICAL: No headaches, memory loss, loss of strength, numbness, or tremors  SKIN: Cellulitis  LYMPH NODES: No enlarged glands  ENDOCRINE: No heat or cold intolerance; No hair loss  MUSCULOSKELETAL: No muscle or back pain  PSYCHIATRIC: No depression, anxiety, mood swings, or difficulty sleeping  HEME/LYMPH: No easy bruising, or bleeding gums  ALLERGY AND IMMUNOLOGIC: No hives or eczema    [x  ] All other ROS negative  [  ] Unable to obtain due to poor mental status    PHYSICAL EXAM:    Vital Signs Last 24 Hrs  T(C): 36.5 (07 Nov 2023 11:49), Max: 36.8 (07 Nov 2023 08:17)  T(F): 97.7 (07 Nov 2023 11:49), Max: 98.2 (07 Nov 2023 08:17)  HR: 74 (07 Nov 2023 11:49) (74 - 97)  BP: 147/78 (07 Nov 2023 11:49) (142/89 - 163/102)  BP(mean): --  RR: 18 (07 Nov 2023 11:49) (17 - 18)  SpO2: 100% (07 Nov 2023 11:49) (98% - 100%)    Parameters below as of 07 Nov 2023 11:49  Patient On (Oxygen Delivery Method): room air        CONSTITUTIONAL: Well-groomed, in no apparent distress  EYES: No conjunctival or scleral injection, non-icteric; PERRLA and symmetric  ENMT: No external nasal lesions; nasal mucosa not inflamed; normal dentition; no pharyngeal injection or exudates, oral mucosa with moist membranes  NECK: Trachea midline without palpable neck mass; thyroid not enlarged and non-tender  RESPIRATORY: Breathing comfortably; no dullness to percussion; lungs CTA without wheeze/rhonchi/rales  CARDIOVASCULAR: +S1S2, RRR, no M/G/R; no carotid bruits; pedal pulses full and symmetric; no lower extremity edema  CHEST/BREAST: Breasts are symmetric in appearance; no palpable masses or lumps  GASTROINTESTINAL: No palpable masses or tenderness, +BS throughout, no rebound/guarding; no hepatosplenomegaly; no hernia palpated  LYMPHATIC: No cervical LAD or tenderness; no axillary LAD or tenderness; no inguinal LAD or tenderness  MUSCULOSKELETAL: Normal gait and station; no digital clubbing or cyanosis; no paraspinal tenderness; examination of the (head/neck, spine/ribs/pelvis, RUE, LUE, RLE, LLE) without misalignment, normal strength and tone of extremities  SKIN: Cellulitis  NEUROLOGIC: CN II-XII intact; normal reflexes in upper and lower extremities; sensation intact in LEs b/l to light touch  PSYCHIATRIC: A+O x 3; mood and affect appropriate; appropriate insight and judgment    LABS:                        13.7   10.51 )-----------( 402      ( 07 Nov 2023 05:25 )             43.8     Hemoglobin: 13.7 g/dL (11-07 @ 05:25)    11-07    137  |  101  |  17  ----------------------------<  96  3.9   |  18<L>  |  0.92    Ca    9.5      07 Nov 2023 05:25    TPro  7.5  /  Alb  4.4  /  TBili  0.1<L>  /  DBili  x   /  AST  17  /  ALT  16  /  AlkPhos  88  11-07      Urinalysis Basic - ( 07 Nov 2023 05:25 )    Color: x / Appearance: x / SG: x / pH: x  Gluc: 96 mg/dL / Ketone: x  / Bili: x / Urobili: x   Blood: x / Protein: x / Nitrite: x   Leuk Esterase: x / RBC: x / WBC x   Sq Epi: x / Non Sq Epi: x / Bacteria: x                      [ ] Consultant(s) Notes Reviewed  [x] Care Discussed with Consultants/Other Providers: NP     CHIEF COMPLAINT: Patient is a 28y old  Male who presents with a chief complaint of wound.     HPI: 27 yo w reported h/o venous insufficiency presents with persistent and worsening leg wound that he has had for over a month. He states he has had discoloration of his right leg "for a while".  He suffered a puncture wound during an accident about a month ago, which has been worsening over the last month.      He has seen a vascular doctor several times in the last month and he was told he has vascular insufficiency. He was prescribed different antibiotics including doxycycline, cephalexin, mupirocin ointment, but he admits he has not been good about taking them as indicated. He denies fevers although endorses chills.  He denies numbness or tingling of the affected leg.      Allergies    No Known Drug Allergies  avocado (Hives; Swelling)  Kiwi (Hives; Swelling)    Home Medications: Antibiotics        PAST MEDICAL & SURGICAL HISTORY:  Venous insufficiency      [x ] Reviewed     Functional Assessment: [ x] Independent  [ ] Assistance  [ ] Total care  [ ] Non-ambulatory    SOCIAL HISTORY:  Residence: [ ] [facility]  [ ] SNF  [x ] Community  [ ] Substance abuse:     FAMILY HISTORY:  [ x] No pertinent family history in first degree relatives of cellulitis    REVIEW OF SYSTEMS:    CONSTITUTIONAL: No fever, weight loss, or fatigue  EYES: No eye pain, visual disturbances, or discharge  ENMT:  No difficulty hearing, tinnitus, vertigo; No sinus or throat pain  NECK: No pain or stiffness  BREASTS: No pain, masses, or nipple discharge  RESPIRATORY: No cough, wheezing, chills or hemoptysis; No shortness of breath  CARDIOVASCULAR: No chest pain, palpitations, dizziness, or leg swelling  GASTROINTESTINAL: No abdominal or epigastric pain. No nausea, vomiting, or hematemesis; No diarrhea or constipation. No melena or hematochezia.  GENITOURINARY: No dysuria, frequency, hematuria, or incontinence  NEUROLOGICAL: No headaches, memory loss, loss of strength, numbness, or tremors  SKIN: Cellulitis  LYMPH NODES: No enlarged glands  ENDOCRINE: No heat or cold intolerance; No hair loss  MUSCULOSKELETAL: No muscle or back pain  PSYCHIATRIC: No depression, anxiety, mood swings, or difficulty sleeping  HEME/LYMPH: No easy bruising, or bleeding gums  ALLERGY AND IMMUNOLOGIC: No hives or eczema    [x  ] All other ROS negative  [  ] Unable to obtain due to poor mental status    PHYSICAL EXAM:    Vital Signs Last 24 Hrs  T(C): 36.5 (07 Nov 2023 11:49), Max: 36.8 (07 Nov 2023 08:17)  T(F): 97.7 (07 Nov 2023 11:49), Max: 98.2 (07 Nov 2023 08:17)  HR: 74 (07 Nov 2023 11:49) (74 - 97)  BP: 147/78 (07 Nov 2023 11:49) (142/89 - 163/102)  BP(mean): --  RR: 18 (07 Nov 2023 11:49) (17 - 18)  SpO2: 100% (07 Nov 2023 11:49) (98% - 100%)    Parameters below as of 07 Nov 2023 11:49  Patient On (Oxygen Delivery Method): room air        CONSTITUTIONAL: Well-groomed, in no apparent distress  EYES: No conjunctival or scleral injection, non-icteric; PERRLA and symmetric  ENMT: No external nasal lesions; nasal mucosa not inflamed; normal dentition; no pharyngeal injection or exudates, oral mucosa with moist membranes  NECK: Trachea midline without palpable neck mass; thyroid not enlarged and non-tender  RESPIRATORY: Breathing comfortably; no dullness to percussion; lungs CTA without wheeze/rhonchi/rales  CARDIOVASCULAR: +S1S2, RRR, no M/G/R; no carotid bruits; pedal pulses full and symmetric; no lower extremity edema  CHEST/BREAST: Breasts are symmetric in appearance; no palpable masses or lumps  GASTROINTESTINAL: No palpable masses or tenderness, +BS throughout, no rebound/guarding; no hepatosplenomegaly; no hernia palpated  LYMPHATIC: No cervical LAD or tenderness; no axillary LAD or tenderness; no inguinal LAD or tenderness  MUSCULOSKELETAL: Normal gait and station; no digital clubbing or cyanosis; no paraspinal tenderness; examination of the (head/neck, spine/ribs/pelvis, RUE, LUE, RLE, LLE) without misalignment, normal strength and tone of extremities  SKIN: Cellulitis  NEUROLOGIC: CN II-XII intact; normal reflexes in upper and lower extremities; sensation intact in LEs b/l to light touch  PSYCHIATRIC: A+O x 3; mood and affect appropriate; appropriate insight and judgment    LABS:                        13.7   10.51 )-----------( 402      ( 07 Nov 2023 05:25 )             43.8     Hemoglobin: 13.7 g/dL (11-07 @ 05:25)    11-07    137  |  101  |  17  ----------------------------<  96  3.9   |  18<L>  |  0.92    Ca    9.5      07 Nov 2023 05:25    TPro  7.5  /  Alb  4.4  /  TBili  0.1<L>  /  DBili  x   /  AST  17  /  ALT  16  /  AlkPhos  88  11-07      Urinalysis Basic - ( 07 Nov 2023 05:25 )    Color: x / Appearance: x / SG: x / pH: x  Gluc: 96 mg/dL / Ketone: x  / Bili: x / Urobili: x   Blood: x / Protein: x / Nitrite: x   Leuk Esterase: x / RBC: x / WBC x   Sq Epi: x / Non Sq Epi: x / Bacteria: x                      [ ] Consultant(s) Notes Reviewed  [x] Care Discussed with Consultants/Other Providers: NP     CHIEF COMPLAINT: Patient is a 28y old  Male who presents with a chief complaint of wound.     HPI: 29 yo w reported h/o venous insufficiency presents with persistent and worsening leg wound that he has had for over a month. He states he has had discoloration of his right leg "for a while".  He suffered a puncture wound during an accident about a month ago, which has been worsening over the last month.      He has seen a vascular doctor several times in the last month and he was told he has vascular insufficiency. He was prescribed different antibiotics including doxycycline, cephalexin, mupirocin ointment, but he admits he has not been good about taking them as indicated. He denies fevers although endorses chills.  He denies numbness or tingling of the affected leg.      Allergies    No Known Drug Allergies  avocado (Hives; Swelling)  Kiwi (Hives; Swelling)    Home Medications: Antibiotics        PAST MEDICAL & SURGICAL HISTORY:  Venous insufficiency      [x ] Reviewed     Functional Assessment: [ x] Independent  [ ] Assistance  [ ] Total care  [ ] Non-ambulatory    SOCIAL HISTORY:  Residence: [ ] [facility]  [ ] SNF  [x ] Community  [ ] Substance abuse:     FAMILY HISTORY:  [ x] No pertinent family history in first degree relatives of cellulitis    REVIEW OF SYSTEMS:    CONSTITUTIONAL: No fever, weight loss, or fatigue  EYES: No eye pain, visual disturbances, or discharge  ENMT:  No difficulty hearing, tinnitus, vertigo; No sinus or throat pain  NECK: No pain or stiffness  BREASTS: No pain, masses, or nipple discharge  RESPIRATORY: No cough, wheezing, chills or hemoptysis; No shortness of breath  CARDIOVASCULAR: No chest pain, palpitations, dizziness, or leg swelling  GASTROINTESTINAL: No abdominal or epigastric pain. No nausea, vomiting, or hematemesis; No diarrhea or constipation. No melena or hematochezia.  GENITOURINARY: No dysuria, frequency, hematuria, or incontinence  NEUROLOGICAL: No headaches, memory loss, loss of strength, numbness, or tremors  SKIN: Cellulitis  LYMPH NODES: No enlarged glands  ENDOCRINE: No heat or cold intolerance; No hair loss  MUSCULOSKELETAL: No muscle or back pain  PSYCHIATRIC: No depression, anxiety, mood swings, or difficulty sleeping  HEME/LYMPH: No easy bruising, or bleeding gums  ALLERGY AND IMMUNOLOGIC: No hives or eczema    [x  ] All other ROS negative  [  ] Unable to obtain due to poor mental status    PHYSICAL EXAM:    Vital Signs Last 24 Hrs  T(C): 36.5 (07 Nov 2023 11:49), Max: 36.8 (07 Nov 2023 08:17)  T(F): 97.7 (07 Nov 2023 11:49), Max: 98.2 (07 Nov 2023 08:17)  HR: 74 (07 Nov 2023 11:49) (74 - 97)  BP: 147/78 (07 Nov 2023 11:49) (142/89 - 163/102)  BP(mean): --  RR: 18 (07 Nov 2023 11:49) (17 - 18)  SpO2: 100% (07 Nov 2023 11:49) (98% - 100%)    Parameters below as of 07 Nov 2023 11:49  Patient On (Oxygen Delivery Method): room air        CONSTITUTIONAL: Well-groomed, in no apparent distress  EYES: No conjunctival or scleral injection, non-icteric; PERRLA and symmetric  ENMT: No external nasal lesions; nasal mucosa not inflamed; normal dentition; no pharyngeal injection or exudates, oral mucosa with moist membranes  NECK: Trachea midline without palpable neck mass; thyroid not enlarged and non-tender  RESPIRATORY: Breathing comfortably; no dullness to percussion; lungs CTA without wheeze/rhonchi/rales  CARDIOVASCULAR: +S1S2, RRR, no M/G/R; no carotid bruits; pedal pulses full and symmetric; no lower extremity edema  CHEST/BREAST: Breasts are symmetric in appearance; no palpable masses or lumps  GASTROINTESTINAL: No palpable masses or tenderness, +BS throughout, no rebound/guarding; no hepatosplenomegaly; no hernia palpated  LYMPHATIC: No cervical LAD or tenderness; no axillary LAD or tenderness; no inguinal LAD or tenderness  MUSCULOSKELETAL: Normal gait and station; no digital clubbing or cyanosis; no paraspinal tenderness; examination of the (head/neck, spine/ribs/pelvis, RUE, LUE, RLE, LLE)

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
## 2023-11-07 NOTE — ED ADULT NURSE REASSESSMENT NOTE - NS ED NURSE REASSESS COMMENT FT1
Lab contacted to ensure that blood cultures were received. AS per  blood cultures are being processed in the lab.
pt sitting up in bed, vanco complete, R lower leg trophic changes noted, open wounds noted, +pedal pulse/sensation/AROM intact, no active oozing noted, +smoker,+ambulatory, no c/o offered, +RTM, bed pending
36.5

## 2023-11-07 NOTE — ED ADULT NURSE NOTE - NSSEPSISSUSPECTED_ED_A_ED
Social Work Note  2/10/2021      Received call from Demetrio Jesus  with [address] [place] ([facility]). She advised that she was trying to reach patient but did not have patient's number. SW confirmed patient identity with two identifiers. Ms. Geno Kauffman stated that she works with patient's Medicaid plan and wanted to follow-up with her to check in and offer services. She advised that she received paperwork from SNF stay in April-May 2020. SW explained relationship to patient. Attempted to reach patient for conference call with Ms. Geno Kauffman. Patient did not answer. Message left on voicemail requesting return call. SW provided updated patient contact information to [facility] . She stated that she will follow-up with patient to offer services. ROSA Plan:   Attempt to reach patient for follow-up.      JET Templeton, ACSW, [facility]    Ambulatory Care Management   [phone]
No

## 2023-11-07 NOTE — ED PROVIDER NOTE - PHYSICAL EXAMINATION
General: WN/WD NAD  Head: Normocephalic Atraumatic  Eyes: EOM grossly in tact, no scleral icterus  ENT: moist mucous membranes, neck supple   CV: Extremities warm and well perfused, +2 DP pulses in the LE b/l   Abdominal: Soft, non-distended  Respiratory: normal respiratory effort  Neuro: A&Ox3  Extremities: moves all extremities without difficulty, no visible deformities  Skin: LE are warm and well perfused. purple discoloration to the right lower extremity with an ulcerated wound that has purulent discharge in the medial right calf, there is erythema surrounding the purple discoloration.  Patient has strong 2+ DP pulses bilaterally, the right calf is diffusely warm to touch.  There is no fluctuance surrounding the wound.  Patient does not have pitting edema of the right lower extremity, there is mild tenderness to palpation of the calf.

## 2023-11-07 NOTE — ED PROVIDER NOTE - PROGRESS NOTE DETAILS
Kesha Nunn MD, PGY2: reviewed results of labs and imaging, no dvt or evidence of osteomyelitis, discussed with pt plan for admission, pt agrees with plan. all questions answered

## 2023-11-07 NOTE — ED ADULT NURSE NOTE - CAS EDP DISCH DISPOSITION ADMI
[facility]
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
## 2023-11-07 NOTE — H&P ADULT - PROBLEM SELECTOR PLAN 1
On exam patient has significant purple discoloration of the leg with good distal perfusion with intact cap refill, warm, intact pulses. He has an open wound on the lateral aspect of the mid calf with surrounding discoloration and tissue necrosis with mild purulent discharge.      Xray without evidence of osteomyelitis as above. Low suspicion for arterial insufficiency given good perfusion on exam, chronicity of symptoms.     Given 1 dose of Vanc/Zosyn in the ED. Continue Vanc for now. Send wound swab culture. Will get wound care and ID input. On exam patient has significant purple discoloration of the leg with good distal perfusion with intact cap refill, warm, intact pulses. He has an open wound on the lateral aspect of the mid calf with surrounding discoloration and tissue necrosis with mild purulent discharge.      Xray without evidence of osteomyelitis as above. Low suspicion for arterial insufficiency given good perfusion on exam, chronicity of symptoms.     Given 1 dose of Vanc/Zosyn in the ED. Continue Vanc for now for cellulitis. Send wound swab culture. Will get wound care and ID input.

## 2023-11-07 NOTE — ED ADULT NURSE NOTE - NSFALLUNIVINTERV_ED_ALL_ED
Bed/Stretcher in lowest position, wheels locked, appropriate side rails in place/Call bell, personal items and telephone in reach/Instruct patient to call for assistance before getting out of bed/chair/stretcher/Non-slip footwear applied when patient is off stretcher/[place] to call system/Physically safe environment - no spills, clutter or unnecessary equipment/Purposeful proactive rounding/Room/bathroom lighting operational, light cord in reach Bed/Stretcher in lowest position, wheels locked, appropriate side rails in place/Call bell, personal items and telephone in reach/Instruct patient to call for assistance before getting out of bed/chair/stretcher/Non-slip footwear applied when patient is off stretcher/[place] to call system/Physically safe environment - no spills, clutter or unnecessary equipment/Purposeful proactive rounding/Room/bathroom lighting operational, light cord in reach Bed/Stretcher in lowest position, wheels locked, appropriate side rails in place/Call bell, personal items and telephone in reach/Instruct patient to call for assistance before getting out of bed/chair/stretcher/Non-slip footwear applied when patient is off stretcher/[place] to call system/Physically safe environment - no spills, clutter or unnecessary equipment/Purposeful proactive rounding/Room/bathroom lighting operational, light cord in reach
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
## 2023-11-07 NOTE — PATIENT PROFILE ADULT - FALL HARM RISK - UNIVERSAL INTERVENTIONS
Bed in lowest position, wheels locked, appropriate side rails in place/Call bell, personal items and telephone in reach/Instruct patient to call for assistance before getting out of bed or chair/Non-slip footwear when patient is out of bed/[place] to call system/Physically safe environment - no spills, clutter or unnecessary equipment/Purposeful Proactive Rounding/Room/bathroom lighting operational, light cord in reach Bed in lowest position, wheels locked, appropriate side rails in place/Call bell, personal items and telephone in reach/Instruct patient to call for assistance before getting out of bed or chair/Non-slip footwear when patient is out of bed/[place] to call system/Physically safe environment - no spills, clutter or unnecessary equipment/Purposeful Proactive Rounding/Room/bathroom lighting operational, light cord in reach Bed in lowest position, wheels locked, appropriate side rails in place/Call bell, personal items and telephone in reach/Instruct patient to call for assistance before getting out of bed or chair/Non-slip footwear when patient is out of bed/[place] to call system/Physically safe environment - no spills, clutter or unnecessary equipment/Purposeful Proactive Rounding/Room/bathroom lighting operational, light cord in reach

## 2023-11-07 NOTE — H&P ADULT - ASSESSMENT
29 yo w reported h/o venous insufficiency presents with persistent and worsening leg wound that he has had for over a month. He states he has had discoloration of his right leg "for a while".  He suffered a puncture wound during an accident about a month ago, which has been worsening over the last month.      He has seen a vascular doctor several times in the last month and he was told he has vascular insufficiency. He was prescribed different antibiotics including doxycycline, cephalexin, mupirocin ointment, but he admits he has not been good about taking them as indicated. He denies fevers although endorses chills. He denies numbness or tingling of the affected leg.     27 yo w reported h/o venous insufficiency presents with persistent and worsening leg wound that he has had for over a month. He states he has had discoloration of his right leg "for a while".  He suffered a puncture wound during an accident about a month ago, which has been worsening over the last month.      He has seen a vascular doctor several times in the last month and he was told he has vascular insufficiency. He was prescribed different antibiotics including doxycycline, cephalexin, mupirocin ointment, but he admits he has not been good about taking them as indicated. He denies fevers although endorses chills. He denies numbness or tingling of the affected leg.     27 yo w reported h/o venous insufficiency presents with persistent and worsening leg wound that he has had for over a month. He states he has had discoloration of his right leg "for a while".  He suffered a puncture wound during an accident about a month ago, which has been worsening over the last month.      He has seen a vascular doctor several times in the last month and he was told he has vascular insufficiency. He was prescribed different antibiotics including doxycycline, cephalexin, mupirocin ointment, but he admits he has not been good about taking them as indicated. He denies fevers although endorses chills. He denies numbness or tingling of the affected leg.      X Ray tib-fib- No tracking soft tissue gas collections, radiopaque foreign bodies, or gross radiographic evidence for osteomyelitis. No fractures, dislocations, or osseous or joint deformities. 29 yo w reported h/o venous insufficiency presents with persistent and worsening leg wound that he has had for over a month. He states he has had discoloration of his right leg "for a while".  He suffered a puncture wound during an accident about a month ago, which has been worsening over the last month.      He has seen a vascular doctor several times in the last month and he was told he has vascular insufficiency. He was prescribed different antibiotics including doxycycline, cephalexin, mupirocin ointment, but he admits he has not been good about taking them as indicated. He denies fevers although endorses chills. He denies numbness or tingling of the affected leg.      X Ray tib-fib- No tracking soft tissue gas collections, radiopaque foreign bodies, or gross radiographic evidence for osteomyelitis. No fractures, dislocations, or osseous or joint deformities.

## 2023-11-07 NOTE — H&P ADULT - SOCIAL HISTORY: TOBACCO USE
Smokes 1 ppd for the last 10 years Smokes 1 ppd for the last 10 years. Reported use of crystal meth and stimulants.

## 2023-11-08 LAB
GRAM STN FLD: ABNORMAL
HCT VFR BLD CALC: 42.2 % — SIGNIFICANT CHANGE UP (ref 39–50)
HGB BLD-MCNC: 13.5 G/DL — SIGNIFICANT CHANGE UP (ref 13–17)
MCHC RBC-ENTMCNC: 28.3 PG — SIGNIFICANT CHANGE UP (ref 27–34)
MCHC RBC-ENTMCNC: 32 GM/DL — SIGNIFICANT CHANGE UP (ref 32–36)
MCV RBC AUTO: 88.5 FL — SIGNIFICANT CHANGE UP (ref 80–100)
MRSA PCR RESULT.: SIGNIFICANT CHANGE UP
NRBC # BLD: 0 /100 WBCS — SIGNIFICANT CHANGE UP (ref 0–0)
PLATELET # BLD AUTO: 383 K/UL — SIGNIFICANT CHANGE UP (ref 150–400)
RBC # BLD: 4.77 M/UL — SIGNIFICANT CHANGE UP (ref 4.2–5.8)
RBC # FLD: 13 % — SIGNIFICANT CHANGE UP (ref 10.3–14.5)
S AUREUS DNA NOSE QL NAA+PROBE: SIGNIFICANT CHANGE UP
SPECIMEN SOURCE: SIGNIFICANT CHANGE UP
WBC # BLD: 7.38 K/UL — SIGNIFICANT CHANGE UP (ref 3.8–10.5)
WBC # FLD AUTO: 7.38 K/UL — SIGNIFICANT CHANGE UP (ref 3.8–10.5)

## 2023-11-08 PROCEDURE — 99223 1ST HOSP IP/OBS HIGH 75: CPT

## 2023-11-08 PROCEDURE — 99222 1ST HOSP IP/OBS MODERATE 55: CPT

## 2023-11-08 PROCEDURE — 99233 SBSQ HOSP IP/OBS HIGH 50: CPT

## 2023-11-08 PROCEDURE — 99232 SBSQ HOSP IP/OBS MODERATE 35: CPT

## 2023-11-08 RX ORDER — ACETAMINOPHEN 500 MG
650 TABLET ORAL EVERY 6 HOURS
Refills: 0 | Status: DISCONTINUED | OUTPATIENT
Start: 2023-11-08 | End: 2023-11-11

## 2023-11-08 RX ORDER — CHLORHEXIDINE GLUCONATE 213 G/1000ML
1 SOLUTION TOPICAL DAILY
Refills: 0 | Status: DISCONTINUED | OUTPATIENT
Start: 2023-11-08 | End: 2023-11-11

## 2023-11-08 RX ORDER — LANOLIN ALCOHOL/MO/W.PET/CERES
3 CREAM (GRAM) TOPICAL ONCE
Refills: 0 | Status: COMPLETED | OUTPATIENT
Start: 2023-11-08 | End: 2023-11-08

## 2023-11-08 RX ORDER — KETOROLAC TROMETHAMINE 30 MG/ML
15 SYRINGE (ML) INJECTION ONCE
Refills: 0 | Status: DISCONTINUED | OUTPATIENT
Start: 2023-11-08 | End: 2023-11-08

## 2023-11-08 RX ADMIN — Medication 1 PATCH: at 07:34

## 2023-11-08 RX ADMIN — Medication 250 MILLIGRAM(S): at 06:33

## 2023-11-08 RX ADMIN — Medication 15 MILLIGRAM(S): at 12:41

## 2023-11-08 RX ADMIN — Medication 3 MILLIGRAM(S): at 23:20

## 2023-11-08 RX ADMIN — Medication 1 PATCH: at 06:33

## 2023-11-08 RX ADMIN — Medication 15 MILLIGRAM(S): at 12:26

## 2023-11-08 RX ADMIN — Medication 1 PATCH: at 20:00

## 2023-11-08 NOTE — CONSULT NOTE ADULT - ASSESSMENT
A/P:    Wound Consult requested to assist w/ management of      RLE wound- pack w/ Dakins moistened saline  BLE elevation & Compression  Consider RLE MRI  Abx per Medicine/ ID  Moisturize intact skin w/ SWEEN cream BID  Nutrition Consult for optimization         encourage high quality protein, mario/ prosource, MVI & Vit C to promote wound healing  Continue turning and positioning w/ offloading assistive devices as per protocol  Buttocks/ Sacrum Continue w/ attends under pads and Pericare as per protocol  Waffle Cushion to chair when oob to chair  Continue w/ low air loss pressure redistribution bed surface   Care as per medicine, will follow w/ you  upon discharge f/u as outpatient at [facility] [address] [place] [address] [phone]  Seen w/ attng  and D/w team & RN  Thank you for this consult  Etelvina Hurt PA-C CWS [id number]  Nights/ Weekends/ Holidays please call:  General Surgery Consult pager ([phone]) for emergencies  Wound PT for multilayer leg wrapping or VAC issues (x [phone])   I spent 55  minutes face to face w/ this pt of which more than 50% of the time was spent counseling & coordinating care of this pt.  A/P:    Wound Consult requested to assist w/ management of      RLE wound- pack w/ Dakins moistened saline  BLE elevation & Compression  Consider RLE MRI  Abx per Medicine/ ID  Moisturize intact skin w/ SWEEN cream BID  Nutrition Consult for optimization         encourage high quality protein, mario/ prosource, MVI & Vit C to promote wound healing  Continue turning and positioning w/ offloading assistive devices as per protocol  Buttocks/ Sacrum Continue w/ attends under pads and Pericare as per protocol  Waffle Cushion to chair when oob to chair  Continue w/ low air loss pressure redistribution bed surface   Care as per medicine, will follow w/ you  upon discharge f/u as outpatient at [facility] [address] [place] [address] [phone]  Seen w/ attng  and D/w team & RN  Thank you for this consult  Etelvina Hurt PA-C CWS [id number]  Nights/ Weekends/ Holidays please call:  General Surgery Consult pager ([phone]) for emergencies  Wound PT for multilayer leg wrapping or VAC issues (x [phone])   I spent 55  minutes face to face w/ this pt of which more than 50% of the time was spent counseling & coordinating care of this pt.  A/P:    Wound Consult requested to assist w/ management of      RLE wound- pack w/ Dakins moistened saline  BLE elevation & Compression  Consider RLE MRI  Abx per Medicine/ ID  Moisturize intact skin w/ SWEEN cream BID  Nutrition Consult for optimization         encourage high quality protein, mario/ prosource, MVI & Vit C to promote wound healing  Continue turning and positioning w/ offloading assistive devices as per protocol  Buttocks/ Sacrum Continue w/ attends under pads and Pericare as per protocol  Waffle Cushion to chair when oob to chair  Continue w/ low air loss pressure redistribution bed surface   Care as per medicine, will follow w/ you  upon discharge f/u as outpatient at [facility] [address] [place] [address] [phone]  Seen w/ attng  and D/w team & RN  Thank you for this consult  Etelvina Hurt PA-C CWS [id number]  Nights/ Weekends/ Holidays please call:  General Surgery Consult pager ([phone]) for emergencies  Wound PT for multilayer leg wrapping or VAC issues (x [phone])   I spent 55  minutes face to face w/ this pt of which more than 50% of the time was spent counseling & coordinating care of this pt.  A/P: 28M with PMHx of polysubstance abuse presenting with right calf wound for several months which has been non-healing. He has seen vascular, wound care, and dermatology as an outpatient and was told it was due to vascular insufficiency. He has also been on multiple different antibiotics.     Wound Consult requested to assist w/ management of  RLE wound w/ cellulitis   Possible Necrotic Hematoma    RLE wound- pack w/ Dakins moistened saline  BLE elevation & Compression  Consider RLE MRI- concern for OM and extension of collection/ retained hematoma  Appreciate Derm r/o pyoderma  Abx per Medicine/ ID  Moisturize intact skin w/ SWEEN cream BID  Nutrition Consult for optimization        encourage high quality protein, mario/ prosource, MVI & Vit C to promote wound healing  Continue turning and positioning w/ offloading assistive devices as per protocol  Buttocks/ Sacrum Continue w/ attends under pads and Pericare as per protocol  Waffle Cushion to chair when oob to chair  Continue w/ low air loss pressure redistribution bed surface   Care as per medicine, will follow w/ you  upon discharge f/u as outpatient at [facility] [address] [place] [address] [phone]  Seen w/ attng  and D/w team & RN  Thank you for this consult  Etelvina Hurt PA-C CWS [id number]  Nights/ Weekends/ Holidays please call:  General Surgery Consult pager ([phone]) for emergencies  Wound PT for multilayer leg wrapping or VAC issues (x [phone])   I spent 55  minutes face to face w/ this pt of which more than 50% of the time was spent counseling & coordinating care of this pt.  A/P: 28M with PMHx of polysubstance abuse presenting with right calf wound for several months which has been non-healing. He has seen vascular, wound care, and dermatology as an outpatient and was told it was due to vascular insufficiency. He has also been on multiple different antibiotics.     Wound Consult requested to assist w/ management of  RLE wound w/ cellulitis   Possible Necrotic Hematoma    RLE wound- pack w/ Dakins moistened saline  BLE elevation & Compression  Consider RLE MRI- concern for OM and extension of collection/ retained hematoma  Appreciate Derm r/o pyoderma  Abx per Medicine/ ID  Moisturize intact skin w/ SWEEN cream BID  Nutrition Consult for optimization        encourage high quality protein, mario/ prosource, MVI & Vit C to promote wound healing  Continue turning and positioning w/ offloading assistive devices as per protocol  Buttocks/ Sacrum Continue w/ attends under pads and Pericare as per protocol  Waffle Cushion to chair when oob to chair  Continue w/ low air loss pressure redistribution bed surface   Care as per medicine, will follow w/ you  upon discharge f/u as outpatient at [facility] [address] [place] [address] [phone]  Seen w/ attng  and D/w team & RN  Thank you for this consult  Etelvina Hurt PA-C CWS [id number]  Nights/ Weekends/ Holidays please call:  General Surgery Consult pager ([phone]) for emergencies  Wound PT for multilayer leg wrapping or VAC issues (x [phone])   I spent 55  minutes face to face w/ this pt of which more than 50% of the time was spent counseling & coordinating care of this pt.  A/P: 28M with PMHx of polysubstance abuse presenting with right calf wound for several months which has been non-healing. He has seen vascular, wound care, and dermatology as an outpatient and was told it was due to vascular insufficiency. He has also been on multiple different antibiotics.     Wound Consult requested to assist w/ management of  RLE wound w/ cellulitis   Possible Necrotic Hematoma    RLE wound- pack w/ Dakins moistened saline  BLE elevation & Compression  Consider RLE MRI- concern for OM and extension of collection/ retained hematoma  Appreciate Derm r/o pyoderma  Abx per Medicine/ ID  Moisturize intact skin w/ SWEEN cream BID  Nutrition Consult for optimization        encourage high quality protein, mario/ prosource, MVI & Vit C to promote wound healing  Continue turning and positioning w/ offloading assistive devices as per protocol  Buttocks/ Sacrum Continue w/ attends under pads and Pericare as per protocol  Waffle Cushion to chair when oob to chair  Continue w/ low air loss pressure redistribution bed surface   Care as per medicine, will follow w/ you  upon discharge f/u as outpatient at [facility] [address] [place] [address] [phone]  Seen w/ attng  and D/w team & RN  Thank you for this consult  Etelvina Hurt PA-C CWS [id number]  Nights/ Weekends/ Holidays please call:  General Surgery Consult pager ([phone]) for emergencies  Wound PT for multilayer leg wrapping or VAC issues (x [phone])   I spent 55  minutes face to face w/ this pt of which more than 50% of the time was spent counseling & coordinating care of this pt.  A/P: 28M with PMHx of polysubstance abuse presenting with right calf wound for several months which has been non-healing. He has seen vascular, wound care, and dermatology as an outpatient and was told it was due to vascular insufficiency. He has also been on multiple different antibiotics.     Wound Consult requested to assist w/ management of:  RLE wound w/ cellulitis   Possible Necrotic Hematoma    RLE wound- pack w/ Dakins moistened saline  BLE elevation & Compression  Consider RLE MRI- concern for OM and extension of collection/ retained hematoma  Appreciate Derm r/o pyoderma  Abx per Medicine/ ID  Moisturize intact skin w/ SWEEN cream BID  Nutrition Consult for optimization        encourage high quality protein, mario/ prosource, MVI & Vit C to promote wound healing  Continue turning and positioning w/ offloading assistive devices as per protocol  Buttocks/ Sacrum Continue w/ attends under pads and Pericare as per protocol  Waffle Cushion to chair when oob to chair  Continue w/ low air loss pressure redistribution bed surface   Care as per medicine, will follow w/ you  upon discharge f/u as outpatient at [facility] [address] [place] [address] [phone]  Seen w/ attng  and D/w team & RN  Thank you for this consult  Etelvina Hurt PA-C CWS [id number]  Nights/ Weekends/ Holidays please call:  General Surgery Consult pager ([phone]) for emergencies  Wound PT for multilayer leg wrapping or VAC issues (x [phone])    A/P: 28M with PMHx of polysubstance abuse presenting with right calf wound for several months which has been non-healing. He has seen vascular, wound care, and dermatology as an outpatient and was told it was due to vascular insufficiency. He has also been on multiple different antibiotics.     Wound Consult requested to assist w/ management of:  RLE wound w/ cellulitis   Possible Necrotic Hematoma    RLE wound- pack w/ Dakins moistened saline  BLE elevation & Compression  Consider RLE MRI- concern for OM and extension of collection/ retained hematoma  Appreciate Derm r/o pyoderma  Abx per Medicine/ ID  Moisturize intact skin w/ SWEEN cream BID  Nutrition Consult for optimization        encourage high quality protein, mario/ prosource, MVI & Vit C to promote wound healing  Continue turning and positioning w/ offloading assistive devices as per protocol  Buttocks/ Sacrum Continue w/ attends under pads and Pericare as per protocol  Waffle Cushion to chair when oob to chair  Continue w/ low air loss pressure redistribution bed surface   Care as per medicine, will follow w/ you  upon discharge f/u as outpatient at [facility] [address] [place] [address] [phone]  Seen w/ attng  and D/w team & RN  Thank you for this consult  Etelvina Hurt PA-C CWS [id number]  Nights/ Weekends/ Holidays please call:  General Surgery Consult pager ([phone]) for emergencies  Wound PT for multilayer leg wrapping or VAC issues (x [phone])    A/P: 28M with PMHx of polysubstance abuse presenting with right calf wound for several months which has been non-healing. He has seen vascular, wound care, and dermatology as an outpatient and was told it was due to vascular insufficiency. He has also been on multiple different antibiotics.     Wound Consult requested to assist w/ management of:  RLE wound w/ cellulitis   Possible Necrotic Hematoma    RLE wound- pack w/ Dakins moistened saline  BLE elevation & Compression  Consider RLE MRI- concern for OM and extension of collection/ retained hematoma  Appreciate Derm r/o pyoderma  Abx per Medicine/ ID  Moisturize intact skin w/ SWEEN cream BID  Nutrition Consult for optimization        encourage high quality protein, mario/ prosource, MVI & Vit C to promote wound healing  Continue turning and positioning w/ offloading assistive devices as per protocol  Buttocks/ Sacrum Continue w/ attends under pads and Pericare as per protocol  Waffle Cushion to chair when oob to chair  Continue w/ low air loss pressure redistribution bed surface   Care as per medicine, will follow w/ you  upon discharge f/u as outpatient at [facility] [address] [place] [address] [phone]  Seen w/ attng  and D/w team & RN  Thank you for this consult  Etelvina Hurt PA-C CWS [id number]  Nights/ Weekends/ Holidays please call:  General Surgery Consult pager ([phone]) for emergencies  Wound PT for multilayer leg wrapping or VAC issues (x [phone])

## 2023-11-08 NOTE — PROVIDER CONTACT NOTE (OTHER) - SITUATION
Patient results from right leg abscess culture, numerous gram (+) cocci in pairs, numerous gram (-) rods, and polymorphonuclear cells

## 2023-11-08 NOTE — PROGRESS NOTE ADULT - ASSESSMENT
28M with PMHx of polysubstance abuse presenting with right calf wound for several months which has been non-healing. He has seen vascular, wound care, and dermatology as an outpatient and was told it was due to vascular insufficiency. He has also been on multiple different antibiotics. Patient sent in by outside provide for possible infection.

## 2023-11-08 NOTE — PROGRESS NOTE ADULT - SUBJECTIVE AND OBJECTIVE BOX
[facility]  Division of Infectious Diseases  [phone]    Name: SIRENA ENG  Age: 28y  Gender: Male  MRN: [id number]    Interval History--  Notes reviewed.     Past Medical History--  Venous insufficiency        For details regarding the patient's social history, family history, and other miscellaneous elements, please refer the initial infectious diseases consultation and/or the admitting history and physical examination for this admission.    Allergies    No Known Drug Allergies  avocado (Hives; Swelling)  Kiwi (Hives; Swelling)    Intolerances        Medications--  Antibiotics:    Immunologic:    Other:  acetaminophen     Tablet .. PRN      Review of Systems--  A 10-point review of systems was obtained.     Pertinent positives and negatives--  Constitutional: No fevers. No Chills. No Rigors.   Cardiovascular: No chest pain. No palpitations.  Respiratory: No shortness of breath. No cough.  Gastrointestinal: No nausea or vomiting. No diarrhea or constipation.   Psychiatric: Pleasant. Appropriate affect.    Review of systems otherwise negative except as previously noted.    Physical Examination--  Vital Signs: T(F): 97.9 (11-08-23 @ 00:45), Max: 97.9 (11-08-23 @ 00:45)  HR: 102 (11-08-23 @ 00:45)  BP: 109/71 (11-08-23 @ 00:45)  RR: 18 (11-08-23 @ 00:45)  SpO2: 100% (11-08-23 @ 00:45)  Wt(kg): --  General: Nontoxic-appearing Male in no acute distress.  HEENT: AT/NC. PERRL. EOMI. Anicteric. Conjunctiva pink and moist. Oropharynx clear. Dentition fair.  Neck: Not rigid. No sense of mass.  Nodes: None palpable.  Lungs: Clear bilaterally without rales, wheezing or rhonchi  Heart: Regular rate and rhythm. No Murmur. No rub. No gallop. No palpable thrill.  Abdomen: Bowel sounds present and normoactive. Soft. Nondistended. Nontender. No sense of mass. No organomegaly.  Back: No spinal tenderness. No costovertebral angle tenderness.   Extremities: No cyanosis or clubbing. No edema.   Skin: Warm. Dry. Good turgor. No rash. No vasculitic stigmata.  Psychiatric: Appropriate affect and mood for situation.         Laboratory Studies--  CBC                        13.5   7.38  )-----------( 383      ( 08 Nov 2023 06:19 )             42.2       Chemistries  11-07    137  |  102  |  12  ----------------------------<  95  3.8   |  23  |  0.81    Ca    9.3      07 Nov 2023 14:43    TPro  7.5  /  Alb  4.4  /  TBili  0.1<L>  /  DBili  x   /  AST  17  /  ALT  16  /  AlkPhos  88  11-07      Culture Data    Culture - Abscess with Gram Stain (collected 07 Nov 2023 16:27)  Source: .Abscess Leg - Right  Gram Stain (08 Nov 2023 01:08):    No polymorphonuclear cells seen per low power field    Numerous Gram positive cocci in pairs seen per oil power field    Numerous Gram Negative Rods seen per oil power field             [facility]  Division of Infectious Diseases  [phone]    Name: SIRENA ENG  Age: 28y  Gender: Male  MRN: [id number]    Interval History--  Notes reviewed.     Past Medical History--  Venous insufficiency        For details regarding the patient's social history, family history, and other miscellaneous elements, please refer the initial infectious diseases consultation and/or the admitting history and physical examination for this admission.    Allergies    No Known Drug Allergies  avocado (Hives; Swelling)  Kiwi (Hives; Swelling)    Intolerances        Medications--  Antibiotics:    Immunologic:    Other:  acetaminophen     Tablet .. PRN      Review of Systems--  A 10-point review of systems was obtained.     Pertinent positives and negatives--  Constitutional: No fevers. No Chills. No Rigors.   Cardiovascular: No chest pain. No palpitations.  Respiratory: No shortness of breath. No cough.  Gastrointestinal: No nausea or vomiting. No diarrhea or constipation.   Psychiatric: Pleasant. Appropriate affect.    Review of systems otherwise negative except as previously noted.    Physical Examination--  Vital Signs: T(F): 97.9 (11-08-23 @ 00:45), Max: 97.9 (11-08-23 @ 00:45)  HR: 102 (11-08-23 @ 00:45)  BP: 109/71 (11-08-23 @ 00:45)  RR: 18 (11-08-23 @ 00:45)  SpO2: 100% (11-08-23 @ 00:45)  Wt(kg): --  General: Nontoxic-appearing Male in no acute distress.  HEENT: AT/NC. PERRL. EOMI. Anicteric. Conjunctiva pink and moist. Oropharynx clear. Dentition fair.  Neck: Not rigid. No sense of mass.  Nodes: None palpable.  Lungs: Clear bilaterally without rales, wheezing or rhonchi  Heart: Regular rate and rhythm. No Murmur. No rub. No gallop. No palpable thrill.  Abdomen: Bowel sounds present and normoactive. Soft. Nondistended. Nontender. No sense of mass. No organomegaly.  Back: No spinal tenderness. No costovertebral angle tenderness.   Extremities: No cyanosis or clubbing. No edema.   Skin: Warm. Dry. Good turgor. No rash. No vasculitic stigmata.  Psychiatric: Appropriate affect and mood for situation.         Laboratory Studies--  CBC                        13.5   7.38  )-----------( 383      ( 08 Nov 2023 06:19 )             42.2       Chemistries  11-07    137  |  102  |  12  ----------------------------<  95  3.8   |  23  |  0.81    Ca    9.3      07 Nov 2023 14:43    TPro  7.5  /  Alb  4.4  /  TBili  0.1<L>  /  DBili  x   /  AST  17  /  ALT  16  /  AlkPhos  88  11-07      Culture Data    Culture - Abscess with Gram Stain (collected 07 Nov 2023 16:27)  Source: .Abscess Leg - Right  Gram Stain (08 Nov 2023 01:08):    No polymorphonuclear cells seen per low power field    Numerous Gram positive cocci in pairs seen per oil power field    Numerous Gram Negative Rods seen per oil power field             [facility]  Division of Infectious Diseases  [phone]    Name: SIRENA ENG  Age: 28y  Gender: Male  MRN: [id number]    Interval History--  Notes reviewed.     Past Medical History--  Venous insufficiency        For details regarding the patient's social history, family history, and other miscellaneous elements, please refer the initial infectious diseases consultation and/or the admitting history and physical examination for this admission.    Allergies    No Known Drug Allergies  avocado (Hives; Swelling)  Kiwi (Hives; Swelling)    Intolerances        Medications--  Antibiotics:    Immunologic:    Other:  acetaminophen     Tablet .. PRN      Review of Systems--  A 10-point review of systems was obtained.     Pertinent positives and negatives--  Constitutional: No fevers. No Chills. No Rigors.   Cardiovascular: No chest pain. No palpitations.  Respiratory: No shortness of breath. No cough.  Gastrointestinal: No nausea or vomiting. No diarrhea or constipation.   Psychiatric: Pleasant. Appropriate affect.    Review of systems otherwise negative except as previously noted.    Physical Examination--  Vital Signs: T(F): 97.9 (11-08-23 @ 00:45), Max: 97.9 (11-08-23 @ 00:45)  HR: 102 (11-08-23 @ 00:45)  BP: 109/71 (11-08-23 @ 00:45)  RR: 18 (11-08-23 @ 00:45)  SpO2: 100% (11-08-23 @ 00:45)  Wt(kg): --  General: Nontoxic-appearing Male in no acute distress.  HEENT: AT/NC. PERRL. EOMI. Anicteric. Conjunctiva pink and moist. Oropharynx clear. Dentition fair.  Neck: Not rigid. No sense of mass.  Nodes: None palpable.  Lungs: Clear bilaterally without rales, wheezing or rhonchi  Heart: Regular rate and rhythm. No Murmur. No rub. No gallop. No palpable thrill.  Abdomen: Bowel sounds present and normoactive. Soft. Nondistended. Nontender. No sense of mass. No organomegaly.  Back: No spinal tenderness. No costovertebral angle tenderness.   Extremities: No cyanosis or clubbing. No edema.   Skin: Warm. Dry. Good turgor. No rash. No vasculitic stigmata.  Psychiatric: Appropriate affect and mood for situation.         Laboratory Studies--  CBC                        13.5   7.38  )-----------( 383      ( 08 Nov 2023 06:19 )             42.2       Chemistries  11-07    137  |  102  |  12  ----------------------------<  95  3.8   |  23  |  0.81    Ca    9.3      07 Nov 2023 14:43    TPro  7.5  /  Alb  4.4  /  TBili  0.1<L>  /  DBili  x   /  AST  17  /  ALT  16  /  AlkPhos  88  11-07      Culture Data    Culture - Abscess with Gram Stain (collected 07 Nov 2023 16:27)  Source: .Abscess Leg - Right  Gram Stain (08 Nov 2023 01:08):    No polymorphonuclear cells seen per low power field    Numerous Gram positive cocci in pairs seen per oil power field    Numerous Gram Negative Rods seen per oil power field             [facility]  Division of Infectious Diseases  [phone]    Name: SIRENA ENG  Age: 28y  Gender: Male  MRN: [id number]    Interval History--  Notes reviewed. Seen earlier today.  Feels about the same.  Denies any fevers chills or rigors.    Past Medical History--  Venous insufficiency        For details regarding the patient's social history, family history, and other miscellaneous elements, please refer the initial infectious diseases consultation and/or the admitting history and physical examination for this admission.    Allergies    No Known Drug Allergies  avocado (Hives; Swelling)  Kiwi (Hives; Swelling)    Intolerances        Medications--  Antibiotics:    Immunologic:    Other:  acetaminophen     Tablet .. PRN      Review of Systems--  A 10-point review of systems was obtained.   Review of systems otherwise unchanged compared to prior visit except as previously noted.    Physical Examination--  Vital Signs: T(F): 97.9 (11-08-23 @ 00:45), Max: 97.9 (11-08-23 @ 00:45)  HR: 102 (11-08-23 @ 00:45)  BP: 109/71 (11-08-23 @ 00:45)  RR: 18 (11-08-23 @ 00:45)  SpO2: 100% (11-08-23 @ 00:45)  Wt(kg): --  General: Nontoxic appearing man in no acute distress   Ext: Involved extremity: Right Lower--   Proximal lymphadenopathy: absent.   Lymphangitis: absent.   Tenderness: present., similar to yesterday  Calor: mild  Edema: 2+  Erythema: chronic violaceous skin changes       Intensity: uncahnged       Extent: unchanged       Blanching: non  Crepitus: absent.   Fluctuance: absent.   Devitalized tissue: absent.   Anesthesia: absent.   Bullae: absent.   Ulceration: unchanged, drier appearing  Drainage: none today  Odor: absent.   Pulses: 2+  Otherwise No cyanosis, clubbing or edema.    Laboratory Studies--  CBC                        13.5   7.38  )-----------( 383      ( 08 Nov 2023 06:19 )             42.2       Chemistries  11-07    137  |  102  |  12  ----------------------------<  95  3.8   |  23  |  0.81    Ca    9.3      07 Nov 2023 14:43    TPro  7.5  /  Alb  4.4  /  TBili  0.1<L>  /  DBili  x   /  AST  17  /  ALT  16  /  AlkPhos  88  11-07      Culture Data    Culture - Abscess with Gram Stain (collected 07 Nov 2023 16:27)  Source: .Abscess Leg - Right  Gram Stain (08 Nov 2023 01:08):    No polymorphonuclear cells seen per low power field    Numerous Gram positive cocci in pairs seen per oil power field    Numerous Gram Negative Rods seen per oil power field             [facility]  Division of Infectious Diseases  [phone]    Name: SIRENA ENG  Age: 28y  Gender: Male  MRN: [id number]    Interval History--  Notes reviewed. Seen earlier today.  Feels about the same.  Denies any fevers chills or rigors.    Past Medical History--  Venous insufficiency        For details regarding the patient's social history, family history, and other miscellaneous elements, please refer the initial infectious diseases consultation and/or the admitting history and physical examination for this admission.    Allergies    No Known Drug Allergies  avocado (Hives; Swelling)  Kiwi (Hives; Swelling)    Intolerances        Medications--  Antibiotics:    Immunologic:    Other:  acetaminophen     Tablet .. PRN      Review of Systems--  A 10-point review of systems was obtained.   Review of systems otherwise unchanged compared to prior visit except as previously noted.    Physical Examination--  Vital Signs: T(F): 97.9 (11-08-23 @ 00:45), Max: 97.9 (11-08-23 @ 00:45)  HR: 102 (11-08-23 @ 00:45)  BP: 109/71 (11-08-23 @ 00:45)  RR: 18 (11-08-23 @ 00:45)  SpO2: 100% (11-08-23 @ 00:45)  Wt(kg): --  General: Nontoxic appearing man in no acute distress   Ext: Involved extremity: Right Lower--   Proximal lymphadenopathy: absent.   Lymphangitis: absent.   Tenderness: present., similar to yesterday  Calor: mild  Edema: 2+  Erythema: chronic violaceous skin changes       Intensity: uncahnged       Extent: unchanged       Blanching: non  Crepitus: absent.   Fluctuance: absent.   Devitalized tissue: absent.   Anesthesia: absent.   Bullae: absent.   Ulceration: unchanged, drier appearing  Drainage: none today  Odor: absent.   Pulses: 2+  Otherwise No cyanosis, clubbing or edema.    Laboratory Studies--  CBC                        13.5   7.38  )-----------( 383      ( 08 Nov 2023 06:19 )             42.2       Chemistries  11-07    137  |  102  |  12  ----------------------------<  95  3.8   |  23  |  0.81    Ca    9.3      07 Nov 2023 14:43    TPro  7.5  /  Alb  4.4  /  TBili  0.1<L>  /  DBili  x   /  AST  17  /  ALT  16  /  AlkPhos  88  11-07      Culture Data    Culture - Abscess with Gram Stain (collected 07 Nov 2023 16:27)  Source: .Abscess Leg - Right  Gram Stain (08 Nov 2023 01:08):    No polymorphonuclear cells seen per low power field    Numerous Gram positive cocci in pairs seen per oil power field    Numerous Gram Negative Rods seen per oil power field             [facility]  Division of Infectious Diseases  [phone]    Name: SIRENA ENG  Age: 28y  Gender: Male  MRN: [id number]    Interval History--  Notes reviewed. Seen earlier today.  Feels about the same.  Denies any fevers chills or rigors.    Past Medical History--  Venous insufficiency        For details regarding the patient's social history, family history, and other miscellaneous elements, please refer the initial infectious diseases consultation and/or the admitting history and physical examination for this admission.    Allergies    No Known Drug Allergies  avocado (Hives; Swelling)  Kiwi (Hives; Swelling)    Intolerances        Medications--  Antibiotics:    Immunologic:    Other:  acetaminophen     Tablet .. PRN      Review of Systems--  A 10-point review of systems was obtained.   Review of systems otherwise unchanged compared to prior visit except as previously noted.    Physical Examination--  Vital Signs: T(F): 97.9 (11-08-23 @ 00:45), Max: 97.9 (11-08-23 @ 00:45)  HR: 102 (11-08-23 @ 00:45)  BP: 109/71 (11-08-23 @ 00:45)  RR: 18 (11-08-23 @ 00:45)  SpO2: 100% (11-08-23 @ 00:45)  Wt(kg): --  General: Nontoxic appearing man in no acute distress   Ext: Involved extremity: Right Lower--   Proximal lymphadenopathy: absent.   Lymphangitis: absent.   Tenderness: present., similar to yesterday  Calor: mild  Edema: 2+  Erythema: chronic violaceous skin changes       Intensity: uncahnged       Extent: unchanged       Blanching: non  Crepitus: absent.   Fluctuance: absent.   Devitalized tissue: absent.   Anesthesia: absent.   Bullae: absent.   Ulceration: unchanged, drier appearing  Drainage: none today  Odor: absent.   Pulses: 2+  Otherwise No cyanosis, clubbing or edema.    Laboratory Studies--  CBC                        13.5   7.38  )-----------( 383      ( 08 Nov 2023 06:19 )             42.2       Chemistries  11-07    137  |  102  |  12  ----------------------------<  95  3.8   |  23  |  0.81    Ca    9.3      07 Nov 2023 14:43    TPro  7.5  /  Alb  4.4  /  TBili  0.1<L>  /  DBili  x   /  AST  17  /  ALT  16  /  AlkPhos  88  11-07      Culture Data    Culture - Abscess with Gram Stain (collected 07 Nov 2023 16:27)  Source: .Abscess Leg - Right  Gram Stain (08 Nov 2023 01:08):    No polymorphonuclear cells seen per low power field    Numerous Gram positive cocci in pairs seen per oil power field    Numerous Gram Negative Rods seen per oil power field

## 2023-11-08 NOTE — CONSULT NOTE ADULT - SUBJECTIVE AND OBJECTIVE BOX
Wound SURGERY CONSULT NOTE    HPI:  CHIEF COMPLAINT: Patient is a 28y old  Male who presents with a chief complaint of wound.     HPI: 27 yo w reported h/o venous insufficiency presents with persistent and worsening leg wound that he has had for over a month. He states he has had discoloration of his right leg "for a while".  He suffered a puncture wound during an accident about a month ago, which has been worsening over the last month.      He has seen a vascular doctor several times in the last month and he was told he has vascular insufficiency. He was prescribed different antibiotics including doxycycline, cephalexin, mupirocin ointment, but he admits he has not been good about taking them as indicated. He denies fevers although endorses chills.  He denies numbness or tingling of the affected leg.      Allergies    No Known Drug Allergies  avocado (Hives; Swelling)  Kiwi (Hives; Swelling)    Home Medications: Antibiotics        PAST MEDICAL & SURGICAL HISTORY:  Venous insufficiency      [x ] Reviewed     Functional Assessment: [ x] Independent  [ ] Assistance  [ ] Total care  [ ] Non-ambulatory    SOCIAL HISTORY:  Residence: [ ] [facility]  [ ] SNF  [x ] Community  [ ] Substance abuse:     FAMILY HISTORY:  [ x] No pertinent family history in first degree relatives of cellulitis    REVIEW OF SYSTEMS:    CONSTITUTIONAL: No fever, weight loss, or fatigue  EYES: No eye pain, visual disturbances, or discharge  ENMT:  No difficulty hearing, tinnitus, vertigo; No sinus or throat pain  NECK: No pain or stiffness  BREASTS: No pain, masses, or nipple discharge  RESPIRATORY: No cough, wheezing, chills or hemoptysis; No shortness of breath  CARDIOVASCULAR: No chest pain, palpitations, dizziness, or leg swelling  GASTROINTESTINAL: No abdominal or epigastric pain. No nausea, vomiting, or hematemesis; No diarrhea or constipation. No melena or hematochezia.  GENITOURINARY: No dysuria, frequency, hematuria, or incontinence  NEUROLOGICAL: No headaches, memory loss, loss of strength, numbness, or tremors  SKIN: Cellulitis  LYMPH NODES: No enlarged glands  ENDOCRINE: No heat or cold intolerance; No hair loss  MUSCULOSKELETAL: No muscle or back pain  PSYCHIATRIC: No depression, anxiety, mood swings, or difficulty sleeping  HEME/LYMPH: No easy bruising, or bleeding gums  ALLERGY AND IMMUNOLOGIC: No hives or eczema    [x  ] All other ROS negative  [  ] Unable to obtain due to poor mental status    PHYSICAL EXAM:    Vital Signs Last 24 Hrs  T(C): 36.5 (07 Nov 2023 11:49), Max: 36.8 (07 Nov 2023 08:17)  T(F): 97.7 (07 Nov 2023 11:49), Max: 98.2 (07 Nov 2023 08:17)  HR: 74 (07 Nov 2023 11:49) (74 - 97)  BP: 147/78 (07 Nov 2023 11:49) (142/89 - 163/102)  BP(mean): --  RR: 18 (07 Nov 2023 11:49) (17 - 18)  SpO2: 100% (07 Nov 2023 11:49) (98% - 100%)    Parameters below as of 07 Nov 2023 11:49  Patient On (Oxygen Delivery Method): room air        CONSTITUTIONAL: Well-groomed, in no apparent distress  EYES: No conjunctival or scleral injection, non-icteric; PERRLA and symmetric  ENMT: No external nasal lesions; nasal mucosa not inflamed; normal dentition; no pharyngeal injection or exudates, oral mucosa with moist membranes  NECK: Trachea midline without palpable neck mass; thyroid not enlarged and non-tender  RESPIRATORY: Breathing comfortably; no dullness to percussion; lungs CTA without wheeze/rhonchi/rales  CARDIOVASCULAR: +S1S2, RRR, no M/G/R; no carotid bruits; pedal pulses full and symmetric; no lower extremity edema  CHEST/BREAST: Breasts are symmetric in appearance; no palpable masses or lumps  GASTROINTESTINAL: No palpable masses or tenderness, +BS throughout, no rebound/guarding; no hepatosplenomegaly; no hernia palpated  LYMPHATIC: No cervical LAD or tenderness; no axillary LAD or tenderness; no inguinal LAD or tenderness  MUSCULOSKELETAL: Normal gait and station; no digital clubbing or cyanosis; no paraspinal tenderness; examination of the (head/neck, spine/ribs/pelvis, RUE, LUE, RLE, LLE) without misalignment, normal strength and tone of extremities  SKIN: Cellulitis  NEUROLOGIC: CN II-XII intact; normal reflexes in upper and lower extremities; sensation intact in LEs b/l to light touch  PSYCHIATRIC: A+O x 3; mood and affect appropriate; appropriate insight and judgment    LABS:                        13.7   10.51 )-----------( 402      ( 07 Nov 2023 05:25 )             43.8     Hemoglobin: 13.7 g/dL (11-07 @ 05:25)    11-07    137  |  101  |  17  ----------------------------<  96  3.9   |  18<L>  |  0.92    Ca    9.5      07 Nov 2023 05:25    TPro  7.5  /  Alb  4.4  /  TBili  0.1<L>  /  DBili  x   /  AST  17  /  ALT  16  /  AlkPhos  88  11-07      Urinalysis Basic - ( 07 Nov 2023 05:25 )    Color: x / Appearance: x / SG: x / pH: x  Gluc: 96 mg/dL / Ketone: x  / Bili: x / Urobili: x   Blood: x / Protein: x / Nitrite: x   Leuk Esterase: x / RBC: x / WBC x   Sq Epi: x / Non Sq Epi: x / Bacteria: x                      [ ] Consultant(s) Notes Reviewed  [x] Care Discussed with Consultants/Other Providers: NP     (07 Nov 2023 13:16)        N/V/D,  BM/ Flatus,   NGT,     palp/ sob/dyspnea/ cp,       F/C/S  Wound consult requested by team to assist w/ management of      wound/ pressure injury.   Pt (unable to)  c/o pain, drainage, odor, color change,  or worsening swelling. Offloading and pericare initiated upon admission as pt Increasingly sedentary 2/2 to illness. Pt is Incontinent of urine & stool. (+)wilks/ ostomy.   No h/o bites, scratches, falls, trauma.  Pt seen by Wound RN  [facility] [employer]/  [place],TRIAD/ Desireeell/ medihoney/ Allevyn foam/ dakins/ Adaptic/ DSD recommended used at home/ while awaiting consult.  Appetite good/ decreased.  weight loss.  S&S / RD consult appreciated All questions asked and answered to pt's and family's expressed understanding and satisfaction.    Current Diet: Diet, Regular (11-07-23 @ 13:49)      PAST MEDICAL & SURGICAL HISTORY:  Venous insufficiency          REVIEW OF SYSTEMS: Pt unable to offer  General/ Breast/ Skin/Vasc/ Neuro/ MSK: see HPI  All other systems negative    MEDICATIONS  (STANDING):  chlorhexidine 2% Cloths 1 Application(s) Topical daily    MEDICATIONS  (PRN):  acetaminophen     Tablet .. 650 milliGRAM(s) Oral every 6 hours PRN Temp greater or equal to 38C (100.4F), Mild Pain (1 - 3), Moderate Pain (4 - 6), Severe Pain (7 - 10)      Allergies    No Known Drug Allergies  avocado (Hives; Swelling)  Kiwi (Hives; Swelling)    Intolerances        SOCIAL HISTORY:  / /single/ ; (+)HHA/ lives in SNF; Former smoker, No current/ Denies smoking, ETOH, drugs    FAMILY HISTORY:   no h/o PVD or wound healing or skin/ significant problems    PHYSICAL EXAM:  Vital Signs Last 24 Hrs  T(C): 36.6 (08 Nov 2023 17:20), Max: 36.6 (08 Nov 2023 00:45)  T(F): 97.8 (08 Nov 2023 17:20), Max: 97.9 (08 Nov 2023 00:45)  HR: 89 (08 Nov 2023 17:20) (82 - 102)  BP: 120/71 (08 Nov 2023 17:20) (109/71 - 123/71)  BP(mean): --  RR: 18 (08 Nov 2023 17:20) (18 - 18)  SpO2: 100% (08 Nov 2023 17:20) (99% - 100%)    Parameters below as of 08 Nov 2023 17:20  Patient On (Oxygen Delivery Method): room air        NAD, Guarded but stable,  A&Ox3/ Alert/ Confused  cachectic/ thin, MO/ Obese, frail,  WD/ WN/ WG,  Disheveled  Total Care Sport/ Versa Care P500 / Envella Progressa bed     HEENT:  NC/AT, PERRL, EOMI, sclera clear, mucosa moist, throat clear, trachea midline, neck supple, trach  Respiratory: nonlabored w/ equal chest rise  Gastrointestinal: soft NT/ND (+)BS  (+)PEG (+)ostomy (+)NGT  : (+)wilks/ purewick/ condom cath  Neurology:  weakened strength & sensation grossly intact, paraesthesia  nonverbal, no follow commands, paraplegic  Psych: calm/ appropriate/ flat affect/ easily agitated/ restless/ anxious/ difficult to assess  Musculoskeletal:  limited stiff / p/FROM, no deformities/ contractures  Vascular: BLE equally warm/ cool,  no cyanosis, clubbing, edema nor acute ischemia           >LE //BLE edema equal           BLE DP/PT pulses palpable          BLE hemosiderin staining/ varicose veins  Skin:  moist w/ good turgor  thin, dry, pale, frail,  ecchymosis w/o hematoma  blistering  or serosanguinous drainage  No odor, erythema, increased warmth, tenderness, induration, fluctuance, nor crepitus    LABS/ CULTURES/ RADIOLOGY:                        13.5   7.38  )-----------( 383      ( 08 Nov 2023 06:19 )             42.2       137  |  102  |  12  ----------------------------<  95      [11-07-23 @ 14:43]  3.8   |  23  |  0.81        Ca     9.3     [11-07-23 @ 14:43]    TPro  7.5  /  Alb  4.4  /  TBili  0.1  /  DBili  x   /  AST  17  /  ALT  16  /  AlkPhos  88  [11-07-23 @ 05:25]                  Culture - Blood (collected 11-07-23 @ 04:30)  Source: .Blood Blood-Peripheral  Preliminary Report (11-08-23 @ 12:01):    No growth at 24 hours    Culture - Blood (collected 11-07-23 @ 04:15)  Source: .Blood Blood-Peripheral  Preliminary Report (11-08-23 @ 12:01):    No growth at 24 hours                         Wound SURGERY CONSULT NOTE    HPI:  CHIEF COMPLAINT: Patient is a 28y old  Male who presents with a chief complaint of wound.     HPI: 27 yo w reported h/o venous insufficiency presents with persistent and worsening leg wound that he has had for over a month. He states he has had discoloration of his right leg "for a while".  He suffered a puncture wound during an accident about a month ago, which has been worsening over the last month.      He has seen a vascular doctor several times in the last month and he was told he has vascular insufficiency. He was prescribed different antibiotics including doxycycline, cephalexin, mupirocin ointment, but he admits he has not been good about taking them as indicated. He denies fevers although endorses chills.  He denies numbness or tingling of the affected leg.      Allergies    No Known Drug Allergies  avocado (Hives; Swelling)  Kiwi (Hives; Swelling)    Home Medications: Antibiotics        PAST MEDICAL & SURGICAL HISTORY:  Venous insufficiency      [x ] Reviewed     Functional Assessment: [ x] Independent  [ ] Assistance  [ ] Total care  [ ] Non-ambulatory    SOCIAL HISTORY:  Residence: [ ] [facility]  [ ] SNF  [x ] Community  [ ] Substance abuse:     FAMILY HISTORY:  [ x] No pertinent family history in first degree relatives of cellulitis    REVIEW OF SYSTEMS:    CONSTITUTIONAL: No fever, weight loss, or fatigue  EYES: No eye pain, visual disturbances, or discharge  ENMT:  No difficulty hearing, tinnitus, vertigo; No sinus or throat pain  NECK: No pain or stiffness  BREASTS: No pain, masses, or nipple discharge  RESPIRATORY: No cough, wheezing, chills or hemoptysis; No shortness of breath  CARDIOVASCULAR: No chest pain, palpitations, dizziness, or leg swelling  GASTROINTESTINAL: No abdominal or epigastric pain. No nausea, vomiting, or hematemesis; No diarrhea or constipation. No melena or hematochezia.  GENITOURINARY: No dysuria, frequency, hematuria, or incontinence  NEUROLOGICAL: No headaches, memory loss, loss of strength, numbness, or tremors  SKIN: Cellulitis  LYMPH NODES: No enlarged glands  ENDOCRINE: No heat or cold intolerance; No hair loss  MUSCULOSKELETAL: No muscle or back pain  PSYCHIATRIC: No depression, anxiety, mood swings, or difficulty sleeping  HEME/LYMPH: No easy bruising, or bleeding gums  ALLERGY AND IMMUNOLOGIC: No hives or eczema    [x  ] All other ROS negative  [  ] Unable to obtain due to poor mental status    PHYSICAL EXAM:    Vital Signs Last 24 Hrs  T(C): 36.5 (07 Nov 2023 11:49), Max: 36.8 (07 Nov 2023 08:17)  T(F): 97.7 (07 Nov 2023 11:49), Max: 98.2 (07 Nov 2023 08:17)  HR: 74 (07 Nov 2023 11:49) (74 - 97)  BP: 147/78 (07 Nov 2023 11:49) (142/89 - 163/102)  BP(mean): --  RR: 18 (07 Nov 2023 11:49) (17 - 18)  SpO2: 100% (07 Nov 2023 11:49) (98% - 100%)    Parameters below as of 07 Nov 2023 11:49  Patient On (Oxygen Delivery Method): room air        CONSTITUTIONAL: Well-groomed, in no apparent distress  EYES: No conjunctival or scleral injection, non-icteric; PERRLA and symmetric  ENMT: No external nasal lesions; nasal mucosa not inflamed; normal dentition; no pharyngeal injection or exudates, oral mucosa with moist membranes  NECK: Trachea midline without palpable neck mass; thyroid not enlarged and non-tender  RESPIRATORY: Breathing comfortably; no dullness to percussion; lungs CTA without wheeze/rhonchi/rales  CARDIOVASCULAR: +S1S2, RRR, no M/G/R; no carotid bruits; pedal pulses full and symmetric; no lower extremity edema  CHEST/BREAST: Breasts are symmetric in appearance; no palpable masses or lumps  GASTROINTESTINAL: No palpable masses or tenderness, +BS throughout, no rebound/guarding; no hepatosplenomegaly; no hernia palpated  LYMPHATIC: No cervical LAD or tenderness; no axillary LAD or tenderness; no inguinal LAD or tenderness  MUSCULOSKELETAL: Normal gait and station; no digital clubbing or cyanosis; no paraspinal tenderness; examination of the (head/neck, spine/ribs/pelvis, RUE, LUE, RLE, LLE) without misalignment, normal strength and tone of extremities  SKIN: Cellulitis  NEUROLOGIC: CN II-XII intact; normal reflexes in upper and lower extremities; sensation intact in LEs b/l to light touch  PSYCHIATRIC: A+O x 3; mood and affect appropriate; appropriate insight and judgment    LABS:                        13.7   10.51 )-----------( 402      ( 07 Nov 2023 05:25 )             43.8     Hemoglobin: 13.7 g/dL (11-07 @ 05:25)    11-07    137  |  101  |  17  ----------------------------<  96  3.9   |  18<L>  |  0.92    Ca    9.5      07 Nov 2023 05:25    TPro  7.5  /  Alb  4.4  /  TBili  0.1<L>  /  DBili  x   /  AST  17  /  ALT  16  /  AlkPhos  88  11-07      Urinalysis Basic - ( 07 Nov 2023 05:25 )    Color: x / Appearance: x / SG: x / pH: x  Gluc: 96 mg/dL / Ketone: x  / Bili: x / Urobili: x   Blood: x / Protein: x / Nitrite: x   Leuk Esterase: x / RBC: x / WBC x   Sq Epi: x / Non Sq Epi: x / Bacteria: x                      [ ] Consultant(s) Notes Reviewed  [x] Care Discussed with Consultants/Other Providers: NP     (07 Nov 2023 13:16)        N/V/D,  BM/ Flatus,   NGT,     palp/ sob/dyspnea/ cp,       F/C/S  Wound consult requested by team to assist w/ management of      wound/ pressure injury.   Pt (unable to)  c/o pain, drainage, odor, color change,  or worsening swelling. Offloading and pericare initiated upon admission as pt Increasingly sedentary 2/2 to illness. Pt is Incontinent of urine & stool. (+)wilks/ ostomy.   No h/o bites, scratches, falls, trauma.  Pt seen by Wound RN  [facility]/  [place],TRIAD/ Desireeell/ medihoney/ Allevyn foam/ dakins/ Adaptic/ DSD recommended used at home/ while awaiting consult.  Appetite good/ decreased.  weight loss.  S&S / RD consult appreciated All questions asked and answered to pt's and family's expressed understanding and satisfaction.    Current Diet: Diet, Regular (11-07-23 @ 13:49)      PAST MEDICAL & SURGICAL HISTORY:  Venous insufficiency          REVIEW OF SYSTEMS: Pt unable to offer  General/ Breast/ Skin/Vasc/ Neuro/ MSK: see HPI  All other systems negative    MEDICATIONS  (STANDING):  chlorhexidine 2% Cloths 1 Application(s) Topical daily    MEDICATIONS  (PRN):  acetaminophen     Tablet .. 650 milliGRAM(s) Oral every 6 hours PRN Temp greater or equal to 38C (100.4F), Mild Pain (1 - 3), Moderate Pain (4 - 6), Severe Pain (7 - 10)      Allergies    No Known Drug Allergies  avocado (Hives; Swelling)  Kiwi (Hives; Swelling)    Intolerances        SOCIAL HISTORY:  / /single/ ; (+)HHA/ lives in SNF; Former smoker, No current/ Denies smoking, ETOH, drugs    FAMILY HISTORY:   no h/o PVD or wound healing or skin/ significant problems    PHYSICAL EXAM:  Vital Signs Last 24 Hrs  T(C): 36.6 (08 Nov 2023 17:20), Max: 36.6 (08 Nov 2023 00:45)  T(F): 97.8 (08 Nov 2023 17:20), Max: 97.9 (08 Nov 2023 00:45)  HR: 89 (08 Nov 2023 17:20) (82 - 102)  BP: 120/71 (08 Nov 2023 17:20) (109/71 - 123/71)  BP(mean): --  RR: 18 (08 Nov 2023 17:20) (18 - 18)  SpO2: 100% (08 Nov 2023 17:20) (99% - 100%)    Parameters below as of 08 Nov 2023 17:20  Patient On (Oxygen Delivery Method): room air        NAD, Guarded but stable,  A&Ox3/ Alert/ Confused  cachectic/ thin, MO/ Obese, frail,  WD/ WN/ WG,  Disheveled  Total Care Sport/ Versa Care P500 / Envella Progressa bed     HEENT:  NC/AT, PERRL, EOMI, sclera clear, mucosa moist, throat clear, trachea midline, neck supple, trach  Respiratory: nonlabored w/ equal chest rise  Gastrointestinal: soft NT/ND (+)BS  (+)PEG (+)ostomy (+)NGT  : (+)wilks/ purewick/ condom cath  Neurology:  weakened strength & sensation grossly intact, paraesthesia  nonverbal, no follow commands, paraplegic  Psych: calm/ appropriate/ flat affect/ easily agitated/ restless/ anxious/ difficult to assess  Musculoskeletal:  limited stiff / p/FROM, no deformities/ contractures  Vascular: BLE equally warm/ cool,  no cyanosis, clubbing, edema nor acute ischemia           >LE //BLE edema equal           BLE DP/PT pulses palpable          BLE hemosiderin staining/ varicose veins  Skin:  moist w/ good turgor  thin, dry, pale, frail,  ecchymosis w/o hematoma  blistering  or serosanguinous drainage  No odor, erythema, increased warmth, tenderness, induration, fluctuance, nor crepitus    LABS/ CULTURES/ RADIOLOGY:                        13.5   7.38  )-----------( 383      ( 08 Nov 2023 06:19 )             42.2       137  |  102  |  12  ----------------------------<  95      [11-07-23 @ 14:43]  3.8   |  23  |  0.81        Ca     9.3     [11-07-23 @ 14:43]    TPro  7.5  /  Alb  4.4  /  TBili  0.1  /  DBili  x   /  AST  17  /  ALT  16  /  AlkPhos  88  [11-07-23 @ 05:25]                  Culture - Blood (collected 11-07-23 @ 04:30)  Source: .Blood Blood-Peripheral  Preliminary Report (11-08-23 @ 12:01):    No growth at 24 hours    Culture - Blood (collected 11-07-23 @ 04:15)  Source: .Blood Blood-Peripheral  Preliminary Report (11-08-23 @ 12:01):    No growth at 24 hours                         Wound SURGERY CONSULT NOTE    HPI:  CHIEF COMPLAINT: Patient is a 28y old  Male who presents with a chief complaint of wound.     HPI: 27 yo w reported h/o venous insufficiency presents with persistent and worsening leg wound that he has had for over a month. He states he has had discoloration of his right leg "for a while".  He suffered a puncture wound during an accident about a month ago, which has been worsening over the last month.      He has seen a vascular doctor several times in the last month and he was told he has vascular insufficiency. He was prescribed different antibiotics including doxycycline, cephalexin, mupirocin ointment, but he admits he has not been good about taking them as indicated. He denies fevers although endorses chills.  He denies numbness or tingling of the affected leg.      Allergies    No Known Drug Allergies  avocado (Hives; Swelling)  Kiwi (Hives; Swelling)    Home Medications: Antibiotics        PAST MEDICAL & SURGICAL HISTORY:  Venous insufficiency      [x ] Reviewed     Functional Assessment: [ x] Independent  [ ] Assistance  [ ] Total care  [ ] Non-ambulatory    SOCIAL HISTORY:  Residence: [ ] [facility]  [ ] SNF  [x ] Community  [ ] Substance abuse:     FAMILY HISTORY:  [ x] No pertinent family history in first degree relatives of cellulitis    REVIEW OF SYSTEMS:    CONSTITUTIONAL: No fever, weight loss, or fatigue  EYES: No eye pain, visual disturbances, or discharge  ENMT:  No difficulty hearing, tinnitus, vertigo; No sinus or throat pain  NECK: No pain or stiffness  BREASTS: No pain, masses, or nipple discharge  RESPIRATORY: No cough, wheezing, chills or hemoptysis; No shortness of breath  CARDIOVASCULAR: No chest pain, palpitations, dizziness, or leg swelling  GASTROINTESTINAL: No abdominal or epigastric pain. No nausea, vomiting, or hematemesis; No diarrhea or constipation. No melena or hematochezia.  GENITOURINARY: No dysuria, frequency, hematuria, or incontinence  NEUROLOGICAL: No headaches, memory loss, loss of strength, numbness, or tremors  SKIN: Cellulitis  LYMPH NODES: No enlarged glands  ENDOCRINE: No heat or cold intolerance; No hair loss  MUSCULOSKELETAL: No muscle or back pain  PSYCHIATRIC: No depression, anxiety, mood swings, or difficulty sleeping  HEME/LYMPH: No easy bruising, or bleeding gums  ALLERGY AND IMMUNOLOGIC: No hives or eczema    [x  ] All other ROS negative  [  ] Unable to obtain due to poor mental status    PHYSICAL EXAM:    Vital Signs Last 24 Hrs  T(C): 36.5 (07 Nov 2023 11:49), Max: 36.8 (07 Nov 2023 08:17)  T(F): 97.7 (07 Nov 2023 11:49), Max: 98.2 (07 Nov 2023 08:17)  HR: 74 (07 Nov 2023 11:49) (74 - 97)  BP: 147/78 (07 Nov 2023 11:49) (142/89 - 163/102)  BP(mean): --  RR: 18 (07 Nov 2023 11:49) (17 - 18)  SpO2: 100% (07 Nov 2023 11:49) (98% - 100%)    Parameters below as of 07 Nov 2023 11:49  Patient On (Oxygen Delivery Method): room air        CONSTITUTIONAL: Well-groomed, in no apparent distress  EYES: No conjunctival or scleral injection, non-icteric; PERRLA and symmetric  ENMT: No external nasal lesions; nasal mucosa not inflamed; normal dentition; no pharyngeal injection or exudates, oral mucosa with moist membranes  NECK: Trachea midline without palpable neck mass; thyroid not enlarged and non-tender  RESPIRATORY: Breathing comfortably; no dullness to percussion; lungs CTA without wheeze/rhonchi/rales  CARDIOVASCULAR: +S1S2, RRR, no M/G/R; no carotid bruits; pedal pulses full and symmetric; no lower extremity edema  CHEST/BREAST: Breasts are symmetric in appearance; no palpable masses or lumps  GASTROINTESTINAL: No palpable masses or tenderness, +BS throughout, no rebound/guarding; no hepatosplenomegaly; no hernia palpated  LYMPHATIC: No cervical LAD or tenderness; no axillary LAD or tenderness; no inguinal LAD or tenderness  MUSCULOSKELETAL: Normal gait and station; no digital clubbing or cyanosis; no paraspinal tenderness; examination of the (head/neck, spine/ribs/pelvis, RUE, LUE, RLE, LLE) without misalignment, normal strength and tone of extremities  SKIN: Cellulitis  NEUROLOGIC: CN II-XII intact; normal reflexes in upper and lower extremities; sensation intact in LEs b/l to light touch  PSYCHIATRIC: A+O x 3; mood and affect appropriate; appropriate insight and judgment    LABS:                        13.7   10.51 )-----------( 402      ( 07 Nov 2023 05:25 )             43.8     Hemoglobin: 13.7 g/dL (11-07 @ 05:25)    11-07    137  |  101  |  17  ----------------------------<  96  3.9   |  18<L>  |  0.92    Ca    9.5      07 Nov 2023 05:25    TPro  7.5  /  Alb  4.4  /  TBili  0.1<L>  /  DBili  x   /  AST  17  /  ALT  16  /  AlkPhos  88  11-07      Urinalysis Basic - ( 07 Nov 2023 05:25 )    Color: x / Appearance: x / SG: x / pH: x  Gluc: 96 mg/dL / Ketone: x  / Bili: x / Urobili: x   Blood: x / Protein: x / Nitrite: x   Leuk Esterase: x / RBC: x / WBC x   Sq Epi: x / Non Sq Epi: x / Bacteria: x                      [ ] Consultant(s) Notes Reviewed  [x] Care Discussed with Consultants/Other Providers: NP     (07 Nov 2023 13:16)        N/V/D,  BM/ Flatus,   NGT,     palp/ sob/dyspnea/ cp,       F/C/S  Wound consult requested by team to assist w/ management of      wound/ pressure injury.   Pt (unable to)  c/o pain, drainage, odor, color change,  or worsening swelling. Offloading and pericare initiated upon admission as pt Increasingly sedentary 2/2 to illness. Pt is Incontinent of urine & stool. (+)wilks/ ostomy.   No h/o bites, scratches, falls, trauma.  Pt seen by Wound RN  [facility]/  [place],TRIAD/ Desireeell/ medihoney/ Allevyn foam/ dakins/ Adaptic/ DSD recommended used at home/ while awaiting consult.  Appetite good/ decreased.  weight loss.  S&S / RD consult appreciated All questions asked and answered to pt's and family's expressed understanding and satisfaction.    Current Diet: Diet, Regular (11-07-23 @ 13:49)      PAST MEDICAL & SURGICAL HISTORY:  Venous insufficiency          REVIEW OF SYSTEMS: Pt unable to offer  General/ Breast/ Skin/Vasc/ Neuro/ MSK: see HPI  All other systems negative    MEDICATIONS  (STANDING):  chlorhexidine 2% Cloths 1 Application(s) Topical daily    MEDICATIONS  (PRN):  acetaminophen     Tablet .. 650 milliGRAM(s) Oral every 6 hours PRN Temp

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
## 2023-11-08 NOTE — PROGRESS NOTE ADULT - SUBJECTIVE AND OBJECTIVE BOX
Patient is a 28y old  Male who presents with a chief complaint of Cellulitis (08 Nov 2023 10:16)      SUBJECTIVE / OVERNIGHT EVENTS: Patient seen and examined at bedside. No acute events overnight. Patient seen with ID attending. Right leg ulcer for the most part appears unchanged. No significant pain. +swelling. NO fever/chills.    MEDICATIONS  (STANDING):    MEDICATIONS  (PRN):  acetaminophen     Tablet .. 650 milliGRAM(s) Oral every 6 hours PRN Temp greater or equal to 38C (100.4F), Mild Pain (1 - 3), Moderate Pain (4 - 6), Severe Pain (7 - 10)      CAPILLARY BLOOD GLUCOSE        I&O's Summary      PHYSICAL EXAM:  Vital Signs Last 24 Hrs  T(C): 36.6 (08 Nov 2023 00:45), Max: 36.6 (08 Nov 2023 00:45)  T(F): 97.9 (08 Nov 2023 00:45), Max: 97.9 (08 Nov 2023 00:45)  HR: 102 (08 Nov 2023 00:45) (74 - 102)  BP: 109/71 (08 Nov 2023 00:45) (109/71 - 147/78)  BP(mean): --  RR: 18 (08 Nov 2023 00:45) (18 - 18)  SpO2: 100% (08 Nov 2023 00:45) (100% - 100%)    Parameters below as of 07 Nov 2023 11:49  Patient On (Oxygen Delivery Method): room air        GEN: male in NAD, appears comfortable, no diaphoresis  EYES: No scleral injection, EOMI  ENTM: neck supple & symmetric without tracheal deviation, moist membranes, no gross hearing impairment, thyroid gland not enlarged  CV: +S1/S2, no m/r/g, no abdominal bruit, +mild edema of right leg  RESP: breathing comfortably, no respiratory accessory muscle use, CTAB, no w/r/r  GI: normoactive BS, soft, NTND, no rebounding/guarding, no palpable masses    LABS:                        13.5   7.38  )-----------( 383      ( 08 Nov 2023 06:19 )             42.2     11-07    137  |  102  |  12  ----------------------------<  95  3.8   |  23  |  0.81    Ca    9.3      07 Nov 2023 14:43    TPro  7.5  /  Alb  4.4  /  TBili  0.1<L>  /  DBili  x   /  AST  17  /  ALT  16  /  AlkPhos  88  11-07          Urinalysis Basic - ( 07 Nov 2023 14:43 )    Color: x / Appearance: x / SG: x / pH: x  Gluc: 95 mg/dL / Ketone: x  / Bili: x / Urobili: x   Blood: x / Protein: x / Nitrite: x   Leuk Esterase: x / RBC: x / WBC x   Sq Epi: x / Non Sq Epi: x / Bacteria: x        Culture - Abscess with Gram Stain (collected 07 Nov 2023 16:27)  Source: .Abscess Leg - Right  Gram Stain (08 Nov 2023 01:08):    No polymorphonuclear cells seen per low power field    Numerous Gram positive cocci in pairs seen per oil power field    Numerous Gram Negative Rods seen per oil power field        RADIOLOGY & ADDITIONAL TESTS:  Results Reviewed:   Imaging Personally Reviewed:  Electrocardiogram Personally Reviewed:    COORDINATION OF CARE:  Care Discussed with Consultants/Other Providers [Y/N]:  Prior or Outpatient Records Reviewed [Y/N]:

## 2023-11-09 ENCOUNTER — TRANSCRIPTION ENCOUNTER (OUTPATIENT)
Age: 28
End: 2023-11-09

## 2023-11-09 LAB
-  AMOXICILLIN/CLAVULANIC ACID: SIGNIFICANT CHANGE UP
-  AMPICILLIN/SULBACTAM: SIGNIFICANT CHANGE UP
-  AMPICILLIN: SIGNIFICANT CHANGE UP
-  AZTREONAM: SIGNIFICANT CHANGE UP
-  CEFAZOLIN: SIGNIFICANT CHANGE UP
-  CEFEPIME: SIGNIFICANT CHANGE UP
-  CEFOXITIN: SIGNIFICANT CHANGE UP
-  CEFTRIAXONE: SIGNIFICANT CHANGE UP
-  CIPROFLOXACIN: SIGNIFICANT CHANGE UP
-  CLINDAMYCIN: SIGNIFICANT CHANGE UP
-  ERTAPENEM: SIGNIFICANT CHANGE UP
-  ERYTHROMYCIN: SIGNIFICANT CHANGE UP
-  GENTAMICIN: SIGNIFICANT CHANGE UP
-  LEVOFLOXACIN: SIGNIFICANT CHANGE UP
-  MEROPENEM: SIGNIFICANT CHANGE UP
-  OXACILLIN: SIGNIFICANT CHANGE UP
-  PENICILLIN: SIGNIFICANT CHANGE UP
-  PIPERACILLIN/TAZOBACTAM: SIGNIFICANT CHANGE UP
-  RIFAMPIN: SIGNIFICANT CHANGE UP
-  TETRACYCLINE: SIGNIFICANT CHANGE UP
-  TOBRAMYCIN: SIGNIFICANT CHANGE UP
-  TRIMETHOPRIM/SULFAMETHOXAZOLE: SIGNIFICANT CHANGE UP
-  VANCOMYCIN: SIGNIFICANT CHANGE UP
4/8 RATIO: 1.63 RATIO — SIGNIFICANT CHANGE UP (ref 0.9–3.6)
ABS CD8: 865 CELLS/UL — HIGH (ref 142–740)
ALBUMIN SERPL ELPH-MCNC: 3.9 G/DL — SIGNIFICANT CHANGE UP (ref 3.3–5)
ALP SERPL-CCNC: 78 U/L — SIGNIFICANT CHANGE UP (ref 40–120)
ALT FLD-CCNC: 14 U/L — SIGNIFICANT CHANGE UP (ref 10–45)
ANION GAP SERPL CALC-SCNC: 14 MMOL/L — SIGNIFICANT CHANGE UP (ref 5–17)
AST SERPL-CCNC: 11 U/L — SIGNIFICANT CHANGE UP (ref 10–40)
BILIRUB SERPL-MCNC: 0.1 MG/DL — LOW (ref 0.2–1.2)
BUN SERPL-MCNC: 15 MG/DL — SIGNIFICANT CHANGE UP (ref 7–23)
CALCIUM SERPL-MCNC: 9.3 MG/DL — SIGNIFICANT CHANGE UP (ref 8.4–10.5)
CD16+CD56+ CELLS NFR BLD: 7 % — SIGNIFICANT CHANGE UP (ref 5–23)
CD16+CD56+ CELLS NFR SPEC: 223 CELLS/UL — SIGNIFICANT CHANGE UP (ref 71–410)
CD19 BLASTS SPEC-ACNC: 19 % — SIGNIFICANT CHANGE UP (ref 6–24)
CD19 BLASTS SPEC-ACNC: 588 CELLS/UL — HIGH (ref 84–469)
CD3 BLASTS SPEC-ACNC: 2311 CELLS/UL — HIGH (ref 672–1870)
CD3 BLASTS SPEC-ACNC: 73 % — SIGNIFICANT CHANGE UP (ref 59–83)
CD4 %: 44 % — SIGNIFICANT CHANGE UP (ref 30–62)
CD8 %: 27 % — SIGNIFICANT CHANGE UP (ref 12–36)
CHLORIDE SERPL-SCNC: 105 MMOL/L — SIGNIFICANT CHANGE UP (ref 96–108)
CO2 SERPL-SCNC: 22 MMOL/L — SIGNIFICANT CHANGE UP (ref 22–31)
CREAT SERPL-MCNC: 0.85 MG/DL — SIGNIFICANT CHANGE UP (ref 0.5–1.3)
EGFR: 122 ML/MIN/1.73M2 — SIGNIFICANT CHANGE UP
GLUCOSE SERPL-MCNC: 92 MG/DL — SIGNIFICANT CHANGE UP (ref 70–99)
HCT VFR BLD CALC: 42.5 % — SIGNIFICANT CHANGE UP (ref 39–50)
HGB BLD-MCNC: 13.6 G/DL — SIGNIFICANT CHANGE UP (ref 13–17)
HIV 1+2 AB+HIV1 P24 AG SERPL QL IA: SIGNIFICANT CHANGE UP
IGA FLD-MCNC: 209 MG/DL — SIGNIFICANT CHANGE UP (ref 84–499)
IGG FLD-MCNC: 1215 MG/DL — SIGNIFICANT CHANGE UP (ref 610–1660)
IGG1 SER-MCNC: 732 MG/DL — SIGNIFICANT CHANGE UP (ref 240–1118)
IGG2 SER-MCNC: 358 MG/DL — SIGNIFICANT CHANGE UP (ref 124–549)
IGG3 SER-MCNC: 30.5 MG/DL — SIGNIFICANT CHANGE UP (ref 15–102)
IGG4 SER-MCNC: 48.4 MG/DL — SIGNIFICANT CHANGE UP (ref 1–123)
IGM SERPL-MCNC: 105 MG/DL — SIGNIFICANT CHANGE UP (ref 35–242)
KAPPA LC SER QL IFE: 1.94 MG/DL — SIGNIFICANT CHANGE UP (ref 0.33–1.94)
KAPPA/LAMBDA FREE LIGHT CHAIN RATIO, SERUM: 1.32 RATIO — SIGNIFICANT CHANGE UP (ref 0.26–1.65)
LAMBDA LC SER QL IFE: 1.47 MG/DL — SIGNIFICANT CHANGE UP (ref 0.57–2.63)
MCHC RBC-ENTMCNC: 28.2 PG — SIGNIFICANT CHANGE UP (ref 27–34)
MCHC RBC-ENTMCNC: 32 GM/DL — SIGNIFICANT CHANGE UP (ref 32–36)
MCV RBC AUTO: 88 FL — SIGNIFICANT CHANGE UP (ref 80–100)
METHOD TYPE: SIGNIFICANT CHANGE UP
NRBC # BLD: 0 /100 WBCS — SIGNIFICANT CHANGE UP (ref 0–0)
PLATELET # BLD AUTO: 361 K/UL — SIGNIFICANT CHANGE UP (ref 150–400)
POTASSIUM SERPL-MCNC: 3.9 MMOL/L — SIGNIFICANT CHANGE UP (ref 3.5–5.3)
POTASSIUM SERPL-SCNC: 3.9 MMOL/L — SIGNIFICANT CHANGE UP (ref 3.5–5.3)
PROT SERPL-MCNC: 6.8 G/DL — SIGNIFICANT CHANGE UP (ref 6–8.3)
RBC # BLD: 4.83 M/UL — SIGNIFICANT CHANGE UP (ref 4.2–5.8)
RBC # FLD: 13.1 % — SIGNIFICANT CHANGE UP (ref 10.3–14.5)
SODIUM SERPL-SCNC: 141 MMOL/L — SIGNIFICANT CHANGE UP (ref 135–145)
T-CELL CD4 SUBSET PNL BLD: 1407 CELLS/UL — SIGNIFICANT CHANGE UP (ref 489–1457)
WBC # BLD: 8.93 K/UL — SIGNIFICANT CHANGE UP (ref 3.8–10.5)
WBC # FLD AUTO: 8.93 K/UL — SIGNIFICANT CHANGE UP (ref 3.8–10.5)

## 2023-11-09 PROCEDURE — 99232 SBSQ HOSP IP/OBS MODERATE 35: CPT

## 2023-11-09 PROCEDURE — 74177 CT ABD & PELVIS W/CONTRAST: CPT | Mod: 26

## 2023-11-09 PROCEDURE — 73719 MRI LOWER EXTREMITY W/DYE: CPT | Mod: 26,RT

## 2023-11-09 RX ORDER — NICOTINE POLACRILEX 2 MG
1 GUM BUCCAL DAILY
Refills: 0 | Status: DISCONTINUED | OUTPATIENT
Start: 2023-11-09 | End: 2023-11-11

## 2023-11-09 RX ORDER — LANOLIN ALCOHOL/MO/W.PET/CERES
3 CREAM (GRAM) TOPICAL AT BEDTIME
Refills: 0 | Status: DISCONTINUED | OUTPATIENT
Start: 2023-11-09 | End: 2023-11-11

## 2023-11-09 RX ADMIN — Medication 3 MILLIGRAM(S): at 21:26

## 2023-11-09 RX ADMIN — Medication 1 EACH: at 21:28

## 2023-11-09 RX ADMIN — Medication 1 PATCH: at 06:50

## 2023-11-09 RX ADMIN — CHLORHEXIDINE GLUCONATE 1 APPLICATION(S): 213 SOLUTION TOPICAL at 11:29

## 2023-11-09 NOTE — DISCHARGE NOTE NURSING/CASE MANAGEMENT/SOCIAL WORK - NSDCPEWEB_GEN_ALL_CORE
[facility] for Tobacco Control website --- [email or web address] website --- [email or web address] [facility] for Tobacco Control website --- [email or web address]/quitsmoking/[place] website --- [email or web address] [email or web address] [facility] for Tobacco Control website --- [email or web address]://[email or web address]/quitsmoking/[place] website --- [email or web address] [email or web address]

## 2023-11-09 NOTE — DISCHARGE NOTE NURSING/CASE MANAGEMENT/SOCIAL WORK - NSSCCONTNUM_GEN_ALL_CORE
[phone] or [phone] or [phone] or [phone]

## 2023-11-09 NOTE — DISCHARGE NOTE NURSING/CASE MANAGEMENT/SOCIAL WORK - NSDCPEPAMP_GEN_ALL_CORE
“[facility] for Tobacco Control” pamphlet given “[facility] for Tobacco Control” pamphlet given “[facility] for Tobacco Control” pamphlet given

## 2023-11-09 NOTE — DISCHARGE NOTE NURSING/CASE MANAGEMENT/SOCIAL WORK - NSSCNAMETXT_GEN_ALL_CORE
[facility] AT HOME [facility] AT HOME [facility] AT HOME

## 2023-11-09 NOTE — DISCHARGE NOTE NURSING/CASE MANAGEMENT/SOCIAL WORK - NSDCPEHOTLINE_GEN_ALL_CORE
[facility] Smokers Quitline [phone] ([phone]) [facility] Smokers Quitline [phone] ([phone]) [facility] Smokers Quitline [phone] ([phone])

## 2023-11-09 NOTE — PROGRESS NOTE ADULT - SUBJECTIVE AND OBJECTIVE BOX
Patient is a 28y old  Male who presents with a chief complaint of Cellulitis (09 Nov 2023 04:40)      SUBJECTIVE / OVERNIGHT EVENTS: Patient seen and examined at bedside. No acute events overnight. Unchanged right leg ulceration. No fever/chills. Complaining of severe pain, but doesn't want pain medications. On exam unchanged ulceration, no drainage, unchanged hyperpigmented areas, some erythema    MEDICATIONS  (STANDING):  chlorhexidine 2% Cloths 1 Application(s) Topical daily  melatonin 3 milliGRAM(s) Oral at bedtime    MEDICATIONS  (PRN):  acetaminophen     Tablet .. 650 milliGRAM(s) Oral every 6 hours PRN Temp greater or equal to 38C (100.4F), Mild Pain (1 - 3), Moderate Pain (4 - 6), Severe Pain (7 - 10)      CAPILLARY BLOOD GLUCOSE        I&O's Summary    08 Nov 2023 07:01  -  09 Nov 2023 07:00  --------------------------------------------------------  IN: 240 mL / OUT: 3 mL / NET: 237 mL        PHYSICAL EXAM:  Vital Signs Last 24 Hrs  T(C): 37.1 (09 Nov 2023 05:10), Max: 37.1 (09 Nov 2023 05:10)  T(F): 98.7 (09 Nov 2023 05:10), Max: 98.7 (09 Nov 2023 05:10)  HR: 73 (09 Nov 2023 05:10) (73 - 89)  BP: 111/68 (09 Nov 2023 05:10) (111/68 - 123/71)  BP(mean): --  RR: 18 (09 Nov 2023 05:10) (18 - 18)  SpO2: 99% (09 Nov 2023 05:10) (99% - 100%)    Parameters below as of 09 Nov 2023 05:10  Patient On (Oxygen Delivery Method): room air        GEN: male in NAD, appears comfortable, no diaphoresis  EYES: No scleral injection, EOMI  ENTM: neck supple & symmetric without tracheal deviation, moist membranes, no gross hearing impairment, thyroid gland not enlarged  CV: +S1/S2, no m/r/g, no abdominal bruit, +right leg swelling, +pulses 2+  RESP: breathing comfortably, no respiratory accessory muscle use, CTAB, no w/r/r  GI: normoactive BS, soft, NTND, no rebounding/guarding, no palpable masses    LABS:                        13.6   8.93  )-----------( 361      ( 09 Nov 2023 07:28 )             42.5     11-09    141  |  105  |  15  ----------------------------<  92  3.9   |  22  |  0.85    Ca    9.3      09 Nov 2023 07:31    TPro  6.8  /  Alb  3.9  /  TBili  0.1<L>  /  DBili  x   /  AST  11  /  ALT  14  /  AlkPhos  78  11-09          Urinalysis Basic - ( 09 Nov 2023 07:31 )    Color: x / Appearance: x / SG: x / pH: x  Gluc: 92 mg/dL / Ketone: x  / Bili: x / Urobili: x   Blood: x / Protein: x / Nitrite: x   Leuk Esterase: x / RBC: x / WBC x   Sq Epi: x / Non Sq Epi: x / Bacteria: x        Culture - Abscess with Gram Stain (collected 07 Nov 2023 16:27)  Source: .Abscess Leg - Right  Gram Stain (08 Nov 2023 01:08):    No polymorphonuclear cells seen per low power field    Numerous Gram positive cocci in pairs seen per oil power field    Numerous Gram Negative Rods seen per oil power field  Preliminary Report (08 Nov 2023 22:15):    Moderate Staphylococcus aureus    Numerous Proteus mirabilis    Culture - Blood (collected 07 Nov 2023 04:30)  Source: .Blood Blood-Peripheral  Preliminary Report (08 Nov 2023 12:01):    No growth at 24 hours    Culture - Blood (collected 07 Nov 2023 04:15)  Source: .Blood Blood-Peripheral  Preliminary Report (08 Nov 2023 12:01):    No growth at 24 hours        RADIOLOGY & ADDITIONAL TESTS:  Results Reviewed:   Imaging Personally Reviewed:  Electrocardiogram Personally Reviewed:    COORDINATION OF CARE:  Care Discussed with Consultants/Other Providers [Y/N]:  Prior or Outpatient Records Reviewed [Y/N]:

## 2023-11-09 NOTE — DISCHARGE NOTE PROVIDER - NSDCFUADDAPPT_GEN_ALL_CORE_FT
Wound Care: upon discharge f/u as outpatient at [facility]ound [facility] [address] [facility] [phone] Wound Care: upon discharge f/u as outpatient at Wound Center [address] [facility] [phone] Wound Care: upon discharge f/u as outpatient at Wound Center [address] [facility] [phone]

## 2023-11-09 NOTE — PROGRESS NOTE ADULT - SUBJECTIVE AND OBJECTIVE BOX
[facility]  Division of Infectious Diseases  [phone]    Name: SIRENA ENG  Age: 28y  Gender: Male  MRN: [id number]    Interval History--  Notes reviewed.     Past Medical History--  Venous insufficiency        For details regarding the patient's social history, family history, and other miscellaneous elements, please refer the initial infectious diseases consultation and/or the admitting history and physical examination for this admission.    Allergies    No Known Drug Allergies  avocado (Hives; Swelling)  Kiwi (Hives; Swelling)    Intolerances        Medications--  Antibiotics:    Immunologic:    Other:  acetaminophen     Tablet .. PRN  chlorhexidine 2% Cloths  melatonin      Review of Systems--  A 10-point review of systems was obtained.     Pertinent positives and negatives--  Constitutional: No fevers. No Chills. No Rigors.   Cardiovascular: No chest pain. No palpitations.  Respiratory: No shortness of breath. No cough.  Gastrointestinal: No nausea or vomiting. No diarrhea or constipation.   Psychiatric: Pleasant. Appropriate affect.    Review of systems otherwise negative except as previously noted.    Physical Examination--  Vital Signs: T(F): 98.7 (11-09-23 @ 05:10), Max: 98.7 (11-09-23 @ 05:10)  HR: 73 (11-09-23 @ 05:10)  BP: 111/68 (11-09-23 @ 05:10)  RR: 18 (11-09-23 @ 05:10)  SpO2: 99% (11-09-23 @ 05:10)  Wt(kg): --  General: Nontoxic-appearing Male in no acute distress.  HEENT: AT/NC. PERRL. EOMI. Anicteric. Conjunctiva pink and moist. Oropharynx clear. Dentition fair.  Neck: Not rigid. No sense of mass.  Nodes: None palpable.  Lungs: Clear bilaterally without rales, wheezing or rhonchi  Heart: Regular rate and rhythm. No Murmur. No rub. No gallop. No palpable thrill.  Abdomen: Bowel sounds present and normoactive. Soft. Nondistended. Nontender. No sense of mass. No organomegaly.  Back: No spinal tenderness. No costovertebral angle tenderness.   Extremities: No cyanosis or clubbing. No edema.   Skin: Warm. Dry. Good turgor. No rash. No vasculitic stigmata.  Psychiatric: Appropriate affect and mood for situation.         Laboratory Studies--  CBC                        13.6   8.93  )-----------( 361      ( 09 Nov 2023 07:28 )             42.5       Chemistries  11-09    141  |  105  |  15  ----------------------------<  92  3.9   |  22  |  0.85    Ca    9.3      09 Nov 2023 07:31    TPro  6.8  /  Alb  3.9  /  TBili  0.1<L>  /  DBili  x   /  AST  11  /  ALT  14  /  AlkPhos  78  11-09      Culture Data    Culture - Abscess with Gram Stain (collected 07 Nov 2023 16:27)  Source: .Abscess Leg - Right  Gram Stain (08 Nov 2023 01:08):    No polymorphonuclear cells seen per low power field    Numerous Gram positive cocci in pairs seen per oil power field    Numerous Gram Negative Rods seen per oil power field  Preliminary Report (08 Nov 2023 22:15):    Moderate Staphylococcus aureus    Numerous Proteus mirabilis    Culture - Blood (collected 07 Nov 2023 04:30)  Source: .Blood Blood-Peripheral  Preliminary Report (08 Nov 2023 12:01):    No growth at 24 hours    Culture - Blood (collected 07 Nov 2023 04:15)  Source: .Blood Blood-Peripheral  Preliminary Report (08 Nov 2023 12:01):    No growth at 24 hours             [facility]  Division of Infectious Diseases  [phone]    Name: SIRENA ENG  Age: 28y  Gender: Male  MRN: [id number]    Interval History--  Notes reviewed.     Past Medical History--  Venous insufficiency        For details regarding the patient's social history, family history, and other miscellaneous elements, please refer the initial infectious diseases consultation and/or the admitting history and physical examination for this admission.    Allergies    No Known Drug Allergies  avocado (Hives; Swelling)  Kiwi (Hives; Swelling)    Intolerances        Medications--  Antibiotics:    Immunologic:    Other:  acetaminophen     Tablet .. PRN  chlorhexidine 2% Cloths  melatonin      Review of Systems--  A 10-point review of systems was obtained.     Pertinent positives and negatives--  Constitutional: No fevers. No Chills. No Rigors.   Cardiovascular: No chest pain. No palpitations.  Respiratory: No shortness of breath. No cough.  Gastrointestinal: No nausea or vomiting. No diarrhea or constipation.   Psychiatric: Pleasant. Appropriate affect.    Review of systems otherwise negative except as previously noted.    Physical Examination--  Vital Signs: T(F): 98.7 (11-09-23 @ 05:10), Max: 98.7 (11-09-23 @ 05:10)  HR: 73 (11-09-23 @ 05:10)  BP: 111/68 (11-09-23 @ 05:10)  RR: 18 (11-09-23 @ 05:10)  SpO2: 99% (11-09-23 @ 05:10)  Wt(kg): --  General: Nontoxic-appearing Male in no acute distress.  HEENT: AT/NC. PERRL. EOMI. Anicteric. Conjunctiva pink and moist. Oropharynx clear. Dentition fair.  Neck: Not rigid. No sense of mass.  Nodes: None palpable.  Lungs: Clear bilaterally without rales, wheezing or rhonchi  Heart: Regular rate and rhythm. No Murmur. No rub. No gallop. No palpable thrill.  Abdomen: Bowel sounds present and normoactive. Soft. Nondistended. Nontender. No sense of mass. No organomegaly.  Back: No spinal tenderness. No costovertebral angle tenderness.   Extremities: No cyanosis or clubbing. No edema.   Skin: Warm. Dry. Good turgor. No rash. No vasculitic stigmata.  Psychiatric: Appropriate affect and mood for situation.         Laboratory Studies--  CBC                        13.6   8.93  )-----------( 361      ( 09 Nov 2023 07:28 )             42.5       Chemistries  11-09    141  |  105  |  15  ----------------------------<  92  3.9   |  22  |  0.85    Ca    9.3      09 Nov 2023 07:31    TPro  6.8  /  Alb  3.9  /  TBili  0.1<L>  /  DBili  x   /  AST  11  /  ALT  14  /  AlkPhos  78  11-09      Culture Data    Culture - Abscess with Gram Stain (collected 07 Nov 2023 16:27)  Source: .Abscess Leg - Right  Gram Stain (08 Nov 2023 01:08):    No polymorphonuclear cells seen per low power field    Numerous Gram positive cocci in pairs seen per oil power field    Numerous Gram Negative Rods seen per oil power field  Preliminary Report (08 Nov 2023 22:15):    Moderate Staphylococcus aureus    Numerous Proteus mirabilis    Culture - Blood (collected 07 Nov 2023 04:30)  Source: .Blood Blood-Peripheral  Preliminary Report (08 Nov 2023 12:01):    No growth at 24 hours    Culture - Blood (collected 07 Nov 2023 04:15)  Source: .Blood Blood-Peripheral  Preliminary Report (08 Nov 2023 12:01):    No growth at 24 hours             [facility]  Division of Infectious Diseases  [phone]    Name: SIRENA ENG  Age: 28y  Gender: Male  MRN: [id number]    Interval History--  Notes reviewed.     Past Medical History--  Venous insufficiency        For details regarding the patient's social history, family history, and other miscellaneous elements, please refer the initial infectious diseases consultation and/or the admitting history and physical examination for this admission.    Allergies    No Known Drug Allergies  avocado (Hives; Swelling)  Kiwi (Hives; Swelling)    Intolerances        Medications--  Antibiotics:    Immunologic:    Other:  acetaminophen     Tablet .. PRN  chlorhexidine 2% Cloths  melatonin      Review of Systems--  A 10-point review of systems was obtained.     Pertinent positives and negatives--  Constitutional: No fevers. No Chills. No Rigors.   Cardiovascular: No chest pain. No palpitations.  Respiratory: No shortness of breath. No cough.  Gastrointestinal: No nausea or vomiting. No diarrhea or constipation.   Psychiatric: Pleasant. Appropriate affect.    Review of systems otherwise negative except as previously noted.    Physical Examination--  Vital Signs: T(F): 98.7 (11-09-23 @ 05:10), Max: 98.7 (11-09-23 @ 05:10)  HR: 73 (11-09-23 @ 05:10)  BP: 111/68 (11-09-23 @ 05:10)  RR: 18 (11-09-23 @ 05:10)  SpO2: 99% (11-09-23 @ 05:10)  Wt(kg): --  General: Nontoxic-appearing Male in no acute distress.  HEENT: AT/NC. PERRL. EOMI. Anicteric. Conjunctiva pink and moist. Oropharynx clear. Dentition fair.  Neck: Not rigid. No sense of mass.  Nodes: None palpable.  Lungs: Clear bilaterally without rales, wheezing or rhonchi  Heart: Regular rate and rhythm. No Murmur. No rub. No gallop. No palpable thrill.  Abdomen: Bowel sounds present and normoactive. Soft. Nondistended. Nontender. No sense of mass. No organomegaly.  Back: No spinal tenderness. No costovertebral angle tenderness.   Extremities: No cyanosis or clubbing. No edema.   Skin: Warm. Dry. Good turgor. No rash. No vasculitic stigmata.  Psychiatric: Appropriate affect and mood for situation.         Laboratory Studies--  CBC                        13.6   8.93  )-----------( 361      ( 09 Nov 2023 07:28 )             42.5       Chemistries  11-09    141  |  105  |  15  ----------------------------<  92  3.9   |  22  |  0.85    Ca    9.3      09 Nov 2023 07:31    TPro  6.8  /  Alb  3.9  /  TBili  0.1<L>  /  DBili  x   /  AST  11  /  ALT  14  /  AlkPhos  78  11-09      Culture Data    Culture - Abscess with Gram Stain (collected 07 Nov 2023 16:27)  Source: .Abscess Leg - Right  Gram Stain (08 Nov 2023 01:08):    No polymorphonuclear cells seen per low power field    Numerous Gram positive cocci in pairs seen per oil power field    Numerous Gram Negative Rods seen per oil power field  Preliminary Report (08 Nov 2023 22:15):    Moderate Staphylococcus aureus    Numerous Proteus mirabilis    Culture - Blood (collected 07 Nov 2023 04:30)  Source: .Blood Blood-Peripheral  Preliminary Report (08 Nov 2023 12:01):    No growth at 24 hours    Culture - Blood (collected 07 Nov 2023 04:15)  Source: .Blood Blood-Peripheral  Preliminary Report (08 Nov 2023 12:01):    No growth at 24 hours             [facility]  Division of Infectious Diseases  [phone]    Name: SIRENA ENG  Age: 28y  Gender: Male  MRN: [id number]    Interval History--  Notes reviewed. Seen earlier this am. Sleeping soundly on arrival. Rouses then nods back off.  Derm input and wound care input appreciated.     Past Medical History--  Venous insufficiency      For details regarding the patient's social history, family history, and other miscellaneous elements, please refer the initial infectious diseases consultation and/or the admitting history and physical examination for this admission.    Allergies    No Known Drug Allergies  avocado (Hives; Swelling)  Kiwi (Hives; Swelling)    Intolerances        Medications--  Antibiotics:    Immunologic:    Other:  acetaminophen     Tablet .. PRN  chlorhexidine 2% Cloths  melatonin      Review of Systems--  Review of systems unable secondary to clinical condition.     Physical Examination--  Vital Signs: T(F): 98.7 (11-09-23 @ 05:10), Max: 98.7 (11-09-23 @ 05:10)  HR: 73 (11-09-23 @ 05:10)  BP: 111/68 (11-09-23 @ 05:10)  RR: 18 (11-09-23 @ 05:10)  SpO2: 99% (11-09-23 @ 05:10)  Wt(kg): --  General: Nontoxic appearing man in no acute distress   Ext: Involved extremity: Right Lower--   Proximal lymphadenopathy: absent.   Lymphangitis: absent.   Tenderness: present., similar to yesterday  Calor: mild  Edema: 2+  Erythema: chronic violaceous skin changes       Intensity: uncahnged       Extent: unchanged       Blanching: non  Crepitus: absent.   Fluctuance: absent.   Devitalized tissue: absent.   Anesthesia: absent.   Bullae: absent.   Ulceration: unchanged, drier appearing  Drainage: none today  Odor: absent.   Pulses: 2+  Otherwise No cyanosis, clubbing or edema.      Laboratory Studies--  CBC                        13.6   8.93  )-----------( 361      ( 09 Nov 2023 07:28 )             42.5       Chemistries  11-09    141  |  105  |  15  ----------------------------<  92  3.9   |  22  |  0.85    Ca    9.3      09 Nov 2023 07:31    TPro  6.8  /  Alb  3.9  /  TBili  0.1<L>  /  DBili  x   /  AST  11  /  ALT  14  /  AlkPhos  78  11-09    Full T Cell Subset (11.09.23 @ 07:33)    CD4 %: 44 %   ABS CD4: 1407 cells/uL    HIV not resulted  No Ig's yet      Culture Data    Culture - Abscess with Gram Stain (collected 07 Nov 2023 16:27)  Source: .Abscess Leg - Right  Gram Stain (08 Nov 2023 01:08):    No polymorphonuclear cells seen per low power field    Numerous Gram positive cocci in pairs seen per oil power field    Numerous Gram Negative Rods seen per oil power field  Preliminary Report (08 Nov 2023 22:15):    Moderate Staphylococcus aureus    Numerous Proteus mirabilis    Culture - Blood (collected 07 Nov 2023 04:30)  Source: .Blood Blood-Peripheral  Preliminary Report (08 Nov 2023 12:01):    No growth at 24 hours    Culture - Blood (collected 07 Nov 2023 04:15)  Source: .Blood Blood-Peripheral  Preliminary Report (08 Nov 2023 12:01):    No growth at 24 hours    < from: CT Abdomen and Pelvis w/ IV Cont (11.09.23 @ 05:50) >  FINDINGS:  LOWER CHEST: Within normal limits.    LIVER: Within normal limits.  BILE DUCTS: Normal caliber.  GALLBLADDER: Contracted.  SPLEEN: Within normal limits.  PANCREAS: Within normal limits.  ADRENALS: Within normal limits.  KIDNEYS/URETERS: A 5 mm nonobstructing hyperdensity in the upper pole of   the left kidney may reflect a renal calculus or parenchymal   calcification. No hydronephrosis. Normal and symmetric renal parenchymal   enhancement.    BLADDER: Within normal limits.  REPRODUCTIVE ORGANS: The prostate is within normal limits.    BOWEL: No bowel obstruction. Appendix is normal.  PERITONEUM: No ascites.  VESSELS: Within normal limits.  RETROPERITONEUM/LYMPH NODES: Enlarged right inguinal lymph nodes, which   demonstrate normal morphology and fatty bassem, and may be reactive. For   reference, the largest lymph node, which is incompletely imaged, measures   at least 4.6 x 1.9 cm (series 5 image 26). Mildly enlarged distal right   external iliac lymphnode measuring 2.1 x 1.2 cm (series 3 image 136).  ABDOMINAL WALL: Within normal limits.  BONES: Within normal limits.    IMPRESSION:  No intra-abdominal findings to explain the patient's right lower   extremity swelling.    Right inguinal and right external iliac lymphadenopathy.    < end of copied text >           [facility]  Division of Infectious Diseases  [phone]    Name: SIRENA ENG  Age: 28y  Gender: Male  MRN: [id number]    Interval History--  Notes reviewed. Seen earlier this am. Sleeping soundly on arrival. Rouses then nods back off.  Derm input and wound care input appreciated.     Past Medical History--  Venous insufficiency      For details regarding the patient's social history, family history, and other miscellaneous elements, please refer the initial infectious diseases consultation and/or the admitting history and physical examination for this admission.    Allergies    No Known Drug Allergies  avocado (Hives; Swelling)  Kiwi (Hives; Swelling)    Intolerances        Medications--  Antibiotics:    Immunologic:    Other:  acetaminophen     Tablet .. PRN  chlorhexidine 2% Cloths  melatonin      Review of Systems--  Review of systems unable secondary to clinical condition.     Physical Examination--  Vital Signs: T(F): 98.7 (11-09-23 @ 05:10), Max: 98.7 (11-09-23 @ 05:10)  HR: 73 (11-09-23 @ 05:10)  BP: 111/68 (11-09-23 @ 05:10)  RR: 18 (11-09-23 @ 05:10)  SpO2: 99% (11-09-23 @ 05:10)  Wt(kg): --  General: Nontoxic appearing man in no acute distress   Ext: Involved extremity: Right Lower--   Proximal lymphadenopathy: absent.   Lymphangitis: absent.   Tenderness: present., similar to yesterday  Calor: mild  Edema: 2+  Erythema: chronic violaceous skin changes       Intensity: uncahnged       Extent: unchanged       Blanching: non  Crepitus: absent.   Fluctuance: absent.   Devitalized tissue: absent.   Anesthesia: absent.   Bullae: absent.   Ulceration: unchanged, drier appearing  Drainage: none today  Odor: absent.   Pulses: 2+  Otherwise No cyanosis, clubbing or edema.      Laboratory Studies--  CBC                        13.6   8.93  )-----------( 361      ( 09 Nov 2023 07:28 )             42.5       Chemistries  11-09    141  |  105  |  15  ----------------------------<  92  3.9   |  22  |  0.85    Ca    9.3      09 Nov 2023 07:31    TPro  6.8  /  Alb  3.9  /  TBili  0.1<L>  /  DBili  x   /  AST  11  /  ALT  14  /  AlkPhos  78  11-09    Full T Cell Subset (11.09.23 @ 07:33)    CD4 %: 44 %   ABS CD4: 1407 cells/uL    HIV not resulted  No Ig's yet      Culture Data    Culture - Abscess with Gram Stain (collected 07 Nov 2023 16:27)  Source: .Abscess Leg - Right  Gram Stain (08 Nov 2023 01:08):    No polymorphonuclear cells seen per low power field    Numerous Gram positive cocci in pairs seen per oil power field    Numerous Gram Negative Rods seen per oil power field  Preliminary Report (08 Nov 2023 22:15):    Moderate Staphylococcus aureus    Numerous Proteus mirabilis    Culture - Blood (collected 07 Nov 2023 04:30)  Source: .Blood Blood-Peripheral  Preliminary Report (08 Nov 2023 12:01):    No growth at 24 hours    Culture - Blood (collected 07 Nov 2023 04:15)  Source: .Blood Blood-Peripheral  Preliminary Report (08 Nov 2023 12:01):    No growth at 24 hours    < from: CT Abdomen and Pelvis w/ IV Cont (11.09.23 @ 05:50) >  FINDINGS:  LOWER CHEST: Within normal limits.    LIVER: Within normal limits.  BILE DUCTS: Normal caliber.  GALLBLADDER: Contracted.  SPLEEN: Within normal limits.  PANCREAS: Within normal limits.  ADRENALS: Within normal limits.  KIDNEYS/URETERS: A 5 mm nonobstructing hyperdensity in the upper pole of   the left kidney may reflect a renal calculus or parenchymal   calcification. No hydronephrosis. Normal and symmetric renal parenchymal   enhancement.    BLADDER: Within normal limits.  REPRODUCTIVE ORGANS: The prostate is within normal limits.    BOWEL: No bowel obstruction. Appendix is normal.  PERITONEUM: No ascites.  VESSELS: Within normal limits.  RETROPERITONEUM/LYMPH NODES: Enlarged right inguinal lymph nodes, which   demonstrate normal morphology and fatty bassem, and may be reactive. For   reference, the largest lymph node, which is incompletely imaged, measures   at least 4.6 x 1.9 cm (series 5 image 26). Mildly enlarged distal right   external iliac lymphnode measuring 2.1 x 1.2 cm (series 3 image 136).  ABDOMINAL WALL: Within normal limits.  BONES: Within normal limits.    IMPRESSION:  No intra-abdominal findings to explain the patient's right lower   extremity swelling.    Right inguinal and right external iliac lymphadenopathy.    < end of copied text >           [facility]  Division of Infectious Diseases  [phone]    Name: SIRENA ENG  Age: 28y  Gender: Male  MRN: [id number]    Interval History--  Notes reviewed. Seen earlier this am. Sleeping soundly on arrival. Rouses then nods back off.  Derm input and wound care input appreciated.     Past Medical History--  Venous insufficiency      For details regarding the patient's social history, family history, and other miscellaneous elements, please refer the initial infectious diseases consultation and/or the admitting history and physical examination for this admission.    Allergies    No Known Drug Allergies  avocado (Hives; Swelling)  Kiwi (Hives; Swelling)    Intolerances        Medications--  Antibiotics:    Immunologic:    Other:  acetaminophen     Tablet .. PRN  chlorhexidine 2% Cloths  melatonin      Review of Systems--  Review of systems unable secondary to clinical condition.     Physical Examination--  Vital Signs: T(F): 98.7 (11-09-23 @ 05:10), Max: 98.7 (11-09-23 @ 05:10)  HR: 73 (11-09-23 @ 05:10)  BP: 111/68 (11-09-23 @ 05:10)  RR: 18 (11-09-23 @ 05:10)  SpO2: 99% (11-09-23 @ 05:10)  Wt(kg): --  General: Nontoxic appearing man in no acute distress   Ext: Involved extremity: Right Lower--   Proximal lymphadenopathy: absent.   Lymphangitis: absent.   Tenderness: present., similar to yesterday  Calor: mild  Edema: 2+  Erythema: chronic violaceous skin changes       Intensity: uncahnged       Extent: unchanged       Blanching: non  Crepitus: absent.   Fluctuance: absent.   Devitalized tissue: absent.   Anesthesia: absent.   Bullae: absent.   Ulceration: unchanged, drier appearing  Drainage: none today  Odor: absent.   Pulses: 2+  Otherwise No cyanosis, clubbing or edema.      Laboratory Studies--  CBC                        13.6   8.93  )-----------( 361      ( 09 Nov 2023 07:28 )             42.5       Chemistries  11-09    141  |  105  |  15  ----------------------------<  92  3.9   |  22  |  0.85    Ca    9.3      09 Nov 2023 07:31    TPro  6.8  /  Alb  3.9  /  TBili  0.1<L>  /  DBili  x   /  AST  11  /  ALT  14  /  AlkPhos  78  11-09    Full T Cell Subset (11.09.23 @ 07:33)    CD4 %: 44 %   ABS CD4: 1407 cells/uL    HIV not resulted  No Ig's yet      Culture Data    Culture - Abscess with Gram Stain (collected 07 Nov 2023 16:27)  Source: .Abscess Leg - Right  Gram Stain (08 Nov 2023 01:08):    No polymorphonuclear cells seen per low power field    Numerous Gram positive cocci in pairs seen per oil power field    Numerous Gram Negative Rods seen per oil power field  Preliminary Report (08 Nov 2023 22:15):    Moderate Staphylococcus aureus    Numerous Proteus mirabilis    Culture - Blood (collected 07 Nov 2023 04:30)  Source: .Blood Blood-Peripheral  Preliminary Report (08 Nov 2023 12:01):    No growth at 24 hours    Culture - Blood (collected 07 Nov 2023 04:15)  Source: .Blood Blood-Peripheral  Preliminary Report (08 Nov 2023 12:01):    No growth at 24 hours    < from: CT Abdomen and Pelvis w/ IV Cont (11.09.23 @ 05:50) >  FINDINGS:  LOWER CHEST: Within normal limits.    LIVER: Within normal limits.  BILE DUCTS: Normal caliber.  GALLBLADDER: Contracted.  SPLEEN: Within normal limits.  PANCREAS: Within normal limits.  ADRENALS: Within normal limits.  KIDNEYS/URETERS: A 5 mm nonobstructing hyperdensity in the upper pole of   the left kidney may reflect a renal calculus or parenchymal   calcification. No hydronephrosis. Normal and symmetric renal parenchymal   enhancement.    BLADDER: Within normal limits.  REPRODUCTIVE ORGANS: The prostate is within normal limits.    BOWEL: No bowel obstruction. Appendix is normal.  PERITONEUM: No ascites.  VESSELS: Within normal limits.  RETROPERITONEUM/LYMPH NODES: Enlarged right inguinal lymph nodes, which   demonstrate normal morphology and fatty bassem, and may be reactive. For   reference, the largest lymph node, which is incompletely imaged, measures   at least 4.6 x 1.9 cm (series 5 image 26). Mildly enlarged distal right   external iliac lymphnode measuring 2.1 x 1.2 cm (series 3 image 136).  ABDOMINAL WALL: Within normal limits.  BONES: Within normal limits.    IMPRESSION:  No intra-abdominal findings to explain the patient's right lower   extremity swelling.    Right inguinal and right external iliac lymphadenopathy.    < end of copied text >

## 2023-11-09 NOTE — DISCHARGE NOTE NURSING/CASE MANAGEMENT/SOCIAL WORK - PATIENT PORTAL LINK FT
You can access the FollowMyHealth Patient Portal offered by [facility] by registering at the following website: [email or web address]. By joining [employer]’s FollowMyHealth portal, you will also be able to view your health information using other applications (apps) compatible with our system. You can access the FollowMyHealth Patient Portal offered by [facility] by registering at the following website: [email or web address]. By joining [employer]’s FollowMyHealth portal, you will also be able to view your health information using other applications (apps) compatible with our system. You can access the FollowMyHealth Patient Portal offered by [facility] by registering at the following website: [email or web address]. By joining [employer]’s FollowMyHealth portal, you will also be able to view your health information using other applications (apps) compatible with our system.

## 2023-11-09 NOTE — DISCHARGE NOTE PROVIDER - CARE PROVIDERS DIRECT ADDRESSES
,DirectAddress_Unknown,DirectAddress_Unknown ,DirectAddress_Unknown,DirectAddress_Unknown,[email or web address] ,DirectAddress_Unknown,DirectAddress_Unknown,[email or web address] ,DirectAddress_Unknown,DirectAddress_Unknown,[email or web address]
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
## 2023-11-09 NOTE — DISCHARGE NOTE PROVIDER - NPI NUMBER (FOR SYSADMIN USE ONLY) :
[[id number]],[[id number]] [[id number]],[[id number]] [[id number]],[[id number]] [[id number]],[[id number]],[[id number]] [[id number]],[[id number]],[[id number]] [[id number]],[[id number]],[[id number]]

## 2023-11-09 NOTE — DISCHARGE NOTE NURSING/CASE MANAGEMENT/SOCIAL WORK - NSDCPEFALRISK_GEN_ALL_CORE
For information on Fall & Injury Prevention, visit: https://www.[facility]/news/fall-prevention-protects-and-maintains-health-and-mobility OR  https://www.[facility]/news/fall-prevention-tips-to-avoid-injury OR  https://www.cdc.gov/steadi/patient.html For information on Fall & Injury Prevention, visit: https://www.[facility]/news/fall-prevention-protects-and-maintains-health-and-mobility OR  https://www.[facility]/news/fall-prevention-tips-to-avoid-injury OR  https://www.cdc.gov/steadi/patient.html For information on Fall & Injury Prevention, visit: https://www.[facility]/news/fall-prevention-protects-and-maintains-health-and-mobility OR  https://www.[facility]/news/fall-prevention-tips-to-avoid-injury OR  https://www.cdc.gov/steadi/patient.html

## 2023-11-09 NOTE — DISCHARGE NOTE NURSING/CASE MANAGEMENT/SOCIAL WORK - NSDCPEEMAIL_GEN_ALL_CORE
[facility] for Tobacco Control email [email or web address] for Tobacco Control email [email or web address] email [email or web address]
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
## 2023-11-09 NOTE — DISCHARGE NOTE PROVIDER - CARE PROVIDER_API CALL
Tez Yeh  Infectious Disease  [address] [place] [address]  Phone: [phone]  Fax: ()-  Follow Up Time:     Kaylene Rudd  Dermatology  [address] [place] [address], [facility] [address]  [place] [address]  Phone: [phone]  Fax: [phone]  Follow Up Time:    Tez Yeh  Infectious Disease  [address] [place] [address]  Phone: [phone]  Fax: ()-  Follow Up Time:     Kaylene Rudd  Dermatology  [address] [place] [address], [facility] [address]  [place] [address]  Phone: [phone]  Fax: [phone]  Follow Up Time:    Tez Yeh  Infectious Disease  [address] [place] [address]  Phone: [phone]  Fax: ()-  Follow Up Time:     Kaylene Rudd  Dermatology  [address] [place] [address], [facility] [address]  [place] [address]  Phone: [phone]  Fax: [phone]  Follow Up Time:    Tez Yeh  Infectious Disease  [address] [place] [address]  Phone: [phone]  Fax: ()-  Follow Up Time:     Kaylene Rudd  Dermatology  [address] [place] [address], [address]  [place] [address]  Phone: [phone]  Fax: [phone]  Follow Up Time:     Reji Hoyt  Surgery  [address] [place] [address] [address]  [place] [address]  Phone: [phone]  Fax: [phone]  Follow Up Time:    Tez Yeh  Infectious Disease  [address] [place] [address]  Phone: [phone]  Fax: ()-  Follow Up Time:     Kaylene Rudd  Dermatology  [address] [place] [address], [address]  [place] [address]  Phone: [phone]  Fax: [phone]  Follow Up Time:     Reji Hoyt  Surgery  [address] [place] [address] [address]  [place] [address]  Phone: [phone]  Fax: [phone]  Follow Up Time:    Tez Yeh  Infectious Disease  [address] [place] [address]  Phone: [phone]  Fax: ()-  Follow Up Time:     Kaylene Rudd  Dermatology  [address] [place] [address], [address]  [place] [address]  Phone: [phone]  Fax: [phone]  Follow Up Time:     Reji Hoyt  Surgery  [address] [place] [address] [address]  [place] [address]  Phone: [phone]  Fax: [phone]  Follow Up Time:

## 2023-11-09 NOTE — DISCHARGE NOTE PROVIDER - NSDCCPCAREPLAN_GEN_ALL_CORE_FT
PRINCIPAL DISCHARGE DIAGNOSIS  Diagnosis: Leg wound, right  Assessment and Plan of Treatment: Low suspicion for arterial insufficiency given good perfusion on exam, chronicity of symptoms. Duplex neg for DVT.  ID consult appreciated, monitor off antibiotics. Sent IgG subset, immunoglobulins, and HIV screening- outpt follow up.   Changes consistent with unilateral LE venous stasis remain unexplained at present. No clear or convincing evidence of infection- though clearly venous stasis dermatitis can become secondarily infected. Would view wound isolates as colonizing margo at this point in time.    Derm consult & they believe "Traumatic ulceration with background chronic lipodermatosclerosis 2/2 stasis dermatitis", continue with wound care . Wound care consult appreciated.   - CT A&P w/o pathology to explain right leg swelling, some LAD.   MRI LE ordered to  look for any deep/bony abnormality. ______________*****  Will require OP follow up with vascular and wound care.     PRINCIPAL DISCHARGE DIAGNOSIS  Diagnosis: Leg wound, right  Assessment and Plan of Treatment: PVD with wound infection.   -MRI neg for OM; LE duplex neg DVT   - Continue keflex antibiotic as prescribed ---   -follow-up with Dr. Tucker surgery   - Wound care arranged by DHEERAJ.   - FOllow-up with your PCP --call for appointment

## 2023-11-09 NOTE — DISCHARGE NOTE PROVIDER - PROVIDER TOKENS
PROVIDER:[TOKEN:[[id number]]],PROVIDER:[TOKEN:[[id number]]] PROVIDER:[TOKEN:[[id number]]],PROVIDER:[TOKEN:[[id number]]] PROVIDER:[TOKEN:[[id number]]],PROVIDER:[TOKEN:[[id number]]] PROVIDER:[TOKEN:[[id number]]],PROVIDER:[TOKEN:[[id number]]],PROVIDER:[TOKEN:[[id number]]] PROVIDER:[TOKEN:[[id number]]],PROVIDER:[TOKEN:[[id number]]],PROVIDER:[TOKEN:[[id number]]] PROVIDER:[TOKEN:[[id number]]],PROVIDER:[TOKEN:[[id number]]],PROVIDER:[TOKEN:[[id number]]]

## 2023-11-09 NOTE — DISCHARGE NOTE NURSING/CASE MANAGEMENT/SOCIAL WORK - SOCIAL WORKER'S NAME
Sonja Escamilla, [facility] [phone] Sonja Escamilla, [facility] [phone] Sonja Escamilla, [facility] [phone]

## 2023-11-10 PROCEDURE — 99232 SBSQ HOSP IP/OBS MODERATE 35: CPT

## 2023-11-10 PROCEDURE — 99254 IP/OBS CNSLTJ NEW/EST MOD 60: CPT

## 2023-11-10 PROCEDURE — 99239 HOSP IP/OBS DSCHRG MGMT >30: CPT

## 2023-11-10 RX ORDER — CEPHALEXIN 500 MG
500 CAPSULE ORAL
Refills: 0 | Status: DISCONTINUED | OUTPATIENT
Start: 2023-11-10 | End: 2023-11-11

## 2023-11-10 RX ORDER — CEPHALEXIN 500 MG
1 CAPSULE ORAL
Qty: 28 | Refills: 0
Start: 2023-11-10 | End: 2023-11-16

## 2023-11-10 RX ADMIN — Medication 1 EACH: at 12:25

## 2023-11-10 RX ADMIN — Medication 500 MILLIGRAM(S): at 12:19

## 2023-11-10 RX ADMIN — Medication 3 MILLIGRAM(S): at 23:12

## 2023-11-10 RX ADMIN — Medication 500 MILLIGRAM(S): at 23:12

## 2023-11-10 RX ADMIN — CHLORHEXIDINE GLUCONATE 1 APPLICATION(S): 213 SOLUTION TOPICAL at 12:19

## 2023-11-10 RX ADMIN — Medication 500 MILLIGRAM(S): at 17:37
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
## 2023-11-10 NOTE — PROGRESS NOTE ADULT - SUBJECTIVE AND OBJECTIVE BOX
Patient is a 28y old  Male who presents with a chief complaint of Cellulitis (10 Nov 2023 13:26)      SUBJECTIVE / OVERNIGHT EVENTS: Patient seen and examined at bedside. No acute events overnight. Pain unchanged. Apparently leaving the unit to vape. No fever/chills    MEDICATIONS  (STANDING):  cephalexin 500 milliGRAM(s) Oral four times a day  chlorhexidine 2% Cloths 1 Application(s) Topical daily  melatonin 3 milliGRAM(s) Oral at bedtime  nicotine - Inhaler 1 Each Inhalation daily    MEDICATIONS  (PRN):  acetaminophen     Tablet .. 650 milliGRAM(s) Oral every 6 hours PRN Temp greater or equal to 38C (100.4F), Mild Pain (1 - 3), Moderate Pain (4 - 6), Severe Pain (7 - 10)      CAPILLARY BLOOD GLUCOSE        I&O's Summary    09 Nov 2023 07:01  -  10 Nov 2023 07:00  --------------------------------------------------------  IN: 240 mL / OUT: 2 mL / NET: 238 mL        PHYSICAL EXAM:  Vital Signs Last 24 Hrs  T(C): 36.4 (10 Nov 2023 09:16), Max: 36.7 (09 Nov 2023 17:10)  T(F): 97.5 (10 Nov 2023 09:16), Max: 98 (09 Nov 2023 17:10)  HR: 82 (10 Nov 2023 09:16) (80 - 97)  BP: 110/66 (10 Nov 2023 09:16) (110/66 - 138/81)  BP(mean): --  RR: 18 (10 Nov 2023 09:16) (18 - 18)  SpO2: 99% (10 Nov 2023 09:16) (95% - 99%)    Parameters below as of 10 Nov 2023 09:16  Patient On (Oxygen Delivery Method): room air        GEN: male in NAD, appears comfortable, no diaphoresis  EYES: No scleral injection, EOMI  ENTM: neck supple & symmetric without tracheal deviation, moist membranes, no gross hearing impairment, thyroid gland not enlarged  CV: +S1/S2, no m/r/g, no abdominal bruit, mild swelling of right leg  RESP: breathing comfortably, no respiratory accessory muscle use, CTAB, no w/r/r  GI: normoactive BS, soft, NTND, no rebounding/guarding, no palpable masses    LABS:                        13.6   8.93  )-----------( 361      ( 09 Nov 2023 07:28 )             42.5     11-09    141  |  105  |  15  ----------------------------<  92  3.9   |  22  |  0.85    Ca    9.3      09 Nov 2023 07:31    TPro  6.8  /  Alb  3.9  /  TBili  0.1<L>  /  DBili  x   /  AST  11  /  ALT  14  /  AlkPhos  78  11-09          Urinalysis Basic - ( 09 Nov 2023 07:31 )    Color: x / Appearance: x / SG: x / pH: x  Gluc: 92 mg/dL / Ketone: x  / Bili: x / Urobili: x   Blood: x / Protein: x / Nitrite: x   Leuk Esterase: x / RBC: x / WBC x   Sq Epi: x / Non Sq Epi: x / Bacteria: x        Culture - Abscess with Gram Stain (collected 07 Nov 2023 16:27)  Source: .Abscess Leg - Right  Gram Stain (08 Nov 2023 01:08):    No polymorphonuclear cells seen per low power field    Numerous Gram positive cocci in pairs seen per oil power field    Numerous Gram Negative Rods seen per oil power field  Preliminary Report (08 Nov 2023 22:15):    Moderate Staphylococcus aureus    Numerous Proteus mirabilis  Organism: Staphylococcus aureus  Proteus mirabilis (09 Nov 2023 18:33)  Organism: Staphylococcus aureus (09 Nov 2023 18:33)  Organism: Proteus mirabilis (09 Nov 2023 18:33)        RADIOLOGY & ADDITIONAL TESTS:  Results Reviewed:   Imaging Personally Reviewed:  Electrocardiogram Personally Reviewed:    COORDINATION OF CARE:  Care Discussed with Consultants/Other Providers [Y/N]:  Prior or Outpatient Records Reviewed [Y/N]:
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
## 2023-11-10 NOTE — CONSULT NOTE ADULT - ASSESSMENT
ASSESSMENT: 29y/o M w/ hx of polysubstance abuse, venous insufficiency (follows two vascular surgeons outpatient) presents with persistent and worsening medial RLE wound over the past month. Inpatient workup has included RLE duplex which was negative for DVT. CT A/P w/ no findings of intraadbominal causes of RLE edema and MRI 11/9 with cutaneous wound that communicates with partially fluid-filled irregularly shaped defect in the subQ tissue. General Surgery consulted for evaluation.    PLAN:  ***incomplete***  -   -   -       Patient to be discussed with ACS attending, Dr. Hoyt.    Linda Jones, PGY-2  ACS  [id number] ASSESSMENT: 27y/o M w/ hx of polysubstance abuse, venous insufficiency (follows two vascular surgeons outpatient) presents with persistent and worsening medial RLE wound over the past month. Inpatient workup has included RLE duplex which was negative for DVT. CT A/P w/ no findings of intraadbominal causes of RLE edema and MRI 11/9 with cutaneous wound that communicates with partially fluid-filled irregularly shaped defect in the subQ tissue. General Surgery consulted for evaluation.    PLAN:  ***incomplete***  -   -   -       Patient to be discussed with ACS attending, Dr. Hoyt.    Linda Jones, PGY-2  ACS  [id number] ASSESSMENT: 29y/o M w/ hx of polysubstance abuse, venous insufficiency (follows two vascular surgeons outpatient) presents with persistent and worsening medial RLE wound over the past month. Inpatient workup has included RLE duplex which was negative for DVT. CT A/P w/ no findings of intraadbominal causes of RLE edema and MRI 11/9 with cutaneous wound that communicates with partially fluid-filled irregularly shaped defect in the subQ tissue. General Surgery consulted for evaluation.    Recommendations:  - No acute surgical intervention  - Continue abx per ID  - No discrete areas of fluctuance on exam, RLE defect communicating w/ wound on MRI  - Monitor evolution of wound   - Surgery will follow      Patient to be discussed with ACS attending, Dr. Hoyt.    Linda Jnoes, PGY-2  ACS  [id number] ASSESSMENT: 27y/o M w/ hx of polysubstance abuse, venous insufficiency (follows two vascular surgeons outpatient) presents with persistent and worsening medial RLE wound over the past month. Inpatient workup has included RLE duplex which was negative for DVT. CT A/P w/ no findings of intraadbominal causes of RLE edema and MRI 11/9 with cutaneous wound that communicates with partially fluid-filled irregularly shaped defect in the subQ tissue. General Surgery consulted for evaluation.    Recommendations:  - No acute surgical intervention  - Continue abx per ID  - No discrete areas of fluctuance on exam, RLE defect communicating w/ wound on MRI  - Monitor evolution of wound   - Surgery will follow      Patient to be discussed with ACS attending, Dr. Hoyt.    Linda Jones, PGY-2  ACS  [id number] ASSESSMENT: 27y/o M w/ hx of polysubstance abuse, venous insufficiency (follows two vascular surgeons outpatient) presents with persistent and worsening medial RLE wound over the past month. Inpatient workup has included RLE duplex which was negative for DVT. CT A/P w/ no findings of intraadbominal causes of RLE edema and MRI 11/9 with cutaneous wound that communicates with partially fluid-filled irregularly shaped defect in the subQ tissue. General Surgery consulted for evaluation.    Recommendations:  - No acute surgical intervention  - Continue abx per ID  - No discrete areas of fluctuance on exam, RLE defect communicating w/ wound on MRI  - Monitor evolution of wound   - Surgery will follow      Patient to be discussed with ACS attending, Dr. Hoyt.    Linda Jones, PGY-2  ACS  [id number] ASSESSMENT: 29y/o M w/ hx of polysubstance abuse, venous insufficiency (follows two vascular surgeons outpatient) presents with persistent and worsening medial RLE wound over the past month. Inpatient workup has included RLE duplex which was negative for DVT. CT A/P w/ no findings of intraadbominal causes of RLE edema and MRI 11/9 with cutaneous wound that communicates with partially fluid-filled irregularly shaped defect in the subQ tissue. General Surgery consulted for evaluation.    Recommendations:  - No acute surgical intervention  - Continue abx per ID  - No discrete areas of fluctuance on exam, RLE defect communicating w/ wound on MRI  - Monitor evolution of wound   - Surgery will follow      Patient to be discussed with ACS attending, Dr. Hoyt.    Linda Jones, PGY-2  ACS  [id number]          ASSESSMENT: 29y/o M w/ hx of polysubstance abuse, venous insufficiency (follows two vascular surgeons outpatient) presents with persistent and worsening medial RLE wound over the past month. Inpatient workup has included RLE duplex which was negative for DVT. CT A/P w/ no findings of intraadbominal causes of RLE edema and MRI 11/9 with cutaneous wound that communicates with partially fluid-filled irregularly shaped defect in the subQ tissue. General Surgery consulted for evaluation.    Recommendations:  - No acute surgical intervention  - Continue abx per ID  - No discrete areas of fluctuance on exam, RLE defect communicating w/ wound on MRI  - Monitor evolution of wound   - Surgery will follow      Patient to be discussed with ACS attending, Dr. Hoyt.    Linda Jones, PGY-2  ACS  [id number]          ASSESSMENT: 27y/o M w/ hx of polysubstance abuse, venous insufficiency (follows two vascular surgeons outpatient) presents with persistent and worsening medial RLE wound over the past month. Inpatient workup has included RLE duplex which was negative for DVT. CT A/P w/ no findings of intraadbominal causes of RLE edema and MRI 11/9 with cutaneous wound that communicates with partially fluid-filled irregularly shaped defect in the subQ tissue. General Surgery consulted for evaluation.    Recommendations:  - No acute surgical intervention  - Continue abx per ID  - No discrete areas of fluctuance on exam, RLE defect communicating w/ wound on MRI  - Monitor evolution of wound   - Surgery will follow      Patient to be discussed with ACS attending, Dr. Hoyt.    Linda Jones, PGY-2  ACS  [id number]

## 2023-11-10 NOTE — CONSULT NOTE ADULT - SUBJECTIVE AND OBJECTIVE BOX
GENERAL SURGERY CONSULT NOTE  --------------------------------------------------------------------------------------------  HPI:    27y/o M w/ hx of polysubstance abuse, venous insufficiency (follows two vascular surgeons outpatient) presents with persistent and worsening medial RLE wound over the past month. Patient reports that he has had discoloration of RLE for a while and has been worked up by vascular surgery but he suffered a puncture wound during an accident about a month ago, and this wound has been worsening over the last month. He was prescribed different antibiotics including doxycycline, cephalexin, mupirocin ointment, but he admits he has not been good about taking them as indicated.    Inpatient workup has included RLE duplex which was negative for DVT. CT A/P w/ no findings of intraadbominal causes of RLE edema and MRI 11/9 with cutaneous wound that communicates with partially fluid-filled irregularly shaped defect in the subQ tissue. General Surgery consulted for evaluation.    Patient seen and examined. Patient denies RLE pain when at rest but endorses tenderness with palpation. Denies fevers, chills, nausea, vomiting. Wound care is following, last packed the wound yesterday.       PAST MEDICAL & SURGICAL HISTORY:  Venous insufficiency        FAMILY HISTORY:  [] Family history not pertinent as reviewed with the patient and family    SOCIAL HISTORY:     ALLERGIES: No Known Drug Allergies  avocado (Hives; Swelling)  Kiwi (Hives; Swelling)      HOME MEDICATIONS:    CURRENT MEDICATIONS  MEDICATIONS (STANDING): cephalexin 500 milliGRAM(s) Oral four times a day  melatonin 3 milliGRAM(s) Oral at bedtime  nicotine - Inhaler 1 Each Inhalation daily    MEDICATIONS (PRN):acetaminophen     Tablet .. 650 milliGRAM(s) Oral every 6 hours PRN Temp greater or equal to 38C (100.4F), Mild Pain (1 - 3), Moderate Pain (4 - 6), Severe Pain (7 - 10)    --------------------------------------------------------------------------------------------    Vitals:   T(C): 36.4 (11-10-23 @ 09:16), Max: 36.7 (11-09-23 @ 17:10)  HR: 82 (11-10-23 @ 09:16) (80 - 97)  BP: 110/66 (11-10-23 @ 09:16) (110/66 - 138/81)  RR: 18 (11-10-23 @ 09:16) (18 - 18)  SpO2: 99% (11-10-23 @ 09:16) (95% - 99%)  CAPILLARY BLOOD GLUCOSE          11-09 @ 07:01  -  11-10 @ 07:00  --------------------------------------------------------  IN:    Oral Fluid: 240 mL  Total IN: 240 mL    OUT:    Voided (mL): 2 mL  Total OUT: 2 mL    Total NET: 238 mL        Height (cm): 180.3 (11-07 @ 00:59)  Weight (kg): 90.6 (11-07 @ 11:49)  BMI (kg/m2): 27.9 (11-07 @ 11:49)  BSA (m2): 2.11 (11-07 @ 11:49)      PHYSICAL EXAM:   General: NAD, Lying in bed comfortably  Neuro: Alert and answering questions appropriately   HEENT: Grossly normal, EOMI  Cardio: Regular rate  Resp: Good effort, no signs of respiratory distress  Vascular: All 4 extremities warm, palpable R DP/PT  -purple/brown discoloration of lower RLE and woody skin changes and edema  Skin: ~2cm circular wound that tracks underneath the skin 2cm medially and 5cm superiorly - no active bleeding, drainage or foul odor  --------------------------------------------------------------------------------------------    LABS  CBC (11-09 @ 07:28)                              13.6                           8.93    )----------------(  361        --    % Neutrophils, --    % Lymphocytes, ANC: --                                  42.5      BMP (11-09 @ 07:31)             141     |  105     |  15    		Ca++ --      Ca 9.3                ---------------------------------( 92    		Mg --                 3.9     |  22      |  0.85  			Ph --        LFTs (11-09 @ 07:31)      TPro 6.8 / Alb 3.9 / TBili 0.1<L> / DBili -- / AST 11 / ALT 14 / AlkPhos 78            --------------------------------------------------------------------------------------------    MICROBIOLOGY  Urinalysis (11-09 @ 07:31):     Color:  / Appearance:  / SG:  / pH:  / Gluc: 92 / Ketones:  / Bili:  / Urobili:  / Protein : / Nitrites:  / Leuk.Est:  / RBC:  / WBC:  / Sq Epi:  / Non Sq Epi:  / Bacteria        -> .Abscess Leg - Right Culture (11-07 @ 16:27)       No polymorphonuclear cells seen per low power field  Numerous Gram positive cocci in pairs seen per oil power field  Numerous Gram Negative Rods seen per oil power field<!>    Staphylococcus aureus  Proteus mirabilis<!>    Moderate Staphylococcus aureus  Numerous Proteus mirabilis<!>    -> .Blood Blood-Peripheral Culture (11-07 @ 04:30)     NG    NG    No growth at 72 Hours    -> .Blood Blood-Peripheral Culture (11-07 @ 04:15)     NG    NG    No growth at 72 Hours      --------------------------------------------------------------------------------------------    IMAGING  < from: MR Lower Ext Non-joint w/ IV Cont, Right (11.09.23 @ 16:44) >  FINDINGS: There is a cutaneous wound along the medial aspect of the calf   at the level of the mid tibial diaphysis which communicates with a   partially fluid-filled irregularly shaped defect in the medial   subcutaneous tissues. There is adjacent cellulitic change.    There are no areas of marrow signal alteration to suggest osteomyelitis.   There are no acute tendon or muscle tears. There is no muscular atrophy.    IMPRESSION: Cutaneous wound along the medial aspect of the calf at the   level of the mid tibial diaphysis which communicates with a partially   fluid-filled irregularly shaped defect in the medial subcutaneous tissues.  No osteomyelitis.    < end of copied text >      -------------------------------------------------------------------------------------------- GENERAL SURGERY CONSULT NOTE  --------------------------------------------------------------------------------------------  HPI:    29y/o M w/ hx of polysubstance abuse, venous insufficiency (follows two vascular surgeons outpatient) presents with persistent and worsening medial RLE wound over the past month. Patient reports that he has had discoloration of RLE for a while and has been worked up by vascular surgery but he suffered a puncture wound during an accident about a month ago, and this wound has been worsening over the last month. He was prescribed different antibiotics including doxycycline, cephalexin, mupirocin ointment, but he admits he has not been good about taking them as indicated.    Inpatient workup has included RLE duplex which was negative for DVT. CT A/P w/ no findings of intraadbominal causes of RLE edema and MRI 11/9 with cutaneous wound that communicates with partially fluid-filled irregularly shaped defect in the subQ tissue. General Surgery consulted for evaluation.    Patient seen and examined. Patient denies RLE pain when at rest but endorses tenderness with palpation. Denies fevers, chills, nausea, vomiting. Wound care is following, last packed the wound yesterday.       PAST MEDICAL & SURGICAL HISTORY:  Venous insufficiency        FAMILY HISTORY:  [] Family history not pertinent as reviewed with the patient and family    SOCIAL HISTORY:     ALLERGIES: No Known Drug Allergies  avocado (Hives; Swelling)  Kiwi (Hives; Swelling)      HOME MEDICATIONS:    CURRENT MEDICATIONS  MEDICATIONS (STANDING): cephalexin 500 milliGRAM(s) Oral four times a day  melatonin 3 milliGRAM(s) Oral at bedtime  nicotine - Inhaler 1 Each Inhalation daily    MEDICATIONS (PRN):acetaminophen     Tablet .. 650 milliGRAM(s) Oral every 6 hours PRN Temp greater or equal to 38C (100.4F), Mild Pain (1 - 3), Moderate Pain (4 - 6), Severe Pain (7 - 10)    --------------------------------------------------------------------------------------------    Vitals:   T(C): 36.4 (11-10-23 @ 09:16), Max: 36.7 (11-09-23 @ 17:10)  HR: 82 (11-10-23 @ 09:16) (80 - 97)  BP: 110/66 (11-10-23 @ 09:16) (110/66 - 138/81)  RR: 18 (11-10-23 @ 09:16) (18 - 18)  SpO2: 99% (11-10-23 @ 09:16) (95% - 99%)  CAPILLARY BLOOD GLUCOSE          11-09 @ 07:01  -  11-10 @ 07:00  --------------------------------------------------------  IN:    Oral Fluid: 240 mL  Total IN: 240 mL    OUT:    Voided (mL): 2 mL  Total OUT: 2 mL    Total NET: 238 mL        Height (cm): 180.3 (11-07 @ 00:59)  Weight (kg): 90.6 (11-07 @ 11:49)  BMI (kg/m2): 27.9 (11-07 @ 11:49)  BSA (m2): 2.11 (11-07 @ 11:49)      PHYSICAL EXAM:   General: NAD, Lying in bed comfortably  Neuro: Alert and answering questions appropriately   HEENT: Grossly normal, EOMI  Cardio: Regular rate  Resp: Good effort, no signs of respiratory distress  Vascular: All 4 extremities warm, palpable R DP/PT  -purple/brown discoloration of lower RLE and woody skin changes and edema  Skin: ~2cm circular wound that tracks underneath the skin 2cm medially and 5cm superiorly - no active bleeding, drainage or foul odor  --------------------------------------------------------------------------------------------    LABS  CBC (11-09 @ 07:28)                              13.6                           8.93    )----------------(  361        --    % Neutrophils, --    % Lymphocytes, ANC: --                                  42.5      BMP (11-09 @ 07:31)             141     |  105     |  15    		Ca++ --      Ca 9.3                ---------------------------------( 92    		Mg --                 3.9     |  22      |  0.85  			Ph --        LFTs (11-09 @ 07:31)      TPro 6.8 / Alb 3.9 / TBili 0.1<L> / DBili -- / AST 11 / ALT 14 / AlkPhos 78            --------------------------------------------------------------------------------------------    MICROBIOLOGY  Urinalysis (11-09 @ 07:31):     Color:  / Appearance:  / SG:  / pH:  / Gluc: 92 / Ketones:  / Bili:  / Urobili:  / Protein : / Nitrites:  / Leuk.Est:  / RBC:  / WBC:  / Sq Epi:  / Non Sq Epi:  / Bacteria        -> .Abscess Leg - Right Culture (11-07 @ 16:27)       No polymorphonuclear cells seen per low power field  Numerous Gram positive cocci in pairs seen per oil power field  Numerous Gram Negative Rods seen per oil power field<!>    Staphylococcus aureus  Proteus mirabilis<!>    Moderate Staphylococcus aureus  Numerous Proteus mirabilis<!>    -> .Blood Blood-Peripheral Culture (11-07 @ 04:30)     NG    NG    No growth at 72 Hours    -> .Blood Blood-Peripheral Culture (11-07 @ 04:15)     NG    NG    No growth at 72 Hours      --------------------------------------------------------------------------------------------    IMAGING  < from: MR Lower Ext Non-joint w/ IV Cont, Right (11.09.23 @ 16:44) >  FINDINGS: There is a cutaneous wound along the medial aspect of the calf   at the level of the mid tibial diaphysis which communicates with a   partially fluid-filled irregularly shaped defect in the medial   subcutaneous tissues. There is adjacent cellulitic change.    There are no areas of marrow signal alteration to suggest osteomyelitis.   There are no acute tendon or muscle tears. There is no muscular atrophy.    IMPRESSION: Cutaneous wound along the medial aspect of the calf at the   level of the mid tibial diaphysis which communicates with a partially   fluid-filled irregularly shaped defect in the medial subcutaneous tissues.  No osteomyelitis.    < end of copied text >      -------------------------------------------------------------------------------------------- GENERAL SURGERY CONSULT NOTE  --------------------------------------------------------------------------------------------  HPI:    27y/o M w/ hx of polysubstance abuse, venous insufficiency (follows two vascular surgeons outpatient) presents with persistent and worsening medial RLE wound over the past month. Patient reports that he has had discoloration of RLE for a while and has been worked up by vascular surgery but he suffered a puncture wound during an accident about a month ago, and this wound has been worsening over the last month. He was prescribed different antibiotics including doxycycline, cephalexin, mupirocin ointment, but he admits he has not been good about taking them as indicated.    Inpatient workup has included RLE duplex which was negative for DVT. CT A/P w/ no findings of intra adbominal causes of RLE edema and MRI 11/9 with cutaneous wound that communicates with partially fluid-filled irregularly shaped defect in the subQ tissue. Keflex was started per ID recommendations. General Surgery consulted for evaluation.    Patient seen and examined. Patient denies RLE pain when at rest but endorses tenderness with palpation. Denies fevers, chills, nausea, vomiting. Wound care is following, last packed the wound yesterday.       PAST MEDICAL & SURGICAL HISTORY:  Venous insufficiency        FAMILY HISTORY:  [] Family history not pertinent as reviewed with the patient and family    SOCIAL HISTORY:     ALLERGIES: No Known Drug Allergies  avocado (Hives; Swelling)  Kiwi (Hives; Swelling)      HOME MEDICATIONS:    CURRENT MEDICATIONS  MEDICATIONS (STANDING): cephalexin 500 milliGRAM(s) Oral four times a day  melatonin 3 milliGRAM(s) Oral at bedtime  nicotine - Inhaler 1 Each Inhalation daily    MEDICATIONS (PRN):acetaminophen     Tablet .. 650 milliGRAM(s) Oral every 6 hours PRN Temp greater or equal to 38C (100.4F), Mild Pain (1 - 3), Moderate Pain (4 - 6), Severe Pain (7 - 10)    --------------------------------------------------------------------------------------------    Vitals:   T(C): 36.4 (11-10-23 @ 09:16), Max: 36.7 (11-09-23 @ 17:10)  HR: 82 (11-10-23 @ 09:16) (80 - 97)  BP: 110/66 (11-10-23 @ 09:16) (110/66 - 138/81)  RR: 18 (11-10-23 @ 09:16) (18 - 18)  SpO2: 99% (11-10-23 @ 09:16) (95% - 99%)  CAPILLARY BLOOD GLUCOSE          11-09 @ 07:01  -  11-10 @ 07:00  --------------------------------------------------------  IN:    Oral Fluid: 240 mL  Total IN: 240 mL    OUT:    Voided (mL): 2 mL  Total OUT: 2 mL    Total NET: 238 mL        Height (cm): 180.3 (11-07 @ 00:59)  Weight (kg): 90.6 (11-07 @ 11:49)  BMI (kg/m2): 27.9 (11-07 @ 11:49)  BSA (m2): 2.11 (11-07 @ 11:49)      PHYSICAL EXAM:   General: NAD, Lying in bed comfortably  Neuro: Alert and answering questions appropriately   HEENT: Grossly normal, EOMI  Cardio: Regular rate  Resp: Good effort, no signs of respiratory distress  Vascular: All 4 extremities warm, palpable R DP/PT  -purple/brown discoloration of lower RLE and woody skin changes and edema  Skin: ~2cm circular wound that tracks underneath the skin 2cm medially and 5cm superiorly - no active bleeding, drainage or foul odor  --------------------------------------------------------------------------------------------    LABS  CBC (11-09 @ 07:28)                              13.6                           8.93    )----------------(  361        --    % Neutrophils, --    % Lymphocytes, ANC: --                                  42.5      BMP (11-09 @ 07:31)             141     |  105     |  15    		Ca++ --      Ca 9.3                ---------------------------------( 92    		Mg --                 3.9     |  22      |  0.85  			Ph --        LFTs (11-09 @ 07:31)      TPro 6.8 / Alb 3.9 / TBili 0.1<L> / DBili -- / AST 11 / ALT 14 / AlkPhos 78            --------------------------------------------------------------------------------------------    MICROBIOLOGY  Urinalysis (11-09 @ 07:31):     Color:  / Appearance:  / SG:  / pH:  / Gluc: 92 / Ketones:  / Bili:  / Urobili:  / Protein : / Nitrites:  / Leuk.Est:  / RBC:  / WBC:  / Sq Epi:  / Non Sq Epi:  / Bacteria        -> .Abscess Leg - Right Culture (11-07 @ 16:27)       No polymorphonuclear cells seen per low power field  Numerous Gram positive cocci in pairs seen per oil power field  Numerous Gram Negative Rods seen per oil power field<!>    Staphylococcus aureus  Proteus mirabilis<!>    Moderate Staphylococcus aureus  Numerous Proteus mirabilis<!>    -> .Blood Blood-Peripheral Culture (11-07 @ 04:30)     NG    NG    No growth at 72 Hours    -> .Blood Blood-Peripheral Culture (11-07 @ 04:15)     NG    NG    No growth at 72 Hours      --------------------------------------------------------------------------------------------    IMAGING  < from: MR Lower Ext Non-joint w/ IV Cont, Right (11.09.23 @ 16:44) >  FINDINGS: There is a cutaneous wound along the medial aspect of the calf   at the level of the mid tibial diaphysis which communicates with a   partially fluid-filled irregularly shaped defect in the medial   subcutaneous tissues. There is adjacent cellulitic change.    There are no areas of marrow signal alteration to suggest osteomyelitis.   There are no acute tendon or muscle tears. There is no muscular atrophy.    IMPRESSION: Cutaneous wound along the medial aspect of the calf at the   level of the mid tibial diaphysis which communicates with a partially   fluid-filled irregularly shaped defect in the medial subcutaneous tissues.  No osteomyelitis.    < end of copied text >      -------------------------------------------------------------------------------------------- GENERAL SURGERY CONSULT NOTE  --------------------------------------------------------------------------------------------  HPI:    29y/o M w/ hx of polysubstance abuse, venous insufficiency (follows two vascular surgeons outpatient) presents with persistent and worsening medial RLE wound over the past month. Patient reports that he has had discoloration of RLE for a while and has been worked up by vascular surgery but he suffered a puncture wound during an accident about a month ago, and this wound has been worsening over the last month. He was prescribed different antibiotics including doxycycline, cephalexin, mupirocin ointment, but he admits he has not been good about taking them as indicated.    Inpatient workup has included RLE duplex which was negative for DVT. CT A/P w/ no findings of intra adbominal causes of RLE edema and MRI 11/9 with cutaneous wound that communicates with partially fluid-filled irregularly shaped defect in the subQ tissue. Keflex was started per ID recommendations. General Surgery consulted for evaluation.    Patient seen and examined. Patient denies RLE pain when at rest but endorses tenderness with palpation. Denies fevers, chills, nausea, vomiting. Wound care is following, last packed the wound yesterday.       PAST MEDICAL & SURGICAL HISTORY:  Venous insufficiency        FAMILY HISTORY:  [] Family history not pertinent as reviewed with the patient and family    SOCIAL HISTORY:     ALLERGIES: No Known Drug Allergies  avocado (Hives; Swelling)  Kiwi (Hives; Swelling)      HOME MEDICATIONS:    CURRENT MEDICATIONS  MEDICATIONS (STANDING): cephalexin 500 milliGRAM(s) Oral four times a day  melatonin 3 milliGRAM(s) Oral at bedtime  nicotine - Inhaler 1 Each Inhalation daily    MEDICATIONS (PRN):acetaminophen     Tablet .. 650 milliGRAM(s) Oral every 6 hours PRN Temp greater or equal to 38C (100.4F), Mild Pain (1 - 3), Moderate Pain (4 - 6), Severe Pain (7 - 10)    --------------------------------------------------------------------------------------------    Vitals:   T(C): 36.4 (11-10-23 @ 09:16), Max: 36.7 (11-09-23 @ 17:10)  HR: 82 (11-10-23 @ 09:16) (80 - 97)  BP: 110/66 (11-10-23 @ 09:16) (110/66 - 138/81)  RR: 18 (11-10-23 @ 09:16) (18 - 18)  SpO2: 99% (11-10-23 @ 09:16) (95% - 99%)  CAPILLARY BLOOD GLUCOSE          11-09 @ 07:01  -  11-10 @ 07:00  --------------------------------------------------------  IN:    Oral Fluid: 240 mL  Total IN: 240 mL    OUT:    Voided (mL): 2 mL  Total OUT: 2 mL    Total NET: 238 mL        Height (cm): 180.3 (11-07 @ 00:59)  Weight (kg): 90.6 (11-07 @ 11:49)  BMI (kg/m2): 27.9 (11-07 @ 11:49)  BSA (m2): 2.11 (11-07 @ 11:49)      PHYSICAL EXAM:   General: NAD, Lying in bed comfortably  Neuro: Alert and answering questions appropriately   HEENT: Grossly normal, EOMI  Cardio: Regular rate  Resp: Good effort, no signs of respiratory distress  Vascular: All 4 extremities warm, palpable R DP/PT  -purple/brown discoloration of lower RLE and woody skin changes and edema  Skin: ~2cm circular wound that tracks underneath the skin 2cm medially and 5cm superiorly - no active bleeding, drainage or foul odor  --------------------------------------------------------------------------------------------    LABS  CBC (11-09 @ 07:28)                              13.6                           8.93    )----------------(  361        --    % Neutrophils, --    % Lymphocytes, ANC: --                                  42.5      BMP (11-09 @ 07:31)             141     |  105     |  15    		Ca++ --      Ca 9.3                ---------------------------------( 92    		Mg --                 3.9     |  22      |  0.85  			Ph --        LFTs (11-09 @ 07:31)      TPro 6.8 / Alb 3.9 / TBili 0.1<L> / DBili -- / AST 11 / ALT 14 / AlkPhos 78            --------------------------------------------------------------------------------------------    MICROBIOLOGY  Urinalysis (11-09 @ 07:31):     Color:  / Appearance:  / SG:  / pH:  / Gluc: 92 / Ketones:  / Bili:  / Urobili:  / Protein : / Nitrites:  / Leuk.Est:  / RBC:  / WBC:  / Sq Epi:  / Non Sq Epi:  / Bacteria        -> .Abscess Leg - Right Culture (11-07 @ 16:27)       No polymorphonuclear cells seen per low power field  Numerous Gram positive cocci in pairs seen per oil power field  Numerous Gram Negative Rods seen per oil power field<!>    Staphylococcus aureus  Proteus mirabilis<!>    Moderate Staphylococcus aureus  Numerous Proteus mirabilis<!>    -> .Blood Blood-Peripheral Culture (11-07 @ 04:30)     NG    NG    No growth at 72 Hours    -> .Blood Blood-Peripheral Culture (11-07 @ 04:15)     NG    NG    No growth at 72 Hours      --------------------------------------------------------------------------------------------    IMAGING  < from: MR Lower Ext Non-joint w/ IV Cont, Right (11.09.23 @ 16:44) >  FINDINGS: There is a cutaneous wound along the medial aspect of the calf   at the level of the mid tibial diaphysis which communicates with a   partially fluid-filled irregularly shaped defect in the medial   subcutaneous tissues. There is adjacent cellulitic change.    There are no areas of marrow signal alteration to suggest osteomyelitis.   There are no acute tendon or muscle tears. There is no muscular atrophy.    IMPRESSION: Cutaneous wound along the medial aspect of the calf at the   level of the mid tibial diaphysis which communicates with a partially   fluid-filled irregularly shaped defect in the medial subcutaneous tissues.  No osteomyelitis.    < end of copied text >      --------------------------------------------------------------------------------------------

## 2023-11-11 VITALS
TEMPERATURE: 98 F | RESPIRATION RATE: 18 BRPM | SYSTOLIC BLOOD PRESSURE: 122 MMHG | HEART RATE: 81 BPM | OXYGEN SATURATION: 97 % | DIASTOLIC BLOOD PRESSURE: 63 MMHG

## 2023-11-11 PROCEDURE — 87641 MR-STAPH DNA AMP PROBE: CPT

## 2023-11-11 PROCEDURE — 36415 COLL VENOUS BLD VENIPUNCTURE: CPT

## 2023-11-11 PROCEDURE — 96374 THER/PROPH/DIAG INJ IV PUSH: CPT

## 2023-11-11 PROCEDURE — 87070 CULTURE OTHR SPECIMN AEROBIC: CPT

## 2023-11-11 PROCEDURE — 87040 BLOOD CULTURE FOR BACTERIA: CPT

## 2023-11-11 PROCEDURE — 80053 COMPREHEN METABOLIC PANEL: CPT

## 2023-11-11 PROCEDURE — 82787 IGG 1 2 3 OR 4 EACH: CPT

## 2023-11-11 PROCEDURE — 87389 HIV-1 AG W/HIV-1&-2 AB AG IA: CPT

## 2023-11-11 PROCEDURE — 85652 RBC SED RATE AUTOMATED: CPT

## 2023-11-11 PROCEDURE — 96375 TX/PRO/DX INJ NEW DRUG ADDON: CPT

## 2023-11-11 PROCEDURE — 99232 SBSQ HOSP IP/OBS MODERATE 35: CPT

## 2023-11-11 PROCEDURE — 93971 EXTREMITY STUDY: CPT

## 2023-11-11 PROCEDURE — 86359 T CELLS TOTAL COUNT: CPT

## 2023-11-11 PROCEDURE — 86140 C-REACTIVE PROTEIN: CPT

## 2023-11-11 PROCEDURE — 85027 COMPLETE CBC AUTOMATED: CPT

## 2023-11-11 PROCEDURE — 87077 CULTURE AEROBIC IDENTIFY: CPT

## 2023-11-11 PROCEDURE — 73590 X-RAY EXAM OF LOWER LEG: CPT

## 2023-11-11 PROCEDURE — 73719 MRI LOWER EXTREMITY W/DYE: CPT

## 2023-11-11 PROCEDURE — 99285 EMERGENCY DEPT VISIT HI MDM: CPT

## 2023-11-11 PROCEDURE — 86355 B CELLS TOTAL COUNT: CPT

## 2023-11-11 PROCEDURE — 86360 T CELL ABSOLUTE COUNT/RATIO: CPT

## 2023-11-11 PROCEDURE — 86357 NK CELLS TOTAL COUNT: CPT

## 2023-11-11 PROCEDURE — 80048 BASIC METABOLIC PNL TOTAL CA: CPT

## 2023-11-11 PROCEDURE — 82784 ASSAY IGA/IGD/IGG/IGM EACH: CPT

## 2023-11-11 PROCEDURE — 85025 COMPLETE CBC W/AUTO DIFF WBC: CPT

## 2023-11-11 PROCEDURE — 87205 SMEAR GRAM STAIN: CPT

## 2023-11-11 PROCEDURE — 74177 CT ABD & PELVIS W/CONTRAST: CPT

## 2023-11-11 PROCEDURE — 83605 ASSAY OF LACTIC ACID: CPT

## 2023-11-11 PROCEDURE — 87640 STAPH A DNA AMP PROBE: CPT

## 2023-11-11 PROCEDURE — 87186 SC STD MICRODIL/AGAR DIL: CPT

## 2023-11-11 RX ORDER — CEPHALEXIN 500 MG
1 CAPSULE ORAL
Qty: 24 | Refills: 0
Start: 2023-11-11 | End: 2023-11-16

## 2023-11-11 RX ADMIN — Medication 500 MILLIGRAM(S): at 05:53

## 2023-11-11 RX ADMIN — Medication 500 MILLIGRAM(S): at 11:29

## 2023-11-11 RX ADMIN — Medication 1 EACH: at 11:29

## 2023-11-11 NOTE — PROGRESS NOTE ADULT - SUBJECTIVE AND OBJECTIVE BOX
Patient is a 28y old  Male who presents with a chief complaint of Cellulitis (11 Nov 2023 04:27)      SUBJECTIVE / OVERNIGHT EVENTS: Patient seen and examined at bedside. No acute events overnight. Mild discomfort of right leg. Okay with going home, asking me "why do you come so early?"    MEDICATIONS  (STANDING):  cephalexin 500 milliGRAM(s) Oral four times a day  chlorhexidine 2% Cloths 1 Application(s) Topical daily  melatonin 3 milliGRAM(s) Oral at bedtime  nicotine - Inhaler 1 Each Inhalation daily    MEDICATIONS  (PRN):  acetaminophen     Tablet .. 650 milliGRAM(s) Oral every 6 hours PRN Temp greater or equal to 38C (100.4F), Mild Pain (1 - 3), Moderate Pain (4 - 6), Severe Pain (7 - 10)      CAPILLARY BLOOD GLUCOSE        I&O's Summary      PHYSICAL EXAM:  Vital Signs Last 24 Hrs  T(C): 36.6 (11 Nov 2023 08:34), Max: 37.3 (10 Nov 2023 23:48)  T(F): 97.9 (11 Nov 2023 08:34), Max: 99.1 (10 Nov 2023 23:48)  HR: 81 (11 Nov 2023 08:34) (78 - 110)  BP: 122/63 (11 Nov 2023 08:34) (122/63 - 143/89)  BP(mean): --  RR: 18 (11 Nov 2023 08:34) (18 - 18)  SpO2: 97% (11 Nov 2023 08:34) (97% - 98%)    Parameters below as of 11 Nov 2023 08:34  Patient On (Oxygen Delivery Method): room air        GEN: male in NAD, appears comfortable, no diaphoresis  EYES: No scleral injection, EOMI  ENTM: neck supple & symmetric without tracheal deviation, moist membranes, no gross hearing impairment, thyroid gland not enlarged  CV: +S1/S2, no m/r/g, no abdominal bruit, no LE edema  RESP: breathing comfortably, no respiratory accessory muscle use, CTAB, no w/r/r  GI: normoactive BS, soft, NTND, no rebounding/guarding, no palpable masses    LABS:                      RADIOLOGY & ADDITIONAL TESTS:  Results Reviewed:   Imaging Personally Reviewed:  Electrocardiogram Personally Reviewed:    COORDINATION OF CARE:  Care Discussed with Consultants/Other Providers [Y/N]:  Prior or Outpatient Records Reviewed [Y/N]:

## 2023-11-11 NOTE — PROGRESS NOTE ADULT - PROBLEM SELECTOR PLAN 1
On exam patient has significant purple discoloration of the leg with good distal perfusion with intact cap refill, warm, intact pulses. He has an open wound on the lateral aspect of the mid calf with surrounding discoloration and tissue necrosis with mild purulent discharge.  Xray without evidence of osteomyelitis as above. Low suspicion for arterial insufficiency given good perfusion on exam, chronicity of symptoms. Duplex neg for DVT    - ID consult appreciated, monitor off antibiotics  - Send IgG subset, immunoglobulins, and HIV screening  - MRI right leg  - Derm consult  - Tylenol PRN pain
On exam patient has significant purple discoloration of the leg with good distal perfusion with intact cap refill, warm, intact pulses. He has an open wound on the lateral aspect of the mid calf with surrounding discoloration and tissue necrosis with mild purulent discharge. Xray without evidence of osteomyelitis as above. Low suspicion for arterial insufficiency given good perfusion on exam, chronicity of symptoms. Duplex neg for DVT    - ID consult appreciated, monitor off antibiotics  - Send IgG subset, immunoglobulins, and HIV screening  - MRI right leg  - Derm consult & they believe "Traumatic ulceration with background chronic lipodermatosclerosis 2/2 stasis dermatitis"  - Tylenol PRN pain  - Wound care consult appreciated  - CT A&P w/o pathology to explain right leg swelling, some LAD  - Ultimately will require OP follow up with vascular and wound care
On exam patient has significant purple discoloration of the leg with good distal perfusion with intact cap refill, warm, intact pulses. He has an open wound on the lateral aspect of the mid calf with surrounding discoloration and tissue necrosis with mild purulent discharge. Xray without evidence of osteomyelitis as above. Low suspicion for arterial insufficiency given good perfusion on exam, chronicity of symptoms. Duplex neg for DVT    - ID consult appreciated  - Send IgG subset, immunoglobulins, and HIV screening --> all negative  - MRI right leg which showed subcutaneous wound, small collection, no osteo  - Derm consult & they believe "Traumatic ulceration with background chronic lipodermatosclerosis 2/2 stasis dermatitis"  - Tylenol PRN pain  - Wound care consult appreciated  - CT A&P w/o pathology to explain right leg swelling, some LAD  - Ultimately will require OP follow up with vascular and wound care  - Vasc consulted for MRI, if no intervention plan to DC on Keflex
On exam patient has significant purple discoloration of the leg with good distal perfusion with intact cap refill, warm, intact pulses. He has an open wound on the lateral aspect of the mid calf with surrounding discoloration and tissue necrosis with mild purulent discharge. Xray without evidence of osteomyelitis as above. Low suspicion for arterial insufficiency given good perfusion on exam, chronicity of symptoms. Duplex neg for DVT    - ID consult appreciated  - Send IgG subset, immunoglobulins, and HIV screening --> all negative/WNL  - MRI right leg which showed subcutaneous wound, small collection, no osteo  - Derm consult & they believe "Traumatic ulceration with background chronic lipodermatosclerosis 2/2 stasis dermatitis"  - Tylenol PRN pain  - Wound care consult appreciated  - CT A&P w/o pathology to explain right leg swelling, some LAD  - Ultimately will require OP follow up with vascular and wound care  - Surgery consulted for MR and no intervention  - Will DC on Keflex for 7 days

## 2023-11-11 NOTE — PROGRESS NOTE ADULT - PROBLEM SELECTOR PLAN 2
Encourage ambulation.     Smoker- 1ppd for 10 years- smoking cessation counseling

## 2023-11-11 NOTE — PROGRESS NOTE ADULT - NSPROGADDITIONALINFOA_GEN_ALL_CORE
Possible DC today with OP follow up    Discharge Time: 35 minutes
Possible DC today pending MRI
Ready for DC    Visiting RN established    OP follow up with vascular, wound care, and ID

## 2023-11-11 NOTE — PROGRESS NOTE ADULT - PROVIDER SPECIALTY LIST ADULT
Infectious Disease
Infectious Disease
Internal Medicine
Infectious Disease

## 2023-11-12 LAB
CULTURE RESULTS: ABNORMAL
CULTURE RESULTS: SIGNIFICANT CHANGE UP
ORGANISM # SPEC MICROSCOPIC CNT: ABNORMAL
SPECIMEN SOURCE: SIGNIFICANT CHANGE UP

## 2023-11-13 ENCOUNTER — NON-APPOINTMENT (OUTPATIENT)
Age: 28
End: 2023-11-13

## 2023-11-14 ENCOUNTER — APPOINTMENT (OUTPATIENT)
Dept: DERMATOLOGY | Facility: CLINIC | Age: 28
End: 2023-11-14
Payer: MEDICAID

## 2023-11-14 PROCEDURE — 99215 OFFICE O/P EST HI 40 MIN: CPT

## 2023-11-15 ENCOUNTER — APPOINTMENT (OUTPATIENT)
Dept: WOUND CARE | Facility: HOSPITAL | Age: 28
End: 2023-11-15

## 2023-11-17 DIAGNOSIS — F41.1 GENERALIZED ANXIETY DISORDER: ICD-10-CM

## 2023-11-20 ENCOUNTER — APPOINTMENT (OUTPATIENT)
Dept: DERMATOLOGY | Facility: CLINIC | Age: 28
End: 2023-11-20

## 2023-11-27 ENCOUNTER — NON-APPOINTMENT (OUTPATIENT)
Age: 28
End: 2023-11-27

## 2023-11-28 ENCOUNTER — INPATIENT (INPATIENT)
Facility: HOSPITAL | Age: 28
LOS: 2 days | Discharge: HOME | DRG: 264 | End: 2023-12-01
Attending: STUDENT IN AN ORGANIZED HEALTH CARE EDUCATION/TRAINING PROGRAM | Admitting: STUDENT IN AN ORGANIZED HEALTH CARE EDUCATION/TRAINING PROGRAM
Payer: MEDICAID

## 2023-11-28 VITALS
HEIGHT: 71 IN | OXYGEN SATURATION: 99 % | RESPIRATION RATE: 17 BRPM | DIASTOLIC BLOOD PRESSURE: 82 MMHG | TEMPERATURE: 97 F | HEART RATE: 107 BPM | WEIGHT: 199.96 LBS | SYSTOLIC BLOOD PRESSURE: 147 MMHG

## 2023-11-28 DIAGNOSIS — S81.802A UNSPECIFIED OPEN WOUND, LEFT LOWER LEG, INITIAL ENCOUNTER: ICD-10-CM

## 2023-11-28 DIAGNOSIS — S81.801A UNSPECIFIED OPEN WOUND, RIGHT LOWER LEG, INITIAL ENCOUNTER: ICD-10-CM

## 2023-11-28 LAB
ALBUMIN SERPL ELPH-MCNC: 4.9 G/DL — SIGNIFICANT CHANGE UP (ref 3.3–5)
ALP SERPL-CCNC: 86 U/L — SIGNIFICANT CHANGE UP (ref 40–120)
ALT FLD-CCNC: 11 U/L — SIGNIFICANT CHANGE UP (ref 10–45)
ANION GAP SERPL CALC-SCNC: 15 MMOL/L — SIGNIFICANT CHANGE UP (ref 5–17)
APTT BLD: 31.3 SEC — SIGNIFICANT CHANGE UP (ref 24.5–35.6)
AST SERPL-CCNC: 16 U/L — SIGNIFICANT CHANGE UP (ref 10–40)
BASE EXCESS BLDV CALC-SCNC: 1.7 MMOL/L — SIGNIFICANT CHANGE UP (ref -2–3)
BASOPHILS # BLD AUTO: 0.05 K/UL — SIGNIFICANT CHANGE UP (ref 0–0.2)
BASOPHILS NFR BLD AUTO: 0.5 % — SIGNIFICANT CHANGE UP (ref 0–2)
BILIRUB SERPL-MCNC: 0.3 MG/DL — SIGNIFICANT CHANGE UP (ref 0.2–1.2)
BUN SERPL-MCNC: 19 MG/DL — SIGNIFICANT CHANGE UP (ref 7–23)
CA-I SERPL-SCNC: 1.23 MMOL/L — SIGNIFICANT CHANGE UP (ref 1.15–1.33)
CALCIUM SERPL-MCNC: 9.7 MG/DL — SIGNIFICANT CHANGE UP (ref 8.4–10.5)
CHLORIDE BLDV-SCNC: 103 MMOL/L — SIGNIFICANT CHANGE UP (ref 96–108)
CHLORIDE SERPL-SCNC: 105 MMOL/L — SIGNIFICANT CHANGE UP (ref 96–108)
CO2 BLDV-SCNC: 31 MMOL/L — HIGH (ref 22–26)
CO2 SERPL-SCNC: 23 MMOL/L — SIGNIFICANT CHANGE UP (ref 22–31)
CREAT SERPL-MCNC: 0.89 MG/DL — SIGNIFICANT CHANGE UP (ref 0.5–1.3)
EGFR: 120 ML/MIN/1.73M2 — SIGNIFICANT CHANGE UP
EOSINOPHIL # BLD AUTO: 0.39 K/UL — SIGNIFICANT CHANGE UP (ref 0–0.5)
EOSINOPHIL NFR BLD AUTO: 4.1 % — SIGNIFICANT CHANGE UP (ref 0–6)
GAS PNL BLDV: 136 MMOL/L — SIGNIFICANT CHANGE UP (ref 136–145)
GAS PNL BLDV: SIGNIFICANT CHANGE UP
GLUCOSE BLDV-MCNC: 96 MG/DL — SIGNIFICANT CHANGE UP (ref 70–99)
GLUCOSE SERPL-MCNC: 101 MG/DL — HIGH (ref 70–99)
HCO3 BLDV-SCNC: 29 MMOL/L — SIGNIFICANT CHANGE UP (ref 22–29)
HCT VFR BLD CALC: 43.4 % — SIGNIFICANT CHANGE UP (ref 39–50)
HCT VFR BLDA CALC: 45 % — SIGNIFICANT CHANGE UP (ref 39–51)
HGB BLD CALC-MCNC: 14.9 G/DL — SIGNIFICANT CHANGE UP (ref 12.6–17.4)
HGB BLD-MCNC: 14.4 G/DL — SIGNIFICANT CHANGE UP (ref 13–17)
IMM GRANULOCYTES NFR BLD AUTO: 0.3 % — SIGNIFICANT CHANGE UP (ref 0–0.9)
INR BLD: 1.02 RATIO — SIGNIFICANT CHANGE UP (ref 0.85–1.18)
LACTATE BLDV-MCNC: 1.2 MMOL/L — SIGNIFICANT CHANGE UP (ref 0.5–2)
LYMPHOCYTES # BLD AUTO: 2.94 K/UL — SIGNIFICANT CHANGE UP (ref 1–3.3)
LYMPHOCYTES # BLD AUTO: 31 % — SIGNIFICANT CHANGE UP (ref 13–44)
MAGNESIUM SERPL-MCNC: 2.1 MG/DL — SIGNIFICANT CHANGE UP (ref 1.6–2.6)
MCHC RBC-ENTMCNC: 28.9 PG — SIGNIFICANT CHANGE UP (ref 27–34)
MCHC RBC-ENTMCNC: 33.2 GM/DL — SIGNIFICANT CHANGE UP (ref 32–36)
MCV RBC AUTO: 87.1 FL — SIGNIFICANT CHANGE UP (ref 80–100)
MONOCYTES # BLD AUTO: 0.65 K/UL — SIGNIFICANT CHANGE UP (ref 0–0.9)
MONOCYTES NFR BLD AUTO: 6.9 % — SIGNIFICANT CHANGE UP (ref 2–14)
NEUTROPHILS # BLD AUTO: 5.41 K/UL — SIGNIFICANT CHANGE UP (ref 1.8–7.4)
NEUTROPHILS NFR BLD AUTO: 57.2 % — SIGNIFICANT CHANGE UP (ref 43–77)
NRBC # BLD: 0 /100 WBCS — SIGNIFICANT CHANGE UP (ref 0–0)
PCO2 BLDV: 55 MMHG — SIGNIFICANT CHANGE UP (ref 42–55)
PH BLDV: 7.33 — SIGNIFICANT CHANGE UP (ref 7.32–7.43)
PHOSPHATE SERPL-MCNC: 3.5 MG/DL — SIGNIFICANT CHANGE UP (ref 2.5–4.5)
PLATELET # BLD AUTO: 356 K/UL — SIGNIFICANT CHANGE UP (ref 150–400)
PO2 BLDV: 33 MMHG — SIGNIFICANT CHANGE UP (ref 25–45)
POTASSIUM BLDV-SCNC: 3.6 MMOL/L — SIGNIFICANT CHANGE UP (ref 3.5–5.1)
POTASSIUM SERPL-MCNC: 3.8 MMOL/L — SIGNIFICANT CHANGE UP (ref 3.5–5.3)
POTASSIUM SERPL-SCNC: 3.8 MMOL/L — SIGNIFICANT CHANGE UP (ref 3.5–5.3)
PROT SERPL-MCNC: 7.9 G/DL — SIGNIFICANT CHANGE UP (ref 6–8.3)
PROTHROM AB SERPL-ACNC: 10.7 SEC — SIGNIFICANT CHANGE UP (ref 9.5–13)
RBC # BLD: 4.98 M/UL — SIGNIFICANT CHANGE UP (ref 4.2–5.8)
RBC # FLD: 13 % — SIGNIFICANT CHANGE UP (ref 10.3–14.5)
SAO2 % BLDV: 37.4 % — LOW (ref 67–88)
SODIUM SERPL-SCNC: 143 MMOL/L — SIGNIFICANT CHANGE UP (ref 135–145)
WBC # BLD: 9.47 K/UL — SIGNIFICANT CHANGE UP (ref 3.8–10.5)
WBC # FLD AUTO: 9.47 K/UL — SIGNIFICANT CHANGE UP (ref 3.8–10.5)

## 2023-11-28 PROCEDURE — 73590 X-RAY EXAM OF LOWER LEG: CPT | Mod: 26,RT

## 2023-11-28 PROCEDURE — 99223 1ST HOSP IP/OBS HIGH 75: CPT | Mod: GC

## 2023-11-28 PROCEDURE — 99285 EMERGENCY DEPT VISIT HI MDM: CPT

## 2023-11-28 PROCEDURE — 93971 EXTREMITY STUDY: CPT | Mod: 26,RT

## 2023-11-28 PROCEDURE — 99222 1ST HOSP IP/OBS MODERATE 55: CPT

## 2023-11-28 RX ORDER — CEFAZOLIN SODIUM 1 G
1000 VIAL (EA) INJECTION ONCE
Refills: 0 | Status: COMPLETED | OUTPATIENT
Start: 2023-11-28 | End: 2023-11-28

## 2023-11-28 RX ORDER — CEFAZOLIN SODIUM 1 G
1000 VIAL (EA) INJECTION EVERY 8 HOURS
Refills: 0 | Status: DISCONTINUED | OUTPATIENT
Start: 2023-11-28 | End: 2023-11-30

## 2023-11-28 RX ORDER — KETOROLAC TROMETHAMINE 30 MG/ML
15 SYRINGE (ML) INJECTION ONCE
Refills: 0 | Status: DISCONTINUED | OUTPATIENT
Start: 2023-11-28 | End: 2023-11-28

## 2023-11-28 RX ORDER — ACETAMINOPHEN 500 MG
650 TABLET ORAL EVERY 6 HOURS
Refills: 0 | Status: DISCONTINUED | OUTPATIENT
Start: 2023-11-28 | End: 2023-12-01

## 2023-11-28 RX ORDER — SODIUM HYPOCHLORITE 0.125 %
1 SOLUTION, NON-ORAL MISCELLANEOUS DAILY
Refills: 0 | Status: DISCONTINUED | OUTPATIENT
Start: 2023-11-28 | End: 2023-12-01

## 2023-11-28 RX ADMIN — Medication 15 MILLIGRAM(S): at 09:50

## 2023-11-28 RX ADMIN — Medication 100 MILLIGRAM(S): at 09:34

## 2023-11-28 RX ADMIN — Medication 1 APPLICATION(S): at 18:50

## 2023-11-28 RX ADMIN — Medication 100 MILLIGRAM(S): at 22:17

## 2023-11-28 RX ADMIN — Medication 15 MILLIGRAM(S): at 10:55

## 2023-11-28 NOTE — H&P ADULT - HISTORY OF PRESENT ILLNESS
28 y.o male with PMHX of  polysubstance abuse presenting with worsening right lower extremity wound.  Patient was discharged home on Keflex, which he recently completed a few days ago.  Patient was evaluated by home care nurse who advised him to return to the emergency room due to worsening wound.  Patient denies fever, chills, nausea and vomiting.  Patient states that wound appears to be worsening.  Denies chest pain, shortness of breath, abdominal pain.    In the ED s/p Cefazolin. and Toradol 15 x1.

## 2023-11-28 NOTE — ED PROVIDER NOTE - PHYSICAL EXAMINATION
Const: not in acute distress  Eyes: no conjunctival injection  HEENT: Head NCAT, Moist MM.  Neck: Trachea midline.   CVS: +S1/S2, Peripheral pulses 2+ and equal in all extremities.  RESP: Unlabored respiratory effort. Clear to auscultation bilaterally.  GI: Nontender/Nondistended, No CVA tenderness b/l.   MSK: Normocephalic/Atraumatic, No Lower Extremities edema b/l.   Skin: erythema of RLE with necrotic appearing circular wound.   Neuro: Motor & Sensation grossly intact.  Psych: Awake, Alert, & Cooperative

## 2023-11-28 NOTE — CONSULT NOTE ADULT - ATTENDING SUPERVISION STATEMENT
Problem: General Rehab Plan of Care  Goal: Therapeutic Recreation/Music Therapy Goal  Note:   Attended full hour of morning music therapy group.  Interventions focused on developing insight and coping skills.  Pt participated by engaging in song discussion and contributing to group list of coping skills. Pt had a positive attitude and demonstrated good insight and gave good ideas to the group.  Pt appeared tired and fell asleep listening to music following the group activity.  Pleasant and cooperative.    Pt also attended a full hour of afternoon music therapy group with the focus of relaxation and improving mood.  Pt chose to listen to self-selected music and play a piano game on an ipod.  Pt was more awake and brighter in the afternoon group. Pleasant and cooperative throughout.       Resident

## 2023-11-28 NOTE — CONSULT NOTE ADULT - SUBJECTIVE AND OBJECTIVE BOX
Consultation Requested by:    Patient is a 28y old  Male who presents with a chief complaint of Worsening Lower extremity wound (28 Nov 2023 12:52)    HPI:  28 y.o male with PMHX of  polysubstance abuse presenting with worsening right lower extremity wound.  Patient was discharged home on Keflex, which he recently completed a few days ago.  Patient was evaluated by home care nurse who advised him to return to the emergency room due to worsening wound.  Patient denies fever, chills, nausea and vomiting.  Patient states that wound appears to be worsening.  Denies chest pain, shortness of breath, abdominal pain.    In the ED s/p Cefazolin. and Toradol 15 x1.  (28 Nov 2023 12:52)      REVIEW OF SYSTEMS  All review of systems negative, except for those marked:  General:		[] Abnormal:  	[] Night Sweats		[] Fever		[] Weight Loss  Pulmonary/Cough:	[] Abnormal:  Cardiac/Chest Pain:	[] Abnormal:  Gastrointestinal:	[] Abnormal:  Eyes:			[] Abnormal:  ENT:			[] Abnormal:  Dysuria:		[] Abnormal:  Musculoskeletal	:	[] Abnormal:  Endocrine:		[] Abnormal:  Lymph Nodes:		[] Abnormal:  Headache:		[] Abnormal:  Skin:			[] Abnormal:  Allergy/Immune:	[] Abnormal:  Psychiatric:		[] Abnormal:  [] All other review of systems negative  [] Unable to obtain (explain):    Recent Ill Contacts:	[] No	[] Yes:  Recent Travel History:	[] No	[] Yes:  Recent Animal/Insect Exposure/Tick Bites:	[] No	[] Yes:    Allergies    Kiwi (Hives; Swelling)  avocado (Hives; Swelling)  No Known Drug Allergies    Intolerances      Antimicrobials:  ceFAZolin   IVPB 1000 milliGRAM(s) IV Intermittent every 8 hours      Other Medications:  acetaminophen     Tablet .. 650 milliGRAM(s) Oral every 6 hours PRN      FAMILY HISTORY:    PAST MEDICAL & SURGICAL HISTORY:  Venous insufficiency      Polysubstance abuse        SOCIAL HISTORY:    IMMUNIZATIONS  [] Up to Date		[] Not Up to Date:  Recent Immunizations:	[] No	[] Yes:    Daily Height in cm: 180.34 (28 Nov 2023 07:26)    Daily   Head Circumference:  Vital Signs Last 24 Hrs  T(C): 36.6 (28 Nov 2023 11:49), Max: 37.3 (28 Nov 2023 08:20)  T(F): 97.8 (28 Nov 2023 11:49), Max: 99.1 (28 Nov 2023 08:20)  HR: 69 (28 Nov 2023 11:49) (69 - 107)  BP: 141/87 (28 Nov 2023 11:49) (141/87 - 147/82)  BP(mean): 98 (28 Nov 2023 11:49) (98 - 98)  RR: 16 (28 Nov 2023 11:49) (16 - 18)  SpO2: 100% (28 Nov 2023 11:49) (99% - 100%)    Parameters below as of 28 Nov 2023 11:49  Patient On (Oxygen Delivery Method): room air        PHYSICAL EXAM  All physical exam findings normal, except for those marked:  General:	Normal: alert, neither acutely nor chronically ill-appearing, well developed/well   .		nourished, no respiratory distress  .		[] Abnormal:  Eyes		Normal: no conjunctival injection, no discharge, no photophobia, intact   .		extraocular movements, sclera not icteric  .		[] Abnormal:  ENT:		Normal: normal tympanic membranes; external ear normal, nares normal without   .		discharge, no pharyngeal erythema or exudates, no oral mucosal lesions, normal   .		tongue and lips  .		[] Abnormal:  Neck		Normal: supple, full range of motion, no nuchal rigidity  .		[] Abnormal:  Lymph Nodes	Normal: normal size and consistency, non-tender  .		[] Abnormal:  Cardiovascular	Normal: regular rate and variability; Normal S1, S2; No murmur  .		[] Abnormal:  Respiratory	Normal: no wheezing or crackles, bilateral audible breath sounds, no retractions  .		[] Abnormal:  Abdominal	Normal: soft; non-distended; non-tender; no hepatosplenomegaly or masses  .		[] Abnormal:  		Normal: normal external genitalia, no rash  .		[] Abnormal:  Extremities	Normal: FROM x4, no cyanosis or edema, symmetric pulses  .		[] Abnormal:  Skin		Normal: skin intact and not indurated; no rash, no desquamation  .		[] Abnormal:  Neurologic	Normal: alert, oriented as age-appropriate, affect appropriate; no weakness, no   .		facial asymmetry, moves all extremities, normal gait-child older than 18 months  .		[] Abnormal:  Musculoskeletal		Normal: no joint swelling, erythema, or tenderness; full range of motion   .			with no contractures; no muscle tenderness; no clubbing; no cyanosis;   .			no edema  .			[] Abnormal    Respiratory Support:		[] No	[] Yes:  Vasoactive medication infusion:	[] No	[] Yes:  Venous catheters:		[] No	[] Yes:  Bladder catheter:		[] No	[] Yes:  Other catheters or tubes:	[] No	[] Yes:    Lab Results:                        14.4   9.47  )-----------( 356      ( 28 Nov 2023 09:17 )             43.4     11-28    143  |  105  |  19  ----------------------------<  101<H>  3.8   |  23  |  0.89    Ca    9.7      28 Nov 2023 09:17  Phos  3.5     11-28  Mg     2.1     11-28    TPro  7.9  /  Alb  4.9  /  TBili  0.3  /  DBili  x   /  AST  16  /  ALT  11  /  AlkPhos  86  11-28    LIVER FUNCTIONS - ( 28 Nov 2023 09:17 )  Alb: 4.9 g/dL / Pro: 7.9 g/dL / ALK PHOS: 86 U/L / ALT: 11 U/L / AST: 16 U/L / GGT: x           PT/INR - ( 28 Nov 2023 09:17 )   PT: 10.7 sec;   INR: 1.02 ratio         PTT - ( 28 Nov 2023 09:17 )  PTT:31.3 sec  Urinalysis Basic - ( 28 Nov 2023 09:17 )    Color: x / Appearance: x / SG: x / pH: x  Gluc: 101 mg/dL / Ketone: x  / Bili: x / Urobili: x   Blood: x / Protein: x / Nitrite: x   Leuk Esterase: x / RBC: x / WBC x   Sq Epi: x / Non Sq Epi: x / Bacteria: x        MICROBIOLOGY   Consultation Requested by:    Patient is a 28y old  Male who presents with a chief complaint of Worsening Lower extremity wound (28 Nov 2023 12:52)    HPI:  28 y.o male with PMHX of  polysubstance abuse presenting with worsening right lower extremity wound.  Patient was discharged home on Keflex, which he recently completed a few days ago.  Patient was evaluated by home care nurse who advised him to return to the emergency room due to worsening wound.  Patient denies fever, chills, nausea and vomiting.  Patient states that wound appears to be worsening.  Denies chest pain, shortness of breath, abdominal pain.    In the ED s/p Cefazolin. and Toradol 15 x1.  (28 Nov 2023 12:52)      REVIEW OF SYSTEMS  All review of systems negative, except for those marked:  General:		[] Abnormal:  	[] Night Sweats		[] Fever		[] Weight Loss  Pulmonary/Cough:	[] Abnormal:  Cardiac/Chest Pain:	[] Abnormal:  Gastrointestinal:	[] Abnormal:  Eyes:			[] Abnormal:  ENT:			[] Abnormal:  Dysuria:		[] Abnormal:  Musculoskeletal	:	[] Abnormal:  Endocrine:		[] Abnormal:  Lymph Nodes:		[] Abnormal:  Headache:		[] Abnormal:  Skin:			[x] Abnormal:  Allergy/Immune:	[] Abnormal:  Psychiatric:		[] Abnormal:  [] All other review of systems negative  [] Unable to obtain (explain):    Recent Ill Contacts:	[] No	[] Yes:  Recent Travel History:	[] No	[] Yes:  Recent Animal/Insect Exposure/Tick Bites:	[] No	[] Yes:    Allergies    Kiwi (Hives; Swelling)  avocado (Hives; Swelling)  No Known Drug Allergies    Intolerances      Antimicrobials:  ceFAZolin   IVPB 1000 milliGRAM(s) IV Intermittent every 8 hours      Other Medications:  acetaminophen     Tablet .. 650 milliGRAM(s) Oral every 6 hours PRN      FAMILY HISTORY:    PAST MEDICAL & SURGICAL HISTORY:  Venous insufficiency      Polysubstance abuse        SOCIAL HISTORY:    IMMUNIZATIONS  [] Up to Date		[] Not Up to Date:  Recent Immunizations:	[] No	[] Yes:    Daily Height in cm: 180.34 (28 Nov 2023 07:26)    Daily   Head Circumference:  Vital Signs Last 24 Hrs  T(C): 36.6 (28 Nov 2023 11:49), Max: 37.3 (28 Nov 2023 08:20)  T(F): 97.8 (28 Nov 2023 11:49), Max: 99.1 (28 Nov 2023 08:20)  HR: 69 (28 Nov 2023 11:49) (69 - 107)  BP: 141/87 (28 Nov 2023 11:49) (141/87 - 147/82)  BP(mean): 98 (28 Nov 2023 11:49) (98 - 98)  RR: 16 (28 Nov 2023 11:49) (16 - 18)  SpO2: 100% (28 Nov 2023 11:49) (99% - 100%)    Parameters below as of 28 Nov 2023 11:49  Patient On (Oxygen Delivery Method): room air        PHYSICAL EXAM  All physical exam findings normal, except for those marked:  General:	Normal: alert, neither acutely nor chronically ill-appearing, well developed/well   .		nourished, no respiratory distress  .		[] Abnormal:  Eyes		Normal: no conjunctival injection, no discharge, no photophobia, intact   .		extraocular movements, sclera not icteric  .		[] Abnormal:  ENT:		Normal: normal tympanic membranes; external ear normal, nares normal without   .		discharge, no pharyngeal erythema or exudates, no oral mucosal lesions, normal   .		tongue and lips  .		[] Abnormal:  Neck		Normal: supple, full range of motion, no nuchal rigidity  .		[] Abnormal:  Lymph Nodes	Normal: normal size and consistency, non-tender  .		[] Abnormal:  Cardiovascular	Normal: regular rate and variability; Normal S1, S2; No murmur  .		[] Abnormal:  Respiratory	Normal: no wheezing or crackles, bilateral audible breath sounds, no retractions  .		[] Abnormal:  Abdominal	Normal: soft; non-distended; non-tender; no hepatosplenomegaly or masses  .		[] Abnormal:  		Normal: normal external genitalia, no rash  .		[] Abnormal:  Extremities	Normal: FROM x4, no cyanosis or edema, symmetric pulses  .		[] Abnormal:  Skin		Necrotic wound to the distal right LE overlying the distal tib fib. Mild circumferential erythema to the surrounding area and skin changes consistent with stasis dermatitis. Scant Malodorous discharge.   .		[x] Abnormal:  Neurologic	Normal: alert, oriented as age-appropriate, affect appropriate; no weakness, no   .		facial asymmetry, moves all extremities, normal gait-child older than 18 months  .		[] Abnormal:  Musculoskeletal		Normal: no joint swelling, erythema, or tenderness; full range of motion   .			with no contractures; no muscle tenderness; no clubbing; no cyanosis;   .			no edema  .			[] Abnormal    Respiratory Support:		[] No	[] Yes:  Vasoactive medication infusion:	[] No	[] Yes:  Venous catheters:		[] No	[] Yes:  Bladder catheter:		[] No	[] Yes:  Other catheters or tubes:	[] No	[] Yes:    Lab Results:                        14.4   9.47  )-----------( 356      ( 28 Nov 2023 09:17 )             43.4     11-28    143  |  105  |  19  ----------------------------<  101<H>  3.8   |  23  |  0.89    Ca    9.7      28 Nov 2023 09:17  Phos  3.5     11-28  Mg     2.1     11-28    TPro  7.9  /  Alb  4.9  /  TBili  0.3  /  DBili  x   /  AST  16  /  ALT  11  /  AlkPhos  86  11-28    LIVER FUNCTIONS - ( 28 Nov 2023 09:17 )  Alb: 4.9 g/dL / Pro: 7.9 g/dL / ALK PHOS: 86 U/L / ALT: 11 U/L / AST: 16 U/L / GGT: x           PT/INR - ( 28 Nov 2023 09:17 )   PT: 10.7 sec;   INR: 1.02 ratio         PTT - ( 28 Nov 2023 09:17 )  PTT:31.3 sec  Urinalysis Basic - ( 28 Nov 2023 09:17 )    Color: x / Appearance: x / SG: x / pH: x  Gluc: 101 mg/dL / Ketone: x  / Bili: x / Urobili: x   Blood: x / Protein: x / Nitrite: x   Leuk Esterase: x / RBC: x / WBC x   Sq Epi: x / Non Sq Epi: x / Bacteria: x        MICROBIOLOGY: Reviewed

## 2023-11-28 NOTE — ED ADULT NURSE REASSESSMENT NOTE - NS ED NURSE REASSESS COMMENT FT1
Report received from FLORY Cadet. Pt a&ox4, able to follow commands. Breathing spontaneous & nonlabored. Abdomen soft & nondistended. IV site patent, no signs of phlebitis, flushing without difficulty. Call bell within reach, bed in lowest position. Dispo pending. Report received from FLOYR Cadet. Pt a&ox4, able to follow commands. Breathing spontaneous & nonlabored. Abdomen soft & nondistended. IV site patent, no signs of phlebitis, flushing without difficulty. Call bell within reach, bed in lowest position. Dispo pending.

## 2023-11-28 NOTE — H&P ADULT - NSHPREVIEWOFSYSTEMS_GEN_ALL_CORE
REVIEW OF SYSTEMS:  CONSTITUTIONAL: No weakness, fevers or chills  EYES: no blurry vision or eye pain.   ENT: No throat pain. No dysphagia.    NECK: No pain or stiffness  RESPIRATORY: No cough, wheezing, hemoptysis; No shortness of breath  CARDIOVASCULAR: No chest pain or palpitations.  GASTROINTESTINAL: No abdominal pain. No nausea or vomiting; No diarrhea or constipation. No melena or hematochezia.  GENITOURINARY: No dysuria, frequency or hematuria  NEUROLOGICAL: No numbness or weakness. No dizziness or falls.   SKIN: No itching, burning, rashes, or lesions.   LYMPHATIC: No masses or swelling.   All other review of systems is negative unless indicated above.

## 2023-11-28 NOTE — ED ADULT NURSE NOTE - NS PRO AD NO ADVANCE DIRECTIVE
[employer] called and voicemail left for patient asking them to return [employer] call. The call is in regards to results of NM Bone scan.     IMPRESSION:  No scintigraphic evidence of osseous metastasis         No

## 2023-11-28 NOTE — CONSULT NOTE ADULT - SUBJECTIVE AND OBJECTIVE BOX
HPI: 28 y.o male with PMHX of  polysubstance abuse presenting with worsening right lower extremity wound.  Patient was discharged home on Keflex, which he recently completed a few days ago.  Patient was evaluated by home care nurse who advised him to return to the emergency room due to worsening wound.  Patient denies fever, chills, nausea and vomiting.  Patient states that wound appears to be worsening.  Denies chest pain, shortness of breath, abdominal pain.        PAST MEDICAL & SURGICAL HISTORY:  Venous insufficiency      Polysubstance abuse          MEDICATIONS  (STANDING):  ceFAZolin   IVPB 1000 milliGRAM(s) IV Intermittent every 8 hours    MEDICATIONS  (PRN):  acetaminophen     Tablet .. 650 milliGRAM(s) Oral every 6 hours PRN Moderate Pain (4 - 6)      Allergies    Kiwi (Hives; Swelling)  avocado (Hives; Swelling)  No Known Drug Allergies    Intolerances        SOCIAL HISTORY:    FAMILY HISTORY:          Physical Exam:  General: NAD, resting comfortably  HEENT: NC/AT, EOMI, normal hearing, no oral lesions, no LAD, neck supple  Pulmonary: normal resp effort, CTA-B  Cardiovascular: NSR, no murmurs  Abdominal: soft, ND/NT, no organomegaly  Extremities: WWP, normal strength, no clubbing/cyanosis/edema  Neuro: A/O x 3, CNs II-XII grossly intact, normal sensation, no focal deficits  Pulses: palpable distal pulses    Vital Signs Last 24 Hrs  T(C): 36.6 (28 Nov 2023 11:49), Max: 37.3 (28 Nov 2023 08:20)  T(F): 97.8 (28 Nov 2023 11:49), Max: 99.1 (28 Nov 2023 08:20)  HR: 69 (28 Nov 2023 11:49) (69 - 107)  BP: 141/87 (28 Nov 2023 11:49) (141/87 - 147/82)  BP(mean): 98 (28 Nov 2023 11:49) (98 - 98)  RR: 16 (28 Nov 2023 11:49) (16 - 18)  SpO2: 100% (28 Nov 2023 11:49) (99% - 100%)    Parameters below as of 28 Nov 2023 11:49  Patient On (Oxygen Delivery Method): room air        I&O's Summary          LABS:                        14.4   9.47  )-----------( 356      ( 28 Nov 2023 09:17 )             43.4     11-28    143  |  105  |  19  ----------------------------<  101<H>  3.8   |  23  |  0.89    Ca    9.7      28 Nov 2023 09:17  Phos  3.5     11-28  Mg     2.1     11-28    TPro  7.9  /  Alb  4.9  /  TBili  0.3  /  DBili  x   /  AST  16  /  ALT  11  /  AlkPhos  86  11-28    PT/INR - ( 28 Nov 2023 09:17 )   PT: 10.7 sec;   INR: 1.02 ratio         PTT - ( 28 Nov 2023 09:17 )  PTT:31.3 sec  Urinalysis Basic - ( 28 Nov 2023 09:17 )    Color: x / Appearance: x / SG: x / pH: x  Gluc: 101 mg/dL / Ketone: x  / Bili: x / Urobili: x   Blood: x / Protein: x / Nitrite: x   Leuk Esterase: x / RBC: x / WBC x   Sq Epi: x / Non Sq Epi: x / Bacteria: x      CAPILLARY BLOOD GLUCOSE        LIVER FUNCTIONS - ( 28 Nov 2023 09:17 )  Alb: 4.9 g/dL / Pro: 7.9 g/dL / ALK PHOS: 86 U/L / ALT: 11 U/L / AST: 16 U/L / GGT: x             Cultures:      RADIOLOGY & ADDITIONAL STUDIES:      Plan:

## 2023-11-28 NOTE — ED ADULT NURSE NOTE - ED STAT RN HANDOFF DETAILS
Report given to receiving change of shift JACKY BRIZUELA patient is in no acute distress. Patient vital signs stable, plan of care explained.

## 2023-11-28 NOTE — ED PROVIDER NOTE - OBJECTIVE STATEMENT
28-year-old male with past medical history of polysubstance abuse, presenting with right lower calf wound that was worsening.  Patient states that he was was sent home on Keflex, which he recently completed a few days ago.  Patient saw his home health aide, who sent him into the emergency room due to worsening wound.  Patient denies fever, chills, nausea and vomiting.  Patient states that wound appears to be worsening.  Denies chest pain, shortness of breath, abdominal pain.

## 2023-11-28 NOTE — CONSULT NOTE ADULT - ATTENDING COMMENTS
28 y.o man with RLE venous insufficiency, history of trauma to RLE, previous polysubstance abuse admitted 11/28/23 with worsening right lower extremity wound concerning for venous stasis dermatitis ulcer.  Local necrosis with malodour noted on exam.   No gross signs of infection of cellulitis  11/7/23  abscess culture with Staph aureus and Proteus mirabilis   11/9/23 HIV negative    - Recent Dermatology and Surgery input noted. C/f chronic lipodermatosclerosis 2/2 stasis dermatitis  - X-ray without evidence of osteo and previous MRI with sinus  - Previous MRI showing Cutaneous wound along the medial aspect of the calf at the level of the mid tibial diaphysis which communicates with a partially fluid-filled irregularly shaped defect in the medial subcutaneous tissues. No osteomyelitis    Suggest  Local debridement  wound cultures  Please obtain hepatitis C serology  Continue Ancef 11/28 -->

## 2023-11-28 NOTE — ED CLERICAL - DIVISION
[facility]... [facility]... [facility]...

## 2023-11-28 NOTE — CONSULT NOTE ADULT - ASSESSMENT
28 y.o male with PMHX of  polysubstance abuse presenting with worsening right lower extremity wound.  Pt follows with Dr. Kimball outpt for venous insufficiency. Consulted for RLE wound recs  - wound care consult  - Dakins to wound  - will consider chemical vs surgical debridement  - admit to medicine for iv abx

## 2023-11-28 NOTE — ED ADULT NURSE NOTE - OBJECTIVE STATEMENT
29yo M aaox4 h/o venous insufficiency, presents to Ed from home, as per pt since 10/23 started with a wound on the R LLE, he has been on ABx, finished the course 2 days ago,  wound care came to his house and advice pt to came to Ed fro evaluation, pt is schedule for surgery on 1/24, pt endorses pain on the area, on examination R LLE: redness, decoloration,  pain, +pulses, + sensory, +ROM, mild pain Pt denies CP, SOB, HA, vision changes, n/v/d, fevers chills, Safety and comfort measures initiated- bed placed in lowest position and side rails raised. Pt oriented to call bell system. 27yo M aaox4 h/o venous insufficiency, presents to Ed from home, as per pt since 10/23 started with a wound on the R LLE, he has been on ABx, finished the course 2 days ago,  wound care came to his house and advice pt to came to Ed fro evaluation, pt is schedule for surgery on 1/24, pt endorses pain on the area, on examination R LLE: redness, decoloration,  pain, +pulses, + sensory, +ROM, mild pain Pt denies CP, SOB, HA, vision changes, n/v/d, fevers chills, Safety and comfort measures initiated- bed placed in lowest position and side rails raised. Pt oriented to call bell system.

## 2023-11-28 NOTE — H&P ADULT - NSHPADDITIONALINFOADULT_GEN_ALL_CORE
time spent reviewing prior charts, meds, discussing plan with patient= 70 min     d/w Medicine ACP Pamela

## 2023-11-28 NOTE — H&P ADULT - NSHPLABSRESULTS_GEN_ALL_CORE
Labs personally reviewed:                          14.4   9.47  )-----------( 356      ( 28 Nov 2023 09:17 )             43.4       11-28    143  |  105  |  19  ----------------------------<  101<H>  3.8   |  23  |  0.89    Ca    9.7      28 Nov 2023 09:17  Phos  3.5     11-28  Mg     2.1     11-28    TPro  7.9  /  Alb  4.9  /  TBili  0.3  /  DBili  x   /  AST  16  /  ALT  11  /  AlkPhos  86  11-28      PT/INR - ( 28 Nov 2023 09:17 )   PT: 10.7 sec;   INR: 1.02 ratio         PTT - ( 28 Nov 2023 09:17 )  PTT:31.3 sec    Imaging personally reviewed:  Xray RLE  IMPRESSION:  New soft tissue ulceration about the distal third of medial leg without   radiographic findings to suggest advanced osteomyelitis.

## 2023-11-28 NOTE — H&P ADULT - ASSESSMENT
28 y.o male with PMHX of  polysubstance abuse presenting with worsening right lower extremity wound.

## 2023-11-28 NOTE — ED ADULT NURSE NOTE - NSFALLUNIVINTERV_ED_ALL_ED
Bed/Stretcher in lowest position, wheels locked, appropriate side rails in place/Call bell, personal items and telephone in reach/Instruct patient to call for assistance before getting out of bed/chair/stretcher/Non-slip footwear applied when patient is off stretcher/[place] to call system/Physically safe environment - no spills, clutter or unnecessary equipment/Purposeful proactive rounding/Room/bathroom lighting operational, light cord in reach Bed/Stretcher in lowest position, wheels locked, appropriate side rails in place/Call bell, personal items and telephone in reach/Instruct patient to call for assistance before getting out of bed/chair/stretcher/Non-slip footwear applied when patient is off stretcher/[place] to call system/Physically safe environment - no spills, clutter or unnecessary equipment/Purposeful proactive rounding/Room/bathroom lighting operational, light cord in reach Bed/Stretcher in lowest position, wheels locked, appropriate side rails in place/Call bell, personal items and telephone in reach/Instruct patient to call for assistance before getting out of bed/chair/stretcher/Non-slip footwear applied when patient is off stretcher/[place] to call system/Physically safe environment - no spills, clutter or unnecessary equipment/Purposeful proactive rounding/Room/bathroom lighting operational, light cord in reach

## 2023-11-28 NOTE — ED PROVIDER NOTE - CLINICAL SUMMARY MEDICAL DECISION MAKING FREE TEXT BOX
28-year-old male with past medical history of polysubstance abuse, presenting with right lower calf wound that was worsening. Hemodynamically stable.  Physical exam with heart regular rate and rhythm, lungs clear to auscultation, abdomen soft nondistended nontender.  Skin changes with erythema plaque on suprapubic region.  Erythema to the right lower extremity, with necrotic appearing circular wound.

## 2023-11-28 NOTE — ED ADULT NURSE REASSESSMENT NOTE - NS ED NURSE REASSESS COMMENT FT1
Pt returned from [facility].  Primary RN made aware and at bedside for reassessment Pt returned from [facility].  Primary RN made aware and at bedside for reassessment Pt returned from [facility].  Primary RN made aware and at bedside for reassessment

## 2023-11-28 NOTE — ED ADULT NURSE NOTE - SUICIDE SCREENING DEPRESSION
[facility]    Medicine Progress Note - Hospitalist Service       Date of Admission:  3/15/2021  Assessment & Plan       Mariela Allen is a 71 year old female with a past medical history of multiple sclerosis with tetraplegia and spasticity who presented to the emergency department for evaluation of altered mental status in the context of recent diagnosis of E. coli UTI.  She is being admitted for further evaluation.    E. coli UTI: Per care everywhere patient developed UTI symptoms last week.  Urine analysis through urologist grossly abnormal.  She was started on Cipro twice daily.  Culture subsequently grew pansensitive E. coli > 100,000 colonies.  Patient indicated in the emergency department she was confused and only taking the medication once per day.  Afebrile with stable vital signs in the emergency department  -IV ceftriaxone  -Repeat UC on admission negative but given known + culture in CareEverywhere 3/8/21 and unknown compliance with outpatient po antibiotics electing to treat. Transitioned to Keflex 3/18 for 2 additional days    Acute renal failure, resolved Baseline creatinine 0.7-0.8.  Current creatinine elevated at 1.73.  FENA 0.7.  Suspect prerenal in the setting of diminished oral intake due to UTI  - Cr trend 1.73--1.53--1.1--0.8    Acute metabolic encephalopathy, resolved  Concern for Vulnerable Adult: Suspect multifactorial in the setting of UTI, acute renal failure, MS and multiple sedating medications.  She appears nonfocal on exam.  Additionally she indicates in the emergency department she lives with her  who drinks.  Denies issues with safety at home.  It appears during previous hospitalization in 2016 vulnerable adult was filed.  Possible contributing medications include baclofen 30 mg 4 times daily, tramadol 50 mg twice daily, Norco as needed, Valium 5 mg twice daily as needed. I reviewed [place] prescription database and she has no recent refills of  Norco.  She is only had 2 refills of Valium in the past 12 months so low suspicion for benzodiazepine withdrawal  -Stop as needed Valium and Norco   -Reduce baclofen to 15 mg 4 times daily  -Change tramadol to bid prn  -Treat UTI and acute renal failure as above  -Greatly appreciate assistance from Neurology. EEG 3/16 negative. Neurology agrees with reduction of medications. Started on thiamine protocol due to unclear ETOH history. Neurology has signed off  -Physical therapy and occupational therapy consult. Recommend TCU. Patient prefers to discharge home. Vulnerable adult filed by [facility]. D/c home tomorrow with home care and resumption of PCA services  - PO thiamine on discharge    Hypokalemia:    - Replace K per protocol     Multiple sclerosis with tetraplegia  Spacticity  Neurogenic Bladder: Follows at [facility], [facility].  Maintained on a regimen of baclofen, tramadol and prn valium  -Resume baclofen at 50% dose. Prn valium d/c'd. Tramadol available prn  -Monitor PVRs for signs of urinary retention.  Patient does not self cath or require Guadarrama catheter at baseline      Hypertension: SBPs above goal in the ER.  Previously maintained on HCTZ though not on medications at the time of admission.  - Norvasc started 3/16, increase to 10 mg/d 3/17. Will continue on discharge and follow up with PCP    Depression:   -Continue Zoloft    BRITTANY  - Non complaint with CPAP    H/o Breast cancer s/p lumpectomy and radiation (2018)  - Hold Arimidex for now, resume on discharge       Diet: Combination Diet Regular Diet Adult  Room Service  Diet    DVT Prophylaxis: Enoxaparin (Lovenox) SQ  Guadarrama Catheter: not present  Code Status: Full Code           Disposition Plan   Expected discharge: Tomorrow with home care and resumption of PCA services.   Entered: Zoie Arrington PA-C 03/19/2021, 3:07 PM       The patient's care was discussed with the Bedside Nurse, Care Coordinator/ and Patient.    Zoie Gutierrez  EDWIN Arrington  Hospitalist Service  [facility]  Contact information available via [facility] Paging/Directory    ______________________________________________________________________    Interval History   Doing well. Denies issues with worsening pain. Tolerating diet. No chest pain or SOB. Insiting on discharging home with PCA services. This will be delayed until tomorrow as  needs to arrange resumption of PCA services.     Data reviewed today: I reviewed all medications, new labs and imaging results over the last 24 hours. I personally reviewed no images or EKG's today.    Physical Exam   Vital Signs: Temp: 98  F (36.7  C) Temp src: Oral BP: (!) 154/75 Pulse: 76   Resp: 16 SpO2: 97 % O2 Device: None (Room air)    Weight: 145 lbs 0 oz  Physical Exam  Constitutional:       Appearance: Normal appearance.   Eyes:      General: No scleral icterus.  Cardiovascular:      Rate and Rhythm: Normal rate and regular rhythm.      Heart sounds: No murmur.   Pulmonary:      Effort: Pulmonary effort is normal.      Breath sounds: No wheezing.   Musculoskeletal: Normal range of motion.      Right lower leg: No edema.      Left lower leg: No edema.      Comments: Contractures of bilateral hands   Skin:     General: Skin is warm and dry.      Findings: No rash.   Neurological:      General: No focal deficit present.      Mental Status: She is alert and oriented to person, place, and time.   Psychiatric:         Mood and Affect: Mood normal.           Data      Negative

## 2023-11-28 NOTE — PROCEDURE NOTE - ADDITIONAL PROCEDURE DETAILS
2cm venous stasis ulcer with necrotic tissue and fibrinous exudate. Debrided to bleeding tissue around rim and partially in wound bed, stopping due to patient tolerance. Packed with 1/4 Dakins-soaked gauze. Patient admitted to medicine for IV antibiotics. 2cm venous stasis ulcer with necrotic tissue and fibrinous exudate. Debrided to bleeding tissue around rim and partially in wound bed, stopping due to patient tolerance. Packed with 1/4 Dakins-soaked gauze. Patient admitted to medicine for IV antibiotics and likely repeat debridement.

## 2023-11-28 NOTE — H&P ADULT - PROBLEM SELECTOR PLAN 1
noted to have foul smelling wound, with minimal purulent drainage, surrounding chronic skin changes 2/2 venous stasis , w/o leukocytosis, Per patient plan for outpt laser procedure for varicose veins in January   - check wound cx  - f/u blood cx sent in the ED  - Xray w/o OM  - VA duplex( pending )  -  c/w Cefazolin (recent wound cx MSSA 11/9)   - recent: IgG subset, immunoglobulins, and HIV screening --> all negative/WNL  - 11/9 MRI right leg which showed subcutaneous wound, small collection, no osteo  - during last admission ~2 weeks ago seen by Derm: Traumatic ulceration with background chronic lipodermatosclerosis 2/2 stasis dermatitis"  - wound care consult   - ID consult

## 2023-11-29 LAB
ANION GAP SERPL CALC-SCNC: 13 MMOL/L — SIGNIFICANT CHANGE UP (ref 5–17)
BUN SERPL-MCNC: 16 MG/DL — SIGNIFICANT CHANGE UP (ref 7–23)
CALCIUM SERPL-MCNC: 9.2 MG/DL — SIGNIFICANT CHANGE UP (ref 8.4–10.5)
CHLORIDE SERPL-SCNC: 106 MMOL/L — SIGNIFICANT CHANGE UP (ref 96–108)
CO2 SERPL-SCNC: 24 MMOL/L — SIGNIFICANT CHANGE UP (ref 22–31)
CREAT SERPL-MCNC: 0.9 MG/DL — SIGNIFICANT CHANGE UP (ref 0.5–1.3)
EGFR: 120 ML/MIN/1.73M2 — SIGNIFICANT CHANGE UP
GLUCOSE SERPL-MCNC: 91 MG/DL — SIGNIFICANT CHANGE UP (ref 70–99)
GRAM STN FLD: ABNORMAL
HCT VFR BLD CALC: 43.3 % — SIGNIFICANT CHANGE UP (ref 39–50)
HCV AB S/CO SERPL IA: 0.05 S/CO — SIGNIFICANT CHANGE UP (ref 0–0.99)
HCV AB SERPL-IMP: SIGNIFICANT CHANGE UP
HGB BLD-MCNC: 13.9 G/DL — SIGNIFICANT CHANGE UP (ref 13–17)
MCHC RBC-ENTMCNC: 27.8 PG — SIGNIFICANT CHANGE UP (ref 27–34)
MCHC RBC-ENTMCNC: 32.1 GM/DL — SIGNIFICANT CHANGE UP (ref 32–36)
MCV RBC AUTO: 86.6 FL — SIGNIFICANT CHANGE UP (ref 80–100)
NRBC # BLD: 0 /100 WBCS — SIGNIFICANT CHANGE UP (ref 0–0)
PLATELET # BLD AUTO: 348 K/UL — SIGNIFICANT CHANGE UP (ref 150–400)
POTASSIUM SERPL-MCNC: 3.6 MMOL/L — SIGNIFICANT CHANGE UP (ref 3.5–5.3)
POTASSIUM SERPL-SCNC: 3.6 MMOL/L — SIGNIFICANT CHANGE UP (ref 3.5–5.3)
RBC # BLD: 5 M/UL — SIGNIFICANT CHANGE UP (ref 4.2–5.8)
RBC # FLD: 13.2 % — SIGNIFICANT CHANGE UP (ref 10.3–14.5)
SODIUM SERPL-SCNC: 143 MMOL/L — SIGNIFICANT CHANGE UP (ref 135–145)
SPECIMEN SOURCE: SIGNIFICANT CHANGE UP
WBC # BLD: 9.27 K/UL — SIGNIFICANT CHANGE UP (ref 3.8–10.5)
WBC # FLD AUTO: 9.27 K/UL — SIGNIFICANT CHANGE UP (ref 3.8–10.5)

## 2023-11-29 PROCEDURE — 99222 1ST HOSP IP/OBS MODERATE 55: CPT

## 2023-11-29 PROCEDURE — 99232 SBSQ HOSP IP/OBS MODERATE 35: CPT

## 2023-11-29 RX ORDER — INFLUENZA VIRUS VACCINE 15; 15; 15; 15 UG/.5ML; UG/.5ML; UG/.5ML; UG/.5ML
0.5 SUSPENSION INTRAMUSCULAR ONCE
Refills: 0 | Status: DISCONTINUED | OUTPATIENT
Start: 2023-11-29 | End: 2023-12-01

## 2023-11-29 RX ORDER — TRAMADOL HYDROCHLORIDE 50 MG/1
25 TABLET ORAL ONCE
Refills: 0 | Status: DISCONTINUED | OUTPATIENT
Start: 2023-11-29 | End: 2023-11-29

## 2023-11-29 RX ORDER — CHLORHEXIDINE GLUCONATE 213 G/1000ML
1 SOLUTION TOPICAL
Refills: 0 | Status: DISCONTINUED | OUTPATIENT
Start: 2023-11-29 | End: 2023-12-01

## 2023-11-29 RX ORDER — NICOTINE POLACRILEX 2 MG
1 GUM BUCCAL DAILY
Refills: 0 | Status: DISCONTINUED | OUTPATIENT
Start: 2023-11-29 | End: 2023-12-01

## 2023-11-29 RX ADMIN — Medication 650 MILLIGRAM(S): at 11:16

## 2023-11-29 RX ADMIN — TRAMADOL HYDROCHLORIDE 25 MILLIGRAM(S): 50 TABLET ORAL at 12:46

## 2023-11-29 RX ADMIN — Medication 1 APPLICATION(S): at 12:16

## 2023-11-29 RX ADMIN — TRAMADOL HYDROCHLORIDE 25 MILLIGRAM(S): 50 TABLET ORAL at 12:16

## 2023-11-29 RX ADMIN — Medication 1 EACH: at 22:34

## 2023-11-29 RX ADMIN — Medication 100 MILLIGRAM(S): at 22:33

## 2023-11-29 RX ADMIN — Medication 100 MILLIGRAM(S): at 05:03

## 2023-11-29 RX ADMIN — Medication 100 MILLIGRAM(S): at 13:33

## 2023-11-29 RX ADMIN — Medication 650 MILLIGRAM(S): at 11:46

## 2023-11-29 NOTE — CONSULT NOTE ADULT - SUBJECTIVE AND OBJECTIVE BOX
Wound SURGERY CONSULT NOTE    HPI:  28 y.o male with PMHX of  polysubstance abuse presenting with worsening right lower extremity wound.  Patient was discharged home on Keflex, which he recently completed a few days ago.  Patient was evaluated by home care nurse who advised him to return to the emergency room due to worsening wound.  Patient denies fever, chills, nausea and vomiting.  Patient states that wound appears to be worsening.  Denies chest pain, shortness of breath, abdominal pain.    In the ED s/p Cefazolin. and Toradol 15 x1.  (28 Nov 2023 12:52)      Wound consult requested by team to assist w/ management of RLE  wound.   Pt c/o pain, drainage . Offloading and pericare initiated upon admission. No other h/o bites, scratches, falls, trauma.  Appetite good w/o weight loss. All questions asked and answered to pt's and family's  understanding and satisfaction.        Current Diet: Diet, Regular (11-28-23 @ 14:42)      PAST MEDICAL & SURGICAL HISTORY:  Venous insufficiency      Polysubstance abus    REVIEW OF SYSTEMS:  General/ Breast/ Skin/Vasc/ Neuro/ MSK: see HPI  All other systems negative    MEDICATIONS  (STANDING):  ceFAZolin   IVPB 1000 milliGRAM(s) IV Intermittent every 8 hours  chlorhexidine 2% Cloths 1 Application(s) Topical <User Schedule>  Dakins Solution - 1/2 Strength 1 Application(s) Topical daily  traMADol 25 milliGRAM(s) Oral once    MEDICATIONS  (PRN):  acetaminophen     Tablet .. 650 milliGRAM(s) Oral every 6 hours PRN Moderate Pain (4 - 6)      Allergies    Kiwi (Hives; Swelling)  avocado (Hives; Swelling)  No Known Drug Allergies    Intolerances        SOCIAL HISTORY:  single/ methamphetamine, tobacco, marijuana. Denies any IDU    FAMILY HISTORY:   no h/o PVD or wound healing or skin/ significant problems    PHYSICAL EXAM:  Vital Signs Last 24 Hrs  T(C): 36.7 (29 Nov 2023 05:43), Max: 36.8 (28 Nov 2023 19:40)  T(F): 98.1 (29 Nov 2023 05:43), Max: 98.2 (28 Nov 2023 19:40)  HR: 103 (29 Nov 2023 05:43) (71 - 103)  BP: 131/76 (29 Nov 2023 05:43) (124/76 - 149/83)  BP(mean): 95 (28 Nov 2023 16:46) (95 - 95)  RR: 18 (29 Nov 2023 05:43) (16 - 18)  SpO2: 96% (29 Nov 2023 05:43) (96% - 100%)    Parameters below as of 29 Nov 2023 05:43  Patient On (Oxygen Delivery Method): room air        NAD, A&Ox3/ Alert,  WD/ WN  Disheveled     HEENT:  Sclera clear, mucosa moist, throat clear, trachea midline, neck supple  Respiratory: nonlabored w/ equal chest rise  Gastrointestinal: soft NT/ND   Neurology:  strength & sensation grossly intact,  Psych: calm/ appropriate  Musculoskeletal: no deformities/ contractures  Vascular: BLE equally warm,  no cyanosis, clubbing, nor acute ischemia           R  >>LLE edema           BLE DP  pulses palpable           RLE hemosiderin staining           RLE  lateral wound 2cm x 4cm x 2 cm circumferential undermining 4:00 O'clock 2.0cm 9 O'Clock 2.0cm                  moderate serosanguinous drainage, erythema of periwound skin & tenderness                  no blistering, odor, warmth, induration, fluctuance, nor crepitus    Skin:  moist w/ good turgor      LABS/ CULTURES/ RADIOLOGY:                        13.9   9.27  )-----------( 348      ( 29 Nov 2023 07:29 )             43.3       143  |  106  |  16  ----------------------------<  91      [11-29-23 @ 07:29]  3.6   |  24  |  0.90        Ca     9.2     [11-29-23 @ 07:29]      Mg     2.1     [11-28-23 @ 09:17]      Phos  3.5     [11-28-23 @ 09:17]    TPro  7.9  /  Alb  4.9  /  TBili  0.3  /  DBili  x   /  AST  16  /  ALT  11  /  AlkPhos  86  [11-28-23 @ 09:17]    PT/INR: PT 10.7 , INR 1.02       [11-28-23 @ 09:17]  PTT: 31.3       [11-28-23 @ 09:17]                                 Wound SURGERY CONSULT NOTE    HPI:  28 y.o male with PMHX of  polysubstance abuse presenting with worsening right lower extremity wound.  Patient was discharged home on Keflex, which he recently completed a few days ago.  Patient was evaluated by home care nurse who advised him to return to the emergency room due to worsening wound.  Patient denies fever, chills, nausea and vomiting.  Patient states that wound appears to be worsening.  Denies chest pain, shortness of breath, abdominal pain.    In the ED s/p Cefazolin. and Toradol 15 x1.  (28 Nov 2023 12:52)      Wound consult requested by team to assist w/ management of RLE  wound.   Pt c/o pain, drainage . Offloading and pericare initiated upon admission. No other recent h/o bites, scratches, falls, trauma.  Appetite good w/o weight loss. Pt seen by vascular and s/p bedside debridement by vascular on 11-29-23.  Dakins dressing recommended  All questions asked and answered to pt's and friend's  understanding and satisfaction.        Current Diet: Diet, Regular (11-28-23 @ 14:42)      PAST MEDICAL & SURGICAL HISTORY:  Venous insufficiency      Polysubstance abus    REVIEW OF SYSTEMS:  General/ Breast/ Skin/Vasc/ Neuro/ MSK: see HPI  All other systems negative    MEDICATIONS  (STANDING):  ceFAZolin   IVPB 1000 milliGRAM(s) IV Intermittent every 8 hours  chlorhexidine 2% Cloths 1 Application(s) Topical <User Schedule>  Dakins Solution - 1/2 Strength 1 Application(s) Topical daily  traMADol 25 milliGRAM(s) Oral once    MEDICATIONS  (PRN):  acetaminophen     Tablet .. 650 milliGRAM(s) Oral every 6 hours PRN Moderate Pain (4 - 6)      Allergies    Kiwi (Hives; Swelling)  avocado (Hives; Swelling)  No Known Drug Allergies    Intolerances        SOCIAL HISTORY:  single/ methamphetamine, tobacco, marijuana. Denies any IDU    FAMILY HISTORY:   no h/o PVD or wound healing or skin/ significant problems    PHYSICAL EXAM:  Vital Signs Last 24 Hrs  T(C): 36.7 (29 Nov 2023 05:43), Max: 36.8 (28 Nov 2023 19:40)  T(F): 98.1 (29 Nov 2023 05:43), Max: 98.2 (28 Nov 2023 19:40)  HR: 103 (29 Nov 2023 05:43) (71 - 103)  BP: 131/76 (29 Nov 2023 05:43) (124/76 - 149/83)  BP(mean): 95 (28 Nov 2023 16:46) (95 - 95)  RR: 18 (29 Nov 2023 05:43) (16 - 18)  SpO2: 96% (29 Nov 2023 05:43) (96% - 100%)    Parameters below as of 29 Nov 2023 05:43  Patient On (Oxygen Delivery Method): room air        NAD, A&Ox3/ Alert,  WD/ WN  Disheveled     HEENT:  Sclera clear, mucosa moist, throat clear, trachea midline, neck supple  Respiratory: nonlabored w/ equal chest rise  Gastrointestinal: soft NT/ND   Neurology:  strength & sensation grossly intact,  Psych: calm/ appropriate  Musculoskeletal: no deformities/ contractures  Vascular: BLE equally warm,  no cyanosis, clubbing, nor acute ischemia           R  >>LLE edema           BLE DP  pulses palpable           RLE hemosiderin staining           RLE  lateral wound 2cm x 4cm x 2 cm circumferential undermining 4:00 O'clock 2.0cm 9 O'Clock 2.0cm                  small amount of slough to wound bed with granulation ttissue                  moderate serosanguinous drainage, erythema of periwound skin & tenderness                  no blistering, odor, warmth, induration, fluctuance, nor crepitus    Skin:  moist w/ good turgor      LABS/ CULTURES/ RADIOLOGY:                        13.9   9.27  )-----------( 348      ( 29 Nov 2023 07:29 )             43.3       143  |  106  |  16  ----------------------------<  91      [11-29-23 @ 07:29]  3.6   |  24  |  0.90        Ca     9.2     [11-29-23 @ 07:29]      Mg     2.1     [11-28-23 @ 09:17]      Phos  3.5     [11-28-23 @ 09:17]    TPro  7.9  /  Alb  4.9  /  TBili  0.3  /  DBili  x   /  AST  16  /  ALT  11  /  AlkPhos  86  [11-28-23 @ 09:17]    PT/INR: PT 10.7 , INR 1.02       [11-28-23 @ 09:17]  PTT: 31.3       [11-28-23 @ 09:17]

## 2023-11-29 NOTE — CONSULT NOTE ADULT - ASSESSMENT
A/P: 28 y.o male with PMHX of  polysubstance abuse presenting with worsening right lower extremity wound.  Patient was discharged home on Keflex, which he recently completed a few days ago.  Patient was evaluated by home care nurse who advised him to return to the emergency room due to worsening wound.  Patient denies fever, chills, nausea and vomiting.  Patient states that wound appears to be worsening.      Wound Consult requested to assist w/ management of  Right lower extremity wound    Recommendations:   Management of wound per vascular team   Abx per Medicine/ ID  Moisturize intact skin w/ SWEEN cream BID  Nutrition Consult for optimization        encourage high quality protein, mario/ prosource, MVI & Vit C to promote wound healing  Continue turning and positioning w/ offloading assistive devices as per protocol  Buttocks/ Sacrum Continue w/ attends under pads and Pericare as per protocol  Waffle Cushion to chair when oob to chair  Continue w/ low air loss pressure redistribution bed surface   Care as per medicine, will follow w/ you  Upon discharge f/u as outpatient at [facility] Center [address] [place] [address] [phone]  Seen w/ attng &  D/w team & RN  Thank you for this consult  Talita WHITESIDE-BC, [facility] [phone]  Nights/ Weekends/ Holidays please call:  General Surgery Consult pager [phone]) for emergencies  Wound PT for multilayer leg wrapping or VAC issues (x [phone])    A/P: 28 y.o male with PMHX of  polysubstance abuse presenting with worsening right lower extremity wound.  Patient was discharged home on Keflex, which he recently completed a few days ago.  Patient was evaluated by home care nurse who advised him to return to the emergency room due to worsening wound.  Patient denies fever, chills, nausea and vomiting.  Patient states that wound appears to be worsening.      Wound Consult requested to assist w/ management of  Right lower extremity wound    Recommendations:   Management of wound per vascular team   Abx per Medicine/ ID  Moisturize intact skin w/ SWEEN cream BID  Nutrition Consult for optimization        encourage high quality protein, mario/ prosource, MVI & Vit C to promote wound healing  Continue turning and positioning w/ offloading assistive devices as per protocol  Buttocks/ Sacrum Continue w/ attends under pads and Pericare as per protocol  Waffle Cushion to chair when oob to chair  Continue w/ low air loss pressure redistribution bed surface   Care as per medicine, will follow w/ you  Upon discharge f/u as outpatient at [facility] Center [address] [place] [address] [phone]  Seen w/ attng &  D/w team & RN  Thank you for this consult  Talita WHITESIDE-BC, [facility] [phone]  Nights/ Weekends/ Holidays please call:  General Surgery Consult pager [phone]) for emergencies  Wound PT for multilayer leg wrapping or VAC issues (x [phone])    A/P: 28 y.o male with PMHX of  polysubstance abuse presenting with worsening right lower extremity wound.  Patient was discharged home on Keflex, which he recently completed a few days ago.  Patient was evaluated by home care nurse who advised him to return to the emergency room due to worsening wound.  Patient denies fever, chills, nausea and vomiting.  Patient states that wound appears to be worsening.      Wound Consult requested to assist w/ management of  Right lower extremity wound    Recommendations:   Management of wound per vascular team   Abx per Medicine/ ID  Moisturize intact skin w/ SWEEN cream BID  Nutrition Consult for optimization        encourage high quality protein, mario/ prosource, MVI & Vit C to promote wound healing  Continue turning and positioning w/ offloading assistive devices as per protocol  Buttocks/ Sacrum Continue w/ attends under pads and Pericare as per protocol  Waffle Cushion to chair when oob to chair  Continue w/ low air loss pressure redistribution bed surface   Care as per medicine, will follow w/ you  Upon discharge f/u as outpatient at [facility] Center [address] [place] [address] [phone]  Seen w/ attng &  D/w team & RN  Thank you for this consult  Talita WHITESIDE-BC, [facility] [phone]  Nights/ Weekends/ Holidays please call:  General Surgery Consult pager [phone]) for emergencies  Wound PT for multilayer leg wrapping or VAC issues (x [phone])    A/P: 28 y.o male with PMHX of  polysubstance abuse presenting with worsening right lower extremity wound.  Patient was discharged home on Keflex, which he recently completed a few days ago.  Patient was evaluated by home care nurse who advised him to return to the emergency room due to worsening wound.  Patient denies fever, chills, nausea and vomiting.  Patient states that wound appears to be worsening.      Wound Consult requested to assist w/ management of  Right lower extremity wound  PVD    Recommendations:   Agree with 1/4 strength Dakins wet to moist dressing.    Management of wound per vascular team   elevation and compression   Abx per Medicine/ ID  Moisturize intact skin w/ SWEEN cream BID  Nutrition Consult for optimization        encourage high quality protein, mario/ prosource, MVI & Vit C to promote wound healing  Continue turning and positioning w/ offloading assistive devices as per protocol  Buttocks/ Sacrum Continue w/ attends under pads and Pericare as per protocol  Waffle Cushion to chair when oob to chair  Continue w/ low air loss pressure redistribution bed surface   Care as per medicine, will follow w/ you  Upon discharge f/u as outpatient at W[facility] Center [address] [place] [address] [phone]  Seen w/ attng &  D/w team & RN  Thank you for this consult  Talita Hutchison [facility], [facility] [phone]  Nights/ Weekends/ Holidays please call:  General Surgery Consult pager [phone]) for emergencies  Wound PT for multilayer leg wrapping or VAC issues (x [phone])    A/P: 28 y.o male with PMHX of  polysubstance abuse presenting with worsening right lower extremity wound.  Patient was discharged home on Keflex, which he recently completed a few days ago.  Patient was evaluated by home care nurse who advised him to return to the emergency room due to worsening wound.  Patient denies fever, chills, nausea and vomiting.  Patient states that wound appears to be worsening.      Wound Consult requested to assist w/ management of  Right lower extremity wound  PVD    Recommendations:   Agree with 1/4 strength Dakins wet to moist dressing.    Management of wound per vascular team   elevation and compression   Abx per Medicine/ ID  Moisturize intact skin w/ SWEEN cream BID  Nutrition Consult for optimization        encourage high quality protein, mario/ prosource, MVI & Vit C to promote wound healing  Continue turning and positioning w/ offloading assistive devices as per protocol  Buttocks/ Sacrum Continue w/ attends under pads and Pericare as per protocol  Waffle Cushion to chair when oob to chair  Continue w/ low air loss pressure redistribution bed surface   Care as per medicine, will follow w/ you  Upon discharge f/u as outpatient at Wound Center [address] [place] [address] [phone]  Seen w/ attng &  D/w team & RN  Thank you for this consult  Talita Hutchison [facility], [facility] [phone]  Nights/ Weekends/ Holidays please call:  General Surgery Consult pager [phone]) for emergencies  Wound PT for multilayer leg wrapping or VAC issues (x [phone])    A/P: 28 y.o male with PMHX of  polysubstance abuse presenting with worsening right lower extremity wound.  Patient was discharged home on Keflex, which he recently completed a few days ago.  Patient was evaluated by home care nurse who advised him to return to the emergency room due to worsening wound.  Patient denies fever, chills, nausea and vomiting.  Patient states that wound appears to be worsening.      Wound Consult requested to assist w/ management of  Right lower extremity wound  PVD    Recommendations:   Agree with 1/4 strength Dakins wet to moist dressing.    Management of wound per vascular team   elevation and compression   Abx per Medicine/ ID  Moisturize intact skin w/ SWEEN cream BID  Nutrition Consult for optimization        encourage high quality protein, mario/ prosource, MVI & Vit C to promote wound healing  Continue turning and positioning w/ offloading assistive devices as per protocol  Buttocks/ Sacrum Continue w/ attends under pads and Pericare as per protocol  Waffle Cushion to chair when oob to chair  Continue w/ low air loss pressure redistribution bed surface   Care as per medicine, will follow w/ you  Upon discharge f/u as outpatient at Wound Center [address] [place] [address] [phone]  Seen w/ attng &  D/w team & RN  Thank you for this consult  Talita Hutchison [facility], [facility] [phone]  Nights/ Weekends/ Holidays please call:  General Surgery Consult pager [phone]) for emergencies  Wound PT for multilayer leg wrapping or VAC issues (x [phone])

## 2023-11-29 NOTE — PATIENT PROFILE ADULT - FUNCTIONAL ASSESSMENT - BASIC MOBILITY 6.
3-calculated by average/Not able to assess (calculate score using [facility] averaging method)  3-calculated by average/Not able to assess (calculate score using [facility] averaging method)  3-calculated by average/Not able to assess (calculate score using [facility] averaging method)

## 2023-11-29 NOTE — PATIENT PROFILE ADULT - FALL HARM RISK - HARM RISK INTERVENTIONS
Communicate Risk of Fall with Harm to all staff/Reinforce activity limits and safety measures with patient and family/Tailored Fall Risk Interventions/Visual Cue: Yellow wristband and red socks/Bed in lowest position, wheels locked, appropriate side rails in place/Call bell, personal items and telephone in reach/Instruct patient to call for assistance before getting out of bed or chair/Non-slip footwear when patient is out of bed/[place] to call system/Physically safe environment - no spills, clutter or unnecessary equipment/Purposeful Proactive Rounding/Room/bathroom lighting operational, light cord in reach Communicate Risk of Fall with Harm to all staff/Reinforce activity limits and safety measures with patient and family/Tailored Fall Risk Interventions/Visual Cue: Yellow wristband and red socks/Bed in lowest position, wheels locked, appropriate side rails in place/Call bell, personal items and telephone in reach/Instruct patient to call for assistance before getting out of bed or chair/Non-slip footwear when patient is out of bed/[place] to call system/Physically safe environment - no spills, clutter or unnecessary equipment/Purposeful Proactive Rounding/Room/bathroom lighting operational, light cord in reach Communicate Risk of Fall with Harm to all staff/Reinforce activity limits and safety measures with patient and family/Tailored Fall Risk Interventions/Visual Cue: Yellow wristband and red socks/Bed in lowest position, wheels locked, appropriate side rails in place/Call bell, personal items and telephone in reach/Instruct patient to call for assistance before getting out of bed or chair/Non-slip footwear when patient is out of bed/[place] to call system/Physically safe environment - no spills, clutter or unnecessary equipment/Purposeful Proactive Rounding/Room/bathroom lighting operational, light cord in reach

## 2023-11-29 NOTE — CONSULT NOTE ADULT - NS ATTEND AMEND GEN_ALL_CORE FT
Pt seen and examined with ACP.  Assessment and plan reviewed and discussed.  Agree with above.    Status of wounds and treatment recommendations d/w  pt and friend at bedside.   All questions answered.   Pt and friend expressed understanding.     I spent 55  minutes face to face w/ this pt of which more than 50% of the time was spent counseling & coordinating care of this pt.

## 2023-11-29 NOTE — PROGRESS NOTE ADULT - SUBJECTIVE AND OBJECTIVE BOX
PROGRESS NOTE:   Melyssa Davis, DO  Hospitalist  Teams  After 5pm/weekends or if no answer ext: [phone]      Patient is a 28y old  Male who presents with a chief complaint of Worsening Lower extremity wound (29 Nov 2023 12:06)      SUBJECTIVE / OVERNIGHT EVENTS: MUNDO    ADDITIONAL REVIEW OF SYSTEMS:  no fever or chills no n/v/d    MEDICATIONS  (STANDING):  ceFAZolin   IVPB 1000 milliGRAM(s) IV Intermittent every 8 hours  chlorhexidine 2% Cloths 1 Application(s) Topical <User Schedule>  Dakins Solution - 1/2 Strength 1 Application(s) Topical daily    MEDICATIONS  (PRN):  acetaminophen     Tablet .. 650 milliGRAM(s) Oral every 6 hours PRN Moderate Pain (4 - 6)      CAPILLARY BLOOD GLUCOSE        I&O's Summary      PHYSICAL EXAM:  Vital Signs Last 24 Hrs  T(C): 36.4 (29 Nov 2023 12:20), Max: 36.8 (28 Nov 2023 19:40)  T(F): 97.5 (29 Nov 2023 12:20), Max: 98.2 (28 Nov 2023 19:40)  HR: 65 (29 Nov 2023 12:20) (65 - 103)  BP: 112/69 (29 Nov 2023 12:20) (112/69 - 149/83)  BP(mean): 95 (28 Nov 2023 16:46) (95 - 95)  RR: 18 (29 Nov 2023 12:20) (16 - 18)  SpO2: 98% (29 Nov 2023 12:20) (96% - 100%)    Parameters below as of 29 Nov 2023 12:20  Patient On (Oxygen Delivery Method): room air        CONSTITUTIONAL: NAD, well-developed   MUSCLOSKELETAL:    PSYCH: A+O to person, place, and time; affect appropriate    LABS:                        13.9   9.27  )-----------( 348      ( 29 Nov 2023 07:29 )             43.3     11-29    143  |  106  |  16  ----------------------------<  91  3.6   |  24  |  0.90    Ca    9.2      29 Nov 2023 07:29  Phos  3.5     11-28  Mg     2.1     11-28    TPro  7.9  /  Alb  4.9  /  TBili  0.3  /  DBili  x   /  AST  16  /  ALT  11  /  AlkPhos  86  11-28    PT/INR - ( 28 Nov 2023 09:17 )   PT: 10.7 sec;   INR: 1.02 ratio         PTT - ( 28 Nov 2023 09:17 )  PTT:31.3 sec      Urinalysis Basic - ( 29 Nov 2023 07:29 )    Color: x / Appearance: x / SG: x / pH: x  Gluc: 91 mg/dL / Ketone: x  / Bili: x / Urobili: x   Blood: x / Protein: x / Nitrite: x   Leuk Esterase: x / RBC: x / WBC x   Sq Epi: x / Non Sq Epi: x / Bacteria: x        Culture - Tissue with Gram Stain (collected 28 Nov 2023 18:16)  Source: .Tissue Other  Gram Stain (29 Nov 2023 02:59):    Rare polymorphonuclear leukocytes seen per low power field    Numerous Gram positive cocci in pairs seen per oil power field    Numerous Gram Negative Rods seen per oil power field        RADIOLOGY & ADDITIONAL TESTS:  Results Reviewed:   Imaging Personally Reviewed:  Electrocardiogram Personally Reviewed:    COORDINATION OF CARE:  Care Discussed with Consultants/Other Providers [Y/N]:  Prior or Outpatient Records Reviewed [Y/N]:   PROGRESS NOTE:   Melyssa Davis, DO  Hospitalist  Teams  After 5pm/weekends or if no answer ext: [phone]      Patient is a 28y old  Male who presents with a chief complaint of Worsening Lower extremity wound (29 Nov 2023 12:06)      SUBJECTIVE / OVERNIGHT EVENTS: MUNDO    ADDITIONAL REVIEW OF SYSTEMS:  no fever or chills no n/v/d    MEDICATIONS  (STANDING):  ceFAZolin   IVPB 1000 milliGRAM(s) IV Intermittent every 8 hours  chlorhexidine 2% Cloths 1 Application(s) Topical <User Schedule>  Dakins Solution - 1/2 Strength 1 Application(s) Topical daily    MEDICATIONS  (PRN):  acetaminophen     Tablet .. 650 milliGRAM(s) Oral every 6 hours PRN Moderate Pain (4 - 6)      CAPILLARY BLOOD GLUCOSE        I&O's Summary      PHYSICAL EXAM:  Vital Signs Last 24 Hrs  T(C): 36.4 (29 Nov 2023 12:20), Max: 36.8 (28 Nov 2023 19:40)  T(F): 97.5 (29 Nov 2023 12:20), Max: 98.2 (28 Nov 2023 19:40)  HR: 65 (29 Nov 2023 12:20) (65 - 103)  BP: 112/69 (29 Nov 2023 12:20) (112/69 - 149/83)  BP(mean): 95 (28 Nov 2023 16:46) (95 - 95)  RR: 18 (29 Nov 2023 12:20) (16 - 18)  SpO2: 98% (29 Nov 2023 12:20) (96% - 100%)    Parameters below as of 29 Nov 2023 12:20  Patient On (Oxygen Delivery Method): room air        CONSTITUTIONAL: NAD, well-developed   MUSCLOSKELETAL:    PSYCH: A+O to person, place, and time; affect appropriate    LABS:                        13.9   9.27  )-----------( 348      ( 29 Nov 2023 07:29 )             43.3     11-29    143  |  106  |  16  ----------------------------<  91  3.6   |  24  |  0.90    Ca    9.2      29 Nov 2023 07:29  Phos  3.5     11-28  Mg     2.1     11-28    TPro  7.9  /  Alb  4.9  /  TBili  0.3  /  DBili  x   /  AST  16  /  ALT  11  /  AlkPhos  86  11-28    PT/INR - ( 28 Nov 2023 09:17 )   PT: 10.7 sec;   INR: 1.02 ratio         PTT - ( 28 Nov 2023 09:17 )  PTT:31.3 sec      Urinalysis Basic - ( 29 Nov 2023 07:29 )    Color: x / Appearance: x / SG: x / pH: x  Gluc: 91 mg/dL / Ketone: x  / Bili: x / Urobili: x   Blood: x / Protein: x / Nitrite: x   Leuk Esterase: x / RBC: x / WBC x   Sq Epi: x / Non Sq Epi: x / Bacteria: x        Culture - Tissue with Gram Stain (collected 28 Nov 2023 18:16)  Source: .Tissue Other  Gram Stain (29 Nov 2023 02:59):    Rare polymorphonuclear leukocytes seen per low power field    Numerous Gram positive cocci in pairs seen per oil power field    Numerous Gram Negative Rods seen per oil power field        RADIOLOGY & ADDITIONAL TESTS:  Results Reviewed:   Imaging Personally Reviewed:  Electrocardiogram Personally Reviewed:    COORDINATION OF CARE:  Care Discussed with Consultants/Other Providers [Y/N]:  Prior or Outpatient Records Reviewed [Y/N]:   PROGRESS NOTE:   Melyssa Davis, DO  Hospitalist  Teams  After 5pm/weekends or if no answer ext: [phone]      Patient is a 28y old  Male who presents with a chief complaint of Worsening Lower extremity wound (29 Nov 2023 12:06)      SUBJECTIVE / OVERNIGHT EVENTS: MUNDO    ADDITIONAL REVIEW OF SYSTEMS:  no fever or chills no n/v/d    MEDICATIONS  (STANDING):  ceFAZolin   IVPB 1000 milliGRAM(s) IV Intermittent every 8 hours  chlorhexidine 2% Cloths 1 Application(s) Topical <User Schedule>  Dakins Solution - 1/2 Strength 1 Application(s) Topical daily    MEDICATIONS  (PRN):  acetaminophen     Tablet .. 650 milliGRAM(s) Oral every 6 hours PRN Moderate Pain (4 - 6)      CAPILLARY BLOOD GLUCOSE        I&O's Summary      PHYSICAL EXAM:  Vital Signs Last 24 Hrs  T(C): 36.4 (29 Nov 2023 12:20), Max: 36.8 (28 Nov 2023 19:40)  T(F): 97.5 (29 Nov 2023 12:20), Max: 98.2 (28 Nov 2023 19:40)  HR: 65 (29 Nov 2023 12:20) (65 - 103)  BP: 112/69 (29 Nov 2023 12:20) (112/69 - 149/83)  BP(mean): 95 (28 Nov 2023 16:46) (95 - 95)  RR: 18 (29 Nov 2023 12:20) (16 - 18)  SpO2: 98% (29 Nov 2023 12:20) (96% - 100%)    Parameters below as of 29 Nov 2023 12:20  Patient On (Oxygen Delivery Method): room air        CONSTITUTIONAL: NAD, well-developed   MUSCLOSKELETAL:    PSYCH: A+O to person, place, and time; affect appropriate    LABS:                        13.9   9.27  )-----------( 348      ( 29 Nov 2023 07:29 )             43.3     11-29    143  |  106  |  16  ----------------------------<  91  3.6   |  24  |  0.90    Ca    9.2      29 Nov 2023 07:29  Phos  3.5     11-28  Mg     2.1     11-28    TPro  7.9  /  Alb  4.9  /  TBili  0.3  /  DBili  x   /  AST  16  /  ALT  11  /  AlkPhos  86  11-28    PT/INR - ( 28 Nov 2023 09:17 )   PT: 10.7 sec;   INR: 1.02 ratio         PTT - ( 28 Nov 2023 09:17 )  PTT:31.3 sec      Urinalysis Basic - ( 29 Nov 2023 07:29 )    Color: x / Appearance: x / SG: x / pH: x  Gluc: 91 mg/dL / Ketone: x  / Bili: x / Urobili: x   Blood: x / Protein: x / Nitrite: x   Leuk Esterase: x / RBC: x / WBC x   Sq Epi: x / Non Sq Epi: x / Bacteria: x        Culture - Tissue with Gram Stain (collected 28 Nov 2023 18:16)  Source: .Tissue Other  Gram Stain (29 Nov 2023 02:59):    Rare polymorphonuclear leukocytes seen per low power field    Numerous Gram positive cocci in pairs seen per oil power field    Numerous Gram Negative Rods seen per oil power field        RADIOLOGY & ADDITIONAL TESTS:  Results Reviewed:   Imaging Personally Reviewed:  Electrocardiogram Personally Reviewed:    COORDINATION OF CARE:  Care Discussed with Consultants/Other Providers [Y/N]:  Prior or Outpatient Records Reviewed [Y/N]:

## 2023-11-30 DIAGNOSIS — R52 PAIN, UNSPECIFIED: ICD-10-CM

## 2023-11-30 LAB
-  AMIKACIN: SIGNIFICANT CHANGE UP
-  AMOXICILLIN/CLAVULANIC ACID: SIGNIFICANT CHANGE UP
-  AMPICILLIN/SULBACTAM: SIGNIFICANT CHANGE UP
-  AMPICILLIN: SIGNIFICANT CHANGE UP
-  AZTREONAM: SIGNIFICANT CHANGE UP
-  CEFAZOLIN: SIGNIFICANT CHANGE UP
-  CEFEPIME: SIGNIFICANT CHANGE UP
-  CEFOXITIN: SIGNIFICANT CHANGE UP
-  CEFTRIAXONE: SIGNIFICANT CHANGE UP
-  CIPROFLOXACIN: SIGNIFICANT CHANGE UP
-  CLINDAMYCIN: SIGNIFICANT CHANGE UP
-  ERTAPENEM: SIGNIFICANT CHANGE UP
-  ERYTHROMYCIN: SIGNIFICANT CHANGE UP
-  GENTAMICIN: SIGNIFICANT CHANGE UP
-  IMIPENEM: SIGNIFICANT CHANGE UP
-  LEVOFLOXACIN: SIGNIFICANT CHANGE UP
-  MEROPENEM: SIGNIFICANT CHANGE UP
-  OXACILLIN: SIGNIFICANT CHANGE UP
-  PENICILLIN: SIGNIFICANT CHANGE UP
-  PIPERACILLIN/TAZOBACTAM: SIGNIFICANT CHANGE UP
-  RIFAMPIN: SIGNIFICANT CHANGE UP
-  TETRACYCLINE: SIGNIFICANT CHANGE UP
-  TOBRAMYCIN: SIGNIFICANT CHANGE UP
-  TRIMETHOPRIM/SULFAMETHOXAZOLE: SIGNIFICANT CHANGE UP
-  VANCOMYCIN: SIGNIFICANT CHANGE UP
CULTURE RESULTS: ABNORMAL
METHOD TYPE: SIGNIFICANT CHANGE UP
ORGANISM # SPEC MICROSCOPIC CNT: ABNORMAL
SPECIMEN SOURCE: SIGNIFICANT CHANGE UP

## 2023-11-30 PROCEDURE — 99232 SBSQ HOSP IP/OBS MODERATE 35: CPT

## 2023-11-30 RX ORDER — PIPERACILLIN AND TAZOBACTAM 4; .5 G/20ML; G/20ML
3.38 INJECTION, POWDER, LYOPHILIZED, FOR SOLUTION INTRAVENOUS EVERY 8 HOURS
Refills: 0 | Status: DISCONTINUED | OUTPATIENT
Start: 2023-11-30 | End: 2023-12-01

## 2023-11-30 RX ORDER — PIPERACILLIN AND TAZOBACTAM 4; .5 G/20ML; G/20ML
3.38 INJECTION, POWDER, LYOPHILIZED, FOR SOLUTION INTRAVENOUS ONCE
Refills: 0 | Status: COMPLETED | OUTPATIENT
Start: 2023-11-30 | End: 2023-11-30

## 2023-11-30 RX ORDER — IBUPROFEN 200 MG
400 TABLET ORAL
Refills: 0 | Status: DISCONTINUED | OUTPATIENT
Start: 2023-12-01 | End: 2023-12-01

## 2023-11-30 RX ORDER — KETOROLAC TROMETHAMINE 30 MG/ML
15 SYRINGE (ML) INJECTION ONCE
Refills: 0 | Status: DISCONTINUED | OUTPATIENT
Start: 2023-11-30 | End: 2023-11-30

## 2023-11-30 RX ADMIN — PIPERACILLIN AND TAZOBACTAM 25 GRAM(S): 4; .5 INJECTION, POWDER, LYOPHILIZED, FOR SOLUTION INTRAVENOUS at 21:49

## 2023-11-30 RX ADMIN — CHLORHEXIDINE GLUCONATE 1 APPLICATION(S): 213 SOLUTION TOPICAL at 05:47

## 2023-11-30 RX ADMIN — Medication 15 MILLIGRAM(S): at 11:20

## 2023-11-30 RX ADMIN — PIPERACILLIN AND TAZOBACTAM 200 GRAM(S): 4; .5 INJECTION, POWDER, LYOPHILIZED, FOR SOLUTION INTRAVENOUS at 09:39

## 2023-11-30 RX ADMIN — Medication 100 MILLIGRAM(S): at 05:45

## 2023-11-30 RX ADMIN — Medication 1 APPLICATION(S): at 11:21

## 2023-11-30 RX ADMIN — PIPERACILLIN AND TAZOBACTAM 25 GRAM(S): 4; .5 INJECTION, POWDER, LYOPHILIZED, FOR SOLUTION INTRAVENOUS at 13:06

## 2023-11-30 RX ADMIN — Medication 15 MILLIGRAM(S): at 11:45
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
## 2023-11-30 NOTE — PROGRESS NOTE ADULT - ASSESSMENT
28M with PMHX of  polysubstance abuse presenting with worsening right lower extremity wound.  Pt follows with Dr. Kimball outpt for venous insufficiency. s/p 11/29 bedside debridement.    PLAN  - Daily dressing changes by nursing  - Appreciate wound care - please have pt follow up outpatient  - No vascular surgery intervention - outpatient follow up with Dr. Salomon  - Care per primary  - Encouraged ambulation  - Reconsult PRN    Vascular Surgery  [id number]   28M with PMHX of  polysubstance abuse presenting with worsening right lower extremity wound.  Pt follows with Dr. Kimball outpt for venous insufficiency. s/p 11/29 bedside debridement.    PLAN  - Daily dressing changes by nursing  - Appreciate wound care - please have pt follow up outpatient  - No vascular surgery intervention - outpatient follow up with Dr. Salomon  - Care per primary  - Encouraged ambulation  - Reconsult PRN    Vascular Surgery  [id number]   28M with PMHX of  polysubstance abuse presenting with worsening right lower extremity wound.  Pt follows with Dr. Kimball outpt for venous insufficiency. s/p 11/29 bedside debridement.    PLAN  - Daily dressing changes by nursing  - Appreciate wound care - please have pt follow up outpatient  - No vascular surgery intervention - outpatient follow up with Dr. Salomon  - Care per primary  - Encouraged ambulation  - Reconsult PRN    Vascular Surgery  [id number]

## 2023-11-30 NOTE — PROGRESS NOTE ADULT - SUBJECTIVE AND OBJECTIVE BOX
Follow Up:  leg wound    Interval History/ROS:   denies pain    Allergies  Kiwi (Hives; Swelling)  avocado (Hives; Swelling)  No Known Drug Allergies    ANTIMICROBIALS:  piperacillin/tazobactam IVPB.. 3.375 every 8 hours      OTHER MEDS:  MEDICATIONS  (STANDING):  acetaminophen     Tablet .. 650 every 6 hours PRN  influenza   Vaccine 0.5 once      Vital Signs Last 24 Hrs  T(C): 36.4 (30 Nov 2023 12:24), Max: 36.9 (29 Nov 2023 19:57)  T(F): 97.5 (30 Nov 2023 12:24), Max: 98.4 (29 Nov 2023 19:57)  HR: 89 (30 Nov 2023 12:24) (71 - 89)  BP: 119/72 (30 Nov 2023 12:24) (119/72 - 169/78)  BP(mean): --  RR: 18 (30 Nov 2023 12:24) (18 - 18)  SpO2: 98% (30 Nov 2023 12:24) (98% - 100%)    Parameters below as of 30 Nov 2023 12:24  Patient On (Oxygen Delivery Method): room air        PHYSICAL EXAM:  General: WN/WD NAD, Non-toxic  Neurology: A&Ox3, nonfocal  Respiratory: Clear to auscultation bilaterally  CV: RRR, S1S2, no murmurs, rubs or gallops  Abdominal: Soft, Non-tender, non-distended, normal bowel sounds  Extremities: rle  edema,  ace wrap dressing clean and dry  Line Sites: Clear  Skin: No rash                          13.9   9.27  )-----------( 348      ( 29 Nov 2023 07:29 )             43.3   WBC Count: 9.27 (11-29 @ 07:29)  WBC Count: 9.47 (11-28 @ 09:17)      11-29    143  |  106  |  16  ----------------------------<  91  3.6   |  24  |  0.90    Ca    9.2      29 Nov 2023 07:29        MICROBIOLOGY:  .Tissue Other  11-28-23   Numerous Most closely resembling Pseudomonas aeruginosa  SUSC to all tested  Numerous Proteus mirabilis SUSC to all tested  Moderate Escherichia coli  Resist to Bactrim only  Moderate Enterococcus faecalis SUSC to AMP  Moderate Staphylococcus aureus  = MSSA      .Blood Blood-Peripheral  11-28-23   No growth at 48 Hours  --  --      .Blood Blood-Peripheral  11-28-23   No growth at 48 Hours  --  --      .Abscess Leg - Right  11-07-23   Moderate Staphylococcus aureus  Numerous Proteus mirabilis  Numerous Prevotella timonensis "Susceptibilities not performed"  Numerous Porphyromonas somerae "Susceptibilities not performed"  --  Staphylococcus aureus  Proteus mirabilis      .Blood Blood-Peripheral  11-07-23   No growth at 5 days  --  --      .Blood Blood-Peripheral  11-07-23   No growth at 5 days  --  --          .Blood Blood-Peripheral  .Blood Blood-Peripheral    RADIOLOGY:  < from: Xray Tibia + Fibula 2 Views, Right (11.28.23 @ 10:10) >  IMPRESSION:  New soft tissue ulceration about the distal third of medial leg without   radiographic findings to suggest advanced osteomyelitis.    < end of copied text >      Bryan Valiente MD; Division of Infectious Disease; Pager: [phone]; nights and weekends: [phone]   Follow Up:  leg wound    Interval History/ROS:   denies pain    Allergies  Kiwi (Hives; Swelling)  avocado (Hives; Swelling)  No Known Drug Allergies    ANTIMICROBIALS:  piperacillin/tazobactam IVPB.. 3.375 every 8 hours      OTHER MEDS:  MEDICATIONS  (STANDING):  acetaminophen     Tablet .. 650 every 6 hours PRN  influenza   Vaccine 0.5 once      Vital Signs Last 24 Hrs  T(C): 36.4 (30 Nov 2023 12:24), Max: 36.9 (29 Nov 2023 19:57)  T(F): 97.5 (30 Nov 2023 12:24), Max: 98.4 (29 Nov 2023 19:57)  HR: 89 (30 Nov 2023 12:24) (71 - 89)  BP: 119/72 (30 Nov 2023 12:24) (119/72 - 169/78)  BP(mean): --  RR: 18 (30 Nov 2023 12:24) (18 - 18)  SpO2: 98% (30 Nov 2023 12:24) (98% - 100%)    Parameters below as of 30 Nov 2023 12:24  Patient On (Oxygen Delivery Method): room air        PHYSICAL EXAM:  General: WN/WD NAD, Non-toxic  Neurology: A&Ox3, nonfocal  Respiratory: Clear to auscultation bilaterally  CV: RRR, S1S2, no murmurs, rubs or gallops  Abdominal: Soft, Non-tender, non-distended, normal bowel sounds  Extremities: rle  edema,  ace wrap dressing clean and dry  Line Sites: Clear  Skin: No rash                          13.9   9.27  )-----------( 348      ( 29 Nov 2023 07:29 )             43.3   WBC Count: 9.27 (11-29 @ 07:29)  WBC Count: 9.47 (11-28 @ 09:17)      11-29    143  |  106  |  16  ----------------------------<  91  3.6   |  24  |  0.90    Ca    9.2      29 Nov 2023 07:29        MICROBIOLOGY:  .Tissue Other  11-28-23   Numerous Most closely resembling Pseudomonas aeruginosa  SUSC to all tested  Numerous Proteus mirabilis SUSC to all tested  Moderate Escherichia coli  Resist to Bactrim only  Moderate Enterococcus faecalis SUSC to AMP  Moderate Staphylococcus aureus  = MSSA      .Blood Blood-Peripheral  11-28-23   No growth at 48 Hours  --  --      .Blood Blood-Peripheral  11-28-23   No growth at 48 Hours  --  --      .Abscess Leg - Right  11-07-23   Moderate Staphylococcus aureus  Numerous Proteus mirabilis  Numerous Prevotella timonensis "Susceptibilities not performed"  Numerous Porphyromonas somerae "Susceptibilities not performed"  --  Staphylococcus aureus  Proteus mirabilis      .Blood Blood-Peripheral  11-07-23   No growth at 5 days  --  --      .Blood Blood-Peripheral  11-07-23   No growth at 5 days  --  --          .Blood Blood-Peripheral  .Blood Blood-Peripheral    RADIOLOGY:  < from: Xray Tibia + Fibula 2 Views, Right (11.28.23 @ 10:10) >  IMPRESSION:  New soft tissue ulceration about the distal third of medial leg without   radiographic findings to suggest advanced osteomyelitis.    < end of copied text >      Bryan Valiente MD; Division of Infectious Disease; Pager: [phone]; nights and weekends: [phone]   Follow Up:  leg wound    Interval History/ROS:   denies pain    Allergies  Kiwi (Hives; Swelling)  avocado (Hives; Swelling)  No Known Drug Allergies    ANTIMICROBIALS:  piperacillin/tazobactam IVPB.. 3.375 every 8 hours      OTHER MEDS:  MEDICATIONS  (STANDING):  acetaminophen     Tablet .. 650 every 6 hours PRN  influenza   Vaccine 0.5 once      Vital Signs Last 24 Hrs  T(C): 36.4 (30 Nov 2023 12:24), Max: 36.9 (29 Nov 2023 19:57)  T(F): 97.5 (30 Nov 2023 12:24), Max: 98.4 (29 Nov 2023 19:57)  HR: 89 (30 Nov 2023 12:24) (71 - 89)  BP: 119/72 (30 Nov 2023 12:24) (119/72 - 169/78)  BP(mean): --  RR: 18 (30 Nov 2023 12:24) (18 - 18)  SpO2: 98% (30 Nov 2023 12:24) (98% - 100%)    Parameters below as of 30 Nov 2023 12:24  Patient On (Oxygen Delivery Method): room air        PHYSICAL EXAM:  General: WN/WD NAD, Non-toxic  Neurology: A&Ox3, nonfocal  Respiratory: Clear to auscultation bilaterally  CV: RRR, S1S2, no murmurs, rubs or gallops  Abdominal: Soft, Non-tender, non-distended, normal bowel sounds  Extremities: rle  edema,  ace wrap dressing clean and dry  Line Sites: Clear  Skin: No rash                          13.9   9.27  )-----------( 348      ( 29 Nov 2023 07:29 )             43.3   WBC Count: 9.27 (11-29 @ 07:29)  WBC Count: 9.47 (11-28 @ 09:17)      11-29    143  |  106  |  16  ----------------------------<  91  3.6   |  24  |  0.90    Ca    9.2      29 Nov 2023 07:29        MICROBIOLOGY:  .Tissue Other  11-28-23   Numerous Most closely resembling Pseudomonas aeruginosa  SUSC to all tested  Numerous Proteus mirabilis SUSC to all tested  Moderate Escherichia coli  Resist to Bactrim only  Moderate Enterococcus faecalis SUSC to AMP  Moderate Staphylococcus aureus  = MSSA      .Blood Blood-Peripheral  11-28-23   No growth at 48 Hours  --  --      .Blood Blood-Peripheral  11-28-23   No growth at 48 Hours  --  --      .Abscess Leg - Right  11-07-23   Moderate Staphylococcus aureus  Numerous Proteus mirabilis  Numerous Prevotella timonensis "Susceptibilities not performed"  Numerous Porphyromonas somerae "Susceptibilities not performed"  --  Staphylococcus aureus  Proteus mirabilis      .Blood Blood-Peripheral  11-07-23   No growth at 5 days  --  --      .Blood Blood-Peripheral  11-07-23   No growth at 5 days  --  --          .Blood Blood-Peripheral  .Blood Blood-Peripheral    RADIOLOGY:  < from: Xray Tibia + Fibula 2 Views, Right (11.28.23 @ 10:10) >  IMPRESSION:  New soft tissue ulceration about the distal third of medial leg without   radiographic findings to suggest advanced osteomyelitis.    < end of copied text >      Bryan Valiente MD; Division of Infectious Disease; Pager: [phone]; nights and weekends: [phone]

## 2023-11-30 NOTE — PROGRESS NOTE ADULT - ASSESSMENT
28 y.o man with RLE venous insufficiency, history of trauma to RLE, previous polysubstance abuse admitted 11/28/23 with worsening right lower extremity wound concerning for venous stasis dermatitis ulcer.  Local necrosis with malodour noted on exam.   No gross signs of infection of cellulitis  11/7/23  abscess culture with Staph aureus and Proteus mirabilis   11/9/23 HIV negative    - Recent Dermatology and Surgery input noted. C/f chronic lipodermatosclerosis 2/2 stasis dermatitis  - X-ray without evidence of osteo and previous MRI with sinus  - Previous MRI showing Cutaneous wound along the medial aspect of the calf at the level of the mid tibial diaphysis which communicates with a partially fluid-filled irregularly shaped defect in the medial subcutaneous tissues. No osteomyelitis    11/28 hepatitis C NEG  11/28 wound culture:  Pseudomonas aeruginosa, Proteus mirabilis, Escherichia coli, Enterococcus faecalis, Staphylococcus aureus    s/p  Ancef 11/28 -->11/30  Zosyn 11/30 -->    Suggest  Continue Zosyn  (I ordered this am)  wound care  can change to po Cipro/Augmentin when ready for discharge  Continue antibiotics through 12/9 as long as wound improves

## 2023-11-30 NOTE — PROGRESS NOTE ADULT - SUBJECTIVE AND OBJECTIVE BOX
Italo Patel   contact via pager or TEAMS        CC: Patient is a 28y old  Male who presents with a chief complaint of Worsening Lower extremity wound (30 Nov 2023 08:55)      SUBJECTIVE / OVERNIGHT EVENTS:    MEDICATIONS  (STANDING):  ceFAZolin   IVPB 1000 milliGRAM(s) IV Intermittent every 8 hours  chlorhexidine 2% Cloths 1 Application(s) Topical <User Schedule>  Dakins Solution - 1/2 Strength 1 Application(s) Topical daily  influenza   Vaccine 0.5 milliLiter(s) IntraMuscular once  nicotine - Inhaler 1 Each Inhalation daily    MEDICATIONS  (PRN):  acetaminophen     Tablet .. 650 milliGRAM(s) Oral every 6 hours PRN Moderate Pain (4 - 6)      Vital Signs Last 24 Hrs  T(C): 36.3 (30 Nov 2023 04:34), Max: 36.9 (29 Nov 2023 19:57)  T(F): 97.4 (30 Nov 2023 04:34), Max: 98.4 (29 Nov 2023 19:57)  HR: 71 (30 Nov 2023 04:34) (65 - 75)  BP: 132/76 (30 Nov 2023 04:34) (104/62 - 169/78)  BP(mean): --  RR: 18 (30 Nov 2023 04:34) (18 - 18)  SpO2: 99% (30 Nov 2023 04:34) (98% - 100%)  CAPILLARY BLOOD GLUCOSE        I&O's Summary    29 Nov 2023 07:01  -  30 Nov 2023 07:00  --------------------------------------------------------  IN: 520 mL / OUT: 0 mL / NET: 520 mL      tele:    PHYSICAL EXAM:    GENERAL: NAD   HEENT: EOMI, PERRL  PULM: Clear to auscultation bilaterally  CV: Regular rate and rhythm; nl S1, S2; No murmurs, rubs, or gallops  ABDOMEN: Soft, Nontender, Nondistended; Bowel sounds present  EXTREMITIES/MSK:  No edema, calf tenderness   PSYCH: AAOx3  NEUROLOGY: non-focal          LABS:                        13.9   9.27  )-----------( 348      ( 29 Nov 2023 07:29 )             43.3     11-29    143  |  106  |  16  ----------------------------<  91  3.6   |  24  |  0.90    Ca    9.2      29 Nov 2023 07:29  Phos  3.5     11-28  Mg     2.1     11-28    TPro  7.9  /  Alb  4.9  /  TBili  0.3  /  DBili  x   /  AST  16  /  ALT  11  /  AlkPhos  86  11-28    PT/INR - ( 28 Nov 2023 09:17 )   PT: 10.7 sec;   INR: 1.02 ratio         PTT - ( 28 Nov 2023 09:17 )  PTT:31.3 sec      Urinalysis Basic - ( 29 Nov 2023 07:29 )    Color: x / Appearance: x / SG: x / pH: x  Gluc: 91 mg/dL / Ketone: x  / Bili: x / Urobili: x   Blood: x / Protein: x / Nitrite: x   Leuk Esterase: x / RBC: x / WBC x   Sq Epi: x / Non Sq Epi: x / Bacteria: x          Culture - Tissue with Gram Stain (collected 28 Nov 2023 18:16)  Source: .Tissue Other  Gram Stain (29 Nov 2023 02:59):    Rare polymorphonuclear leukocytes seen per low power field    Numerous Gram positive cocci in pairs seen per oil power field    Numerous Gram Negative Rods seen per oil power field  Preliminary Report (29 Nov 2023 19:50):    Numerous Most closely resembling Pseudomonas species    Numerous Proteus mirabilis    Moderate Escherichia coli    Moderate Enterococcus faecalis    Moderate Staphylococcus aureus    Culture - Blood (collected 28 Nov 2023 08:10)  Source: .Blood Blood-Peripheral  Preliminary Report (29 Nov 2023 14:02):    No growth at 24 hours    Culture - Blood (collected 28 Nov 2023 08:00)  Source: .Blood Blood-Peripheral  Preliminary Report (29 Nov 2023 14:02):    No growth at 24 hours      RADIOLOGY & ADDITIONAL TESTS:    Imaging Personally Reviewed:    Consultant(s) Notes Reviewed:      Care Discussed with Consultants/Other Providers:   Italo Patel   contact via pager or TEAMS        CC: Patient is a 28y old  Male who presents with a chief complaint of Worsening Lower extremity wound (30 Nov 2023 08:55)      SUBJECTIVE / OVERNIGHT EVENTS: no f/c/r/n/v. Had wound pain after debridement yest and if dressing change today. Has intermittent pain throughout the day    MEDICATIONS  (STANDING):  ceFAZolin   IVPB 1000 milliGRAM(s) IV Intermittent every 8 hours  chlorhexidine 2% Cloths 1 Application(s) Topical <User Schedule>  Dakins Solution - 1/2 Strength 1 Application(s) Topical daily  influenza   Vaccine 0.5 milliLiter(s) IntraMuscular once  nicotine - Inhaler 1 Each Inhalation daily    MEDICATIONS  (PRN):  acetaminophen     Tablet .. 650 milliGRAM(s) Oral every 6 hours PRN Moderate Pain (4 - 6)      Vital Signs Last 24 Hrs  T(C): 36.3 (30 Nov 2023 04:34), Max: 36.9 (29 Nov 2023 19:57)  T(F): 97.4 (30 Nov 2023 04:34), Max: 98.4 (29 Nov 2023 19:57)  HR: 71 (30 Nov 2023 04:34) (65 - 75)  BP: 132/76 (30 Nov 2023 04:34) (104/62 - 169/78)  BP(mean): --  RR: 18 (30 Nov 2023 04:34) (18 - 18)  SpO2: 99% (30 Nov 2023 04:34) (98% - 100%)  CAPILLARY BLOOD GLUCOSE        I&O's Summary    29 Nov 2023 07:01  -  30 Nov 2023 07:00  --------------------------------------------------------  IN: 520 mL / OUT: 0 mL / NET: 520 mL        PHYSICAL EXAM:    GENERAL: NAD   HEENT: EOMI, PERRL  PULM: Clear to auscultation bilaterally  CV: Regular rate and rhythm; nl S1, S2; No murmurs, rubs, or gallops  ABDOMEN: Soft, Nontender, Nondistended; Bowel sounds present  EXTREMITIES/MSK:  No edema, calf tenderness. No erythema/induration surrounding wound.  PSYCH: AAOx3  NEUROLOGY: non-focal          LABS:                        13.9   9.27  )-----------( 348      ( 29 Nov 2023 07:29 )             43.3     11-29    143  |  106  |  16  ----------------------------<  91  3.6   |  24  |  0.90    Ca    9.2      29 Nov 2023 07:29  Phos  3.5     11-28  Mg     2.1     11-28    TPro  7.9  /  Alb  4.9  /  TBili  0.3  /  DBili  x   /  AST  16  /  ALT  11  /  AlkPhos  86  11-28    PT/INR - ( 28 Nov 2023 09:17 )   PT: 10.7 sec;   INR: 1.02 ratio         PTT - ( 28 Nov 2023 09:17 )  PTT:31.3 sec      Urinalysis Basic - ( 29 Nov 2023 07:29 )    Color: x / Appearance: x / SG: x / pH: x  Gluc: 91 mg/dL / Ketone: x  / Bili: x / Urobili: x   Blood: x / Protein: x / Nitrite: x   Leuk Esterase: x / RBC: x / WBC x   Sq Epi: x / Non Sq Epi: x / Bacteria: x          Culture - Tissue with Gram Stain (collected 28 Nov 2023 18:16)  Source: .Tissue Other  Gram Stain (29 Nov 2023 02:59):    Rare polymorphonuclear leukocytes seen per low power field    Numerous Gram positive cocci in pairs seen per oil power field    Numerous Gram Negative Rods seen per oil power field  Preliminary Report (29 Nov 2023 19:50):    Numerous Most closely resembling Pseudomonas species    Numerous Proteus mirabilis    Moderate Escherichia coli    Moderate Enterococcus faecalis    Moderate Staphylococcus aureus    Culture - Blood (collected 28 Nov 2023 08:10)  Source: .Blood Blood-Peripheral  Preliminary Report (29 Nov 2023 14:02):    No growth at 24 hours    Culture - Blood (collected 28 Nov 2023 08:00)  Source: .Blood Blood-Peripheral  Preliminary Report (29 Nov 2023 14:02):    No growth at 24 hours      RADIOLOGY & ADDITIONAL TESTS:    Imaging Personally Reviewed:    Consultant(s) Notes Reviewed:      Care Discussed with Consultants/Other Providers: JESSY

## 2023-11-30 NOTE — PROGRESS NOTE ADULT - SUBJECTIVE AND OBJECTIVE BOX
VASCULAR SURGERY DAILY PROGRESS NOTE:     SUBJECTIVE/ROS: No acute events overnight. Patient seen and examined at bedside by surgical team.  RLE dressing changed. Improved wound. Resolved odor.      OBJECTIVE:  Vital Signs Last 24 Hrs  T(C): 36.3 (30 Nov 2023 04:34), Max: 36.9 (29 Nov 2023 19:57)  T(F): 97.4 (30 Nov 2023 04:34), Max: 98.4 (29 Nov 2023 19:57)  HR: 71 (30 Nov 2023 04:34) (65 - 75)  BP: 132/76 (30 Nov 2023 04:34) (104/62 - 169/78)  BP(mean): --  RR: 18 (30 Nov 2023 04:34) (18 - 18)  SpO2: 99% (30 Nov 2023 04:34) (98% - 100%)    Parameters below as of 30 Nov 2023 04:34  Patient On (Oxygen Delivery Method): room air    PHYSICAL EXAM:  Constitutional: NAD  Respiratory: non-labored breathing, patent airway  Extremities: warm  Neurological: intact  RLE: venostasis skin changes, 2cm wound on medial aspect of lower RLE, nonmalodorous, scant serous drainage, granulating tissue                          13.9   9.27  )-----------( 348      ( 29 Nov 2023 07:29 )             43.3     11-29    143  |  106  |  16  ----------------------------<  91  3.6   |  24  |  0.90    Ca    9.2      29 Nov 2023 07:29  Phos  3.5     11-28  Mg     2.1     11-28    TPro  7.9  /  Alb  4.9  /  TBili  0.3  /  DBili  x   /  AST  16  /  ALT  11  /  AlkPhos  86  11-28   PT/INR - ( 28 Nov 2023 09:17 )   PT: 10.7 sec;   INR: 1.02 ratio         PTT - ( 28 Nov 2023 09:17 )  PTT:31.3 sec  I&O's Detail    29 Nov 2023 07:01  -  30 Nov 2023 07:00  --------------------------------------------------------  IN:    IV PiggyBack: 100 mL    Oral Fluid: 420 mL  Total IN: 520 mL    OUT:  Total OUT: 0 mL    Total NET: 520 mL

## 2023-12-01 ENCOUNTER — TRANSCRIPTION ENCOUNTER (OUTPATIENT)
Age: 28
End: 2023-12-01

## 2023-12-01 ENCOUNTER — APPOINTMENT (OUTPATIENT)
Dept: INTERNAL MEDICINE | Facility: CLINIC | Age: 28
End: 2023-12-01

## 2023-12-01 VITALS
SYSTOLIC BLOOD PRESSURE: 157 MMHG | RESPIRATION RATE: 18 BRPM | HEART RATE: 79 BPM | OXYGEN SATURATION: 100 % | DIASTOLIC BLOOD PRESSURE: 77 MMHG | TEMPERATURE: 98 F

## 2023-12-01 LAB
ANION GAP SERPL CALC-SCNC: 11 MMOL/L — SIGNIFICANT CHANGE UP (ref 5–17)
BUN SERPL-MCNC: 12 MG/DL — SIGNIFICANT CHANGE UP (ref 7–23)
CALCIUM SERPL-MCNC: 8.8 MG/DL — SIGNIFICANT CHANGE UP (ref 8.4–10.5)
CHLORIDE SERPL-SCNC: 107 MMOL/L — SIGNIFICANT CHANGE UP (ref 96–108)
CO2 SERPL-SCNC: 23 MMOL/L — SIGNIFICANT CHANGE UP (ref 22–31)
CREAT SERPL-MCNC: 0.82 MG/DL — SIGNIFICANT CHANGE UP (ref 0.5–1.3)
EGFR: 123 ML/MIN/1.73M2 — SIGNIFICANT CHANGE UP
GLUCOSE SERPL-MCNC: 129 MG/DL — HIGH (ref 70–99)
HCT VFR BLD CALC: 46.4 % — SIGNIFICANT CHANGE UP (ref 39–50)
HGB BLD-MCNC: 14.8 G/DL — SIGNIFICANT CHANGE UP (ref 13–17)
MCHC RBC-ENTMCNC: 27.9 PG — SIGNIFICANT CHANGE UP (ref 27–34)
MCHC RBC-ENTMCNC: 31.9 GM/DL — LOW (ref 32–36)
MCV RBC AUTO: 87.4 FL — SIGNIFICANT CHANGE UP (ref 80–100)
NRBC # BLD: 0 /100 WBCS — SIGNIFICANT CHANGE UP (ref 0–0)
PLATELET # BLD AUTO: 380 K/UL — SIGNIFICANT CHANGE UP (ref 150–400)
POTASSIUM SERPL-MCNC: 3.7 MMOL/L — SIGNIFICANT CHANGE UP (ref 3.5–5.3)
POTASSIUM SERPL-SCNC: 3.7 MMOL/L — SIGNIFICANT CHANGE UP (ref 3.5–5.3)
RBC # BLD: 5.31 M/UL — SIGNIFICANT CHANGE UP (ref 4.2–5.8)
RBC # FLD: 12.8 % — SIGNIFICANT CHANGE UP (ref 10.3–14.5)
SODIUM SERPL-SCNC: 141 MMOL/L — SIGNIFICANT CHANGE UP (ref 135–145)
WBC # BLD: 10.05 K/UL — SIGNIFICANT CHANGE UP (ref 3.8–10.5)
WBC # FLD AUTO: 10.05 K/UL — SIGNIFICANT CHANGE UP (ref 3.8–10.5)

## 2023-12-01 PROCEDURE — 84132 ASSAY OF SERUM POTASSIUM: CPT

## 2023-12-01 PROCEDURE — 83735 ASSAY OF MAGNESIUM: CPT

## 2023-12-01 PROCEDURE — 85018 HEMOGLOBIN: CPT

## 2023-12-01 PROCEDURE — 85610 PROTHROMBIN TIME: CPT

## 2023-12-01 PROCEDURE — 80053 COMPREHEN METABOLIC PANEL: CPT

## 2023-12-01 PROCEDURE — 87075 CULTR BACTERIA EXCEPT BLOOD: CPT

## 2023-12-01 PROCEDURE — 93971 EXTREMITY STUDY: CPT

## 2023-12-01 PROCEDURE — 82330 ASSAY OF CALCIUM: CPT

## 2023-12-01 PROCEDURE — 85014 HEMATOCRIT: CPT

## 2023-12-01 PROCEDURE — 87186 SC STD MICRODIL/AGAR DIL: CPT

## 2023-12-01 PROCEDURE — 96374 THER/PROPH/DIAG INJ IV PUSH: CPT

## 2023-12-01 PROCEDURE — 85730 THROMBOPLASTIN TIME PARTIAL: CPT

## 2023-12-01 PROCEDURE — 82803 BLOOD GASES ANY COMBINATION: CPT

## 2023-12-01 PROCEDURE — 80048 BASIC METABOLIC PNL TOTAL CA: CPT

## 2023-12-01 PROCEDURE — 82435 ASSAY OF BLOOD CHLORIDE: CPT

## 2023-12-01 PROCEDURE — 73590 X-RAY EXAM OF LOWER LEG: CPT

## 2023-12-01 PROCEDURE — 96375 TX/PRO/DX INJ NEW DRUG ADDON: CPT

## 2023-12-01 PROCEDURE — 86803 HEPATITIS C AB TEST: CPT

## 2023-12-01 PROCEDURE — 36415 COLL VENOUS BLD VENIPUNCTURE: CPT

## 2023-12-01 PROCEDURE — 87077 CULTURE AEROBIC IDENTIFY: CPT

## 2023-12-01 PROCEDURE — 87040 BLOOD CULTURE FOR BACTERIA: CPT

## 2023-12-01 PROCEDURE — 82947 ASSAY GLUCOSE BLOOD QUANT: CPT

## 2023-12-01 PROCEDURE — 83605 ASSAY OF LACTIC ACID: CPT

## 2023-12-01 PROCEDURE — 84100 ASSAY OF PHOSPHORUS: CPT

## 2023-12-01 PROCEDURE — 84295 ASSAY OF SERUM SODIUM: CPT

## 2023-12-01 PROCEDURE — 85027 COMPLETE CBC AUTOMATED: CPT

## 2023-12-01 PROCEDURE — 99239 HOSP IP/OBS DSCHRG MGMT >30: CPT

## 2023-12-01 PROCEDURE — 85025 COMPLETE CBC W/AUTO DIFF WBC: CPT

## 2023-12-01 PROCEDURE — 87070 CULTURE OTHR SPECIMN AEROBIC: CPT

## 2023-12-01 PROCEDURE — 99285 EMERGENCY DEPT VISIT HI MDM: CPT

## 2023-12-01 RX ORDER — SODIUM HYPOCHLORITE 0.125 %
1 SOLUTION, NON-ORAL MISCELLANEOUS
Qty: 1 | Refills: 0
Start: 2023-12-01

## 2023-12-01 RX ORDER — CIPROFLOXACIN LACTATE 400MG/40ML
1 VIAL (ML) INTRAVENOUS
Qty: 18 | Refills: 0
Start: 2023-12-01 | End: 2023-12-09

## 2023-12-01 RX ORDER — CIPROFLOXACIN LACTATE 400MG/40ML
500 VIAL (ML) INTRAVENOUS EVERY 12 HOURS
Refills: 0 | Status: DISCONTINUED | OUTPATIENT
Start: 2023-12-01 | End: 2023-12-01

## 2023-12-01 RX ORDER — NICOTINE POLACRILEX 2 MG
1 GUM BUCCAL DAILY
Refills: 0 | Status: DISCONTINUED | OUTPATIENT
Start: 2023-12-01 | End: 2023-12-01

## 2023-12-01 RX ORDER — SODIUM HYPOCHLORITE 0.125 %
1 SOLUTION, NON-ORAL MISCELLANEOUS
Qty: 1 | Refills: 0
Start: 2023-12-01 | End: 2023-12-21

## 2023-12-01 RX ADMIN — CHLORHEXIDINE GLUCONATE 1 APPLICATION(S): 213 SOLUTION TOPICAL at 05:32

## 2023-12-01 RX ADMIN — Medication 1 PATCH: at 15:12

## 2023-12-01 RX ADMIN — PIPERACILLIN AND TAZOBACTAM 25 GRAM(S): 4; .5 INJECTION, POWDER, LYOPHILIZED, FOR SOLUTION INTRAVENOUS at 05:11

## 2023-12-01 RX ADMIN — Medication 500 MILLIGRAM(S): at 15:11

## 2023-12-01 RX ADMIN — Medication 1 TABLET(S): at 15:11

## 2023-12-01 RX ADMIN — Medication 1 APPLICATION(S): at 15:14

## 2023-12-01 NOTE — DISCHARGE NOTE NURSING/CASE MANAGEMENT/SOCIAL WORK - NSDCPEEMAIL_GEN_ALL_CORE
[employer] for Tobacco Control email [email or web address] for [email or web address]

## 2023-12-01 NOTE — DISCHARGE NOTE PROVIDER - NSDCCPCAREPLAN_GEN_ALL_CORE_FT
PRINCIPAL DISCHARGE DIAGNOSIS  Diagnosis: Wound of right lower extremity  Assessment and Plan of Treatment: you were treated with wound care and antibiotocs  continue daily wound care  continue antibiotocs through 12/9  Follow up with vascular surgery and wound care      SECONDARY DISCHARGE DIAGNOSES  Diagnosis: Acute pain  Assessment and Plan of Treatment: pain controoled on Tylenol and NSAIDS     PRINCIPAL DISCHARGE DIAGNOSIS  Diagnosis: Wound of right lower extremity  Assessment and Plan of Treatment: you were treated with wound care and antibiotocs  continue daily wound care: 1/2 strength dakins soaked gauze cut to just fill defect. Cover with dry gauze. Secure with kerlix. ACE wrap with gentle compression.  continue antibiotocs through 12/9  Follow up with vascular surgery, wound center and ID next week      SECONDARY DISCHARGE DIAGNOSES  Diagnosis: Acute pain  Assessment and Plan of Treatment: pain controoled on Tylenol and NSAIDS
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
## 2023-12-01 NOTE — DISCHARGE NOTE PROVIDER - NSDCMRMEDTOKEN_GEN_ALL_CORE_FT
amoxicillin-clavulanate 875 mg-125 mg oral tablet: 875 milligram(s) orally every 12 hours start at 10 pm 12/1/23  Cipro 500 mg oral tablet: 1 tab(s) orally every 12 hours start at 10 pm 12/1/23   amoxicillin-clavulanate 875 mg-125 mg oral tablet: 875 milligram(s) orally every 12 hours start at 10 pm 12/1/23  Cipro 500 mg oral tablet: 1 tab(s) orally every 12 hours start at 10 pm 12/1/23  Dakins Half Strength 0.25% topical solution: Apply topically to affected area once a day 1 Apply topically to affected area once a day MDD: 1

## 2023-12-01 NOTE — DISCHARGE NOTE PROVIDER - NSDCFUADDAPPT_GEN_ALL_CORE_FT
the visiting nurse will contact you to schedule your appointment.   APPTS ARE READY TO BE MADE: [x ] YES    Best Family or Patient Contact (if needed):    Additional Information about above appointments (if needed):    1:   2:   3:     Other comments or requests:   Wound Center [address] [place] [address] [phone] follow up in 1 week  the visiting nurse will contact you to schedule your appointment.   APPTS ARE READY TO BE MADE: [x ] YES    Best Family or Patient Contact (if needed):    Additional Information about above appointments (if needed):    1:   2:   3:     Other comments or requests:   Wound Center [address] [place] [address] [phone] follow up in 1 week  the visiting nurse will contact you to schedule your appointment.   APPTS ARE READY TO BE MADE: [x ] YES    Best Family or Patient Contact (if needed):    Additional Information about above appointments (if needed):    1:   2:   3:     Other comments or requests:   Wound Center [address] [place] [address] [phone] follow up in 1 week  the visiting nurse will contact you to schedule your appointment.   APPTS ARE READY TO BE MADE: [x ] YES    Best Family or Patient Contact (if needed):    Additional Information about above appointments (if needed):    1:   2:   3:     Other comments or requests:   Wound Center [address] [facility] [phone] follow up in 1 week     Patient is scheduled for a vascular appointment on 12/8 at 12:45pm at [address] [facility], [address] with Dr. Brandon Salomon     Patient was scheduled for an appointment with Dr. Tez Yeh for infectious disease on 12/13 at 2:30pm at [address] [place] [address].     Patient was scheduled for an appointment with PCP Dr. Lin Berrios on 12/6 at 1:40pm at [address] [facility]     Patient was scheduled with Dr. Sahra Gomez on 12/21 at 11:30am at [address] [facility] [address] for wound care. The office will reach out to the patient with a sooner appointment if they are able to accommodate.  the visiting nurse will contact you to schedule your appointment.   APPTS ARE READY TO BE MADE: [x ] YES    Best Family or Patient Contact (if needed):    Additional Information about above appointments (if needed):    1:   2:   3:     Other comments or requests:   Wound Center [address] [facility] [phone] follow up in 1 week     Patient is scheduled for a vascular appointment on 12/8 at 12:45pm at [address] [facility], [address] with Dr. Brandon Salomon     Patient was scheduled for an appointment with Dr. Tez Yeh for infectious disease on 12/13 at 2:30pm at [address] [place] [address].     Patient was scheduled for an appointment with PCP Dr. Lin Berrios on 12/6 at 1:40pm at [address] [facility]     Patient was scheduled with Dr. Sahra Gomez on 12/21 at 11:30am at [address] [facility] [address] for wound care. The office will reach out to the patient with a sooner appointment if they are able to accommodate.  the visiting nurse will contact you to schedule your appointment.   APPTS ARE READY TO BE MADE: [x ] YES    Best Family or Patient Contact (if needed):    Additional Information about above appointments (if needed):    1:   2:   3:     Other comments or requests:   Wound Center [address] [facility] [phone] follow up in 1 week     Patient is scheduled for a vascular appointment on 12/8 at 12:45pm at [address] [facility], [address] with Dr. Brandon Salomon     Patient was scheduled for an appointment with Dr. Tez Yeh for infectious disease on 12/13 at 2:30pm at [address] [place] [address].     Patient was scheduled for an appointment with PCP Dr. Lin Berrios on 12/6 at 1:40pm at [address] [facility]     Patient was scheduled with Dr. Sahra Gomez on 12/21 at 11:30am at [address] [facility] [address] for wound care. The office will reach out to the patient with a sooner appointment if they are able to accommodate.

## 2023-12-01 NOTE — PROGRESS NOTE ADULT - SUBJECTIVE AND OBJECTIVE BOX
Italo Patel   contact via pager or TEAMS        CC: Patient is a 28y old  Male who presents with a chief complaint of Worsening Lower extremity wound (30 Nov 2023 17:51)      SUBJECTIVE / OVERNIGHT EVENTS:    MEDICATIONS  (STANDING):  chlorhexidine 2% Cloths 1 Application(s) Topical <User Schedule>  Dakins Solution - 1/2 Strength 1 Application(s) Topical daily  influenza   Vaccine 0.5 milliLiter(s) IntraMuscular once  nicotine - Inhaler 1 Each Inhalation daily  piperacillin/tazobactam IVPB.. 3.375 Gram(s) IV Intermittent every 8 hours    MEDICATIONS  (PRN):  acetaminophen     Tablet .. 650 milliGRAM(s) Oral every 6 hours PRN Moderate Pain (4 - 6)  ibuprofen  Tablet. 400 milliGRAM(s) Oral four times a day PRN breakthrough pain      Vital Signs Last 24 Hrs  T(C): 36.7 (01 Dec 2023 04:48), Max: 36.7 (30 Nov 2023 19:42)  T(F): 98 (01 Dec 2023 04:48), Max: 98.1 (30 Nov 2023 19:42)  HR: 79 (01 Dec 2023 04:48) (78 - 89)  BP: 157/77 (01 Dec 2023 04:48) (119/72 - 157/77)  BP(mean): --  RR: 18 (01 Dec 2023 04:48) (18 - 18)  SpO2: 100% (01 Dec 2023 04:48) (98% - 100%)  CAPILLARY BLOOD GLUCOSE        I&O's Summary    30 Nov 2023 07:01  -  01 Dec 2023 07:00  --------------------------------------------------------  IN: 1360 mL / OUT: 0 mL / NET: 1360 mL      tele:    PHYSICAL EXAM:    GENERAL: NAD   HEENT: EOMI, PERRL  PULM: Clear to auscultation bilaterally  CV: Regular rate and rhythm; nl S1, S2; No murmurs, rubs, or gallops  ABDOMEN: Soft, Nontender, Nondistended; Bowel sounds present  EXTREMITIES/MSK:  No edema, calf tenderness   PSYCH: AAOx3  NEUROLOGY: non-focal          LABS:                      Culture - Tissue with Gram Stain (collected 28 Nov 2023 18:16)  Source: .Tissue Other  Gram Stain (29 Nov 2023 02:59):    Rare polymorphonuclear leukocytes seen per low power field    Numerous Gram positive cocci in pairs seen per oil power field    Numerous Gram Negative Rods seen per oil power field  Final Report (30 Nov 2023 21:49):    Numerous Most closely resembling Pseudomonas species    Numerous Proteus mirabilis    Moderate Escherichia coli    Moderate Enterococcus faecalis    Moderate Staphylococcus aureus    Few Bacteroides thetaiotaomicron group "Susceptibilities not performed"  Organism: Pseudomonas species  Proteus mirabilis  Escherichia coli  Enterococcus faecalis  Staphylococcus aureus (30 Nov 2023 21:49)  Organism: Staphylococcus aureus (30 Nov 2023 21:49)  Organism: Enterococcus faecalis (30 Nov 2023 21:49)  Organism: Escherichia coli (30 Nov 2023 21:49)  Organism: Proteus mirabilis (30 Nov 2023 21:49)  Organism: Pseudomonas species (30 Nov 2023 21:49)      RADIOLOGY & ADDITIONAL TESTS:    Imaging Personally Reviewed:    Consultant(s) Notes Reviewed:      Care Discussed with Consultants/Other Providers:   Itaol Patel   contact via pager or TEAMS        CC: Patient is a 28y old  Male who presents with a chief complaint of Worsening Lower extremity wound (30 Nov 2023 17:51)      SUBJECTIVE / OVERNIGHT EVENTS:    MEDICATIONS  (STANDING):  chlorhexidine 2% Cloths 1 Application(s) Topical <User Schedule>  Dakins Solution - 1/2 Strength 1 Application(s) Topical daily  influenza   Vaccine 0.5 milliLiter(s) IntraMuscular once  nicotine - Inhaler 1 Each Inhalation daily  piperacillin/tazobactam IVPB.. 3.375 Gram(s) IV Intermittent every 8 hours    MEDICATIONS  (PRN):  acetaminophen     Tablet .. 650 milliGRAM(s) Oral every 6 hours PRN Moderate Pain (4 - 6)  ibuprofen  Tablet. 400 milliGRAM(s) Oral four times a day PRN breakthrough pain      Vital Signs Last 24 Hrs  T(C): 36.7 (01 Dec 2023 04:48), Max: 36.7 (30 Nov 2023 19:42)  T(F): 98 (01 Dec 2023 04:48), Max: 98.1 (30 Nov 2023 19:42)  HR: 79 (01 Dec 2023 04:48) (78 - 89)  BP: 157/77 (01 Dec 2023 04:48) (119/72 - 157/77)  BP(mean): --  RR: 18 (01 Dec 2023 04:48) (18 - 18)  SpO2: 100% (01 Dec 2023 04:48) (98% - 100%)  CAPILLARY BLOOD GLUCOSE        I&O's Summary    30 Nov 2023 07:01  -  01 Dec 2023 07:00  --------------------------------------------------------  IN: 1360 mL / OUT: 0 mL / NET: 1360 mL      tele:    PHYSICAL EXAM:    GENERAL: NAD   HEENT: EOMI, PERRL  PULM: Clear to auscultation bilaterally  CV: Regular rate and rhythm; nl S1, S2; No murmurs, rubs, or gallops  ABDOMEN: Soft, Nontender, Nondistended; Bowel sounds present  EXTREMITIES/MSK:  No edema, calf tenderness   PSYCH: AAOx3  NEUROLOGY: non-focal          LABS:                      Culture - Tissue with Gram Stain (collected 28 Nov 2023 18:16)  Source: .Tissue Other  Gram Stain (29 Nov 2023 02:59):    Rare polymorphonuclear leukocytes seen per low power field    Numerous Gram positive cocci in pairs seen per oil power field    Numerous Gram Negative Rods seen per oil power field  Final Report (30 Nov 2023 21:49):    Numerous Most closely resembling Pseudomonas species    Numerous Proteus mirabilis    Moderate Escherichia coli    Moderate Enterococcus faecalis    Moderate Staphylococcus aureus    Few Bacteroides thetaiotaomicron group "Susceptibilities not performed"  Organism: Pseudomonas species  Proteus mirabilis  Escherichia coli  Enterococcus faecalis  Staphylococcus aureus (30 Nov 2023 21:49)  Organism: Staphylococcus aureus (30 Nov 2023 21:49)  Organism: Enterococcus faecalis (30 Nov 2023 21:49)  Organism: Escherichia coli (30 Nov 2023 21:49)  Organism: Proteus mirabilis (30 Nov 2023 21:49)  Organism: Pseudomonas species (30 Nov 2023 21:49)      RADIOLOGY & ADDITIONAL TESTS:    Imaging Personally Reviewed:    Consultant(s) Notes Reviewed:      Care Discussed with Consultants/Other Providers:

## 2023-12-01 NOTE — DISCHARGE NOTE PROVIDER - NPI NUMBER (FOR SYSADMIN USE ONLY) :
[[id number]],[[id number]] [[id number]],[[id number]] [[id number]],[[id number]] [[id number]],[[id number]],[[id number]],[UNKNOWN] [[id number]],[[id number]],[[id number]],[UNKNOWN] [[id number]],[[id number]],[[id number]],[UNKNOWN]

## 2023-12-01 NOTE — DISCHARGE NOTE NURSING/CASE MANAGEMENT/SOCIAL WORK - NSDCPEHOTLINE_GEN_ALL_CORE
[facility] Smokers Quitline [phone] ([phone]) [facility] Smokers Quitline [phone] ([phone]) [facility] Smokers Quitline [phone] ([phone])

## 2023-12-01 NOTE — DISCHARGE NOTE PROVIDER - PROVIDER TOKENS
PROVIDER:[TOKEN:[[id number]],FOLLOWUP:[2 weeks]],PROVIDER:[TOKEN:[[id number]],FOLLOWUP:[2 weeks]] PROVIDER:[TOKEN:[[id number]],FOLLOWUP:[1-3 days]],PROVIDER:[TOKEN:[[id number]],FOLLOWUP:[1-3 days]],PROVIDER:[TOKEN:[[id number]],FOLLOWUP:[1-3 days]],FREE:[LAST:[wound],PHONE:[(   )    -],FAX:[(   )    -],ADDRESS:[Wound Center [address] [place] [address] [phone]],FOLLOWUP:[1-3 days]] PROVIDER:[TOKEN:[[id number]],FOLLOWUP:[1-3 days]],PROVIDER:[TOKEN:[[id number]],FOLLOWUP:[1-3 days]],PROVIDER:[TOKEN:[[id number]],FOLLOWUP:[1-3 days]],FREE:[LAST:[wound],PHONE:[(   )    -],FAX:[(   )    -],ADDRESS:[Wound Center [address] [place] [address] [phone]],FOLLOWUP:[1-3 days]] PROVIDER:[TOKEN:[[id number]],FOLLOWUP:[1-3 days]],PROVIDER:[TOKEN:[[id number]],FOLLOWUP:[1-3 days]],PROVIDER:[TOKEN:[[id number]],FOLLOWUP:[1-3 days]],FREE:[LAST:[wound],PHONE:[(   )    -],FAX:[(   )    -],ADDRESS:[Wound Center [address] [place] [address] [phone]],FOLLOWUP:[1-3 days]]
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
## 2023-12-01 NOTE — DISCHARGE NOTE PROVIDER - CARE PROVIDERS DIRECT ADDRESSES
,DirectAddress_Unknown,[email or web address] ,DirectAddress_Unknown,[email or web address] ,DirectAddress_Unknown,[email or web address] ,DirectAddress_Unknown,[email or web address],DirectAddress_Unknown,DirectAddress_Unknown ,DirectAddress_Unknown,[email or web address],DirectAddress_Unknown,DirectAddress_Unknown ,DirectAddress_Unknown,[email or web address],DirectAddress_Unknown,DirectAddress_Unknown

## 2023-12-01 NOTE — DISCHARGE NOTE NURSING/CASE MANAGEMENT/SOCIAL WORK - NSDCPEWEB_GEN_ALL_CORE
[facility] for Tobacco Control website --- http://[facility]/quitsmoking/[place] website --- www.[facility] Psychiatric CenterCallix Brasilfrdiandra.com [facility] for Tobacco Control website --- http://[facility]/quitsmoking/[place] website --- www.[facility] Health + HospitalsRezeefrdiandra.com [facility] for Tobacco Control website --- http://[facility]/quitsmoking/[place] website --- www.[facility] CenterDDx Mediafrdiandra.com

## 2023-12-01 NOTE — DISCHARGE NOTE NURSING/CASE MANAGEMENT/SOCIAL WORK - NSDCPEFALRISK_GEN_ALL_CORE
For information on Fall & Injury Prevention, visit: https://www.[facility]/news/fall-prevention-protects-and-maintains-health-and-mobility OR  https://www.[facility]/news/fall-prevention-tips-to-avoid-injury OR  https://www.cdc.gov/steadi/patient.html For information on Fall & Injury Prevention, visit: https://www.[facility]/news/fall-prevention-protects-and-maintains-health-and-mobility OR  https://www.[facility]/news/fall-prevention-tips-to-avoid-injury OR  https://www.cdc.gov/steadi/patient.html For information on Fall & Injury Prevention, visit: https://www.[facility]/news/fall-prevention-protects-and-maintains-health-and-mobility OR  https://www.[facility]/news/fall-prevention-tips-to-avoid-injury OR  https://www.cdc.gov/steadi/patient.html

## 2023-12-01 NOTE — DISCHARGE NOTE PROVIDER - CARE PROVIDER_API CALL
Brandon Salomon  Vascular Surgery  [address] [facility], [address]  [place] [address]  Phone: [phone]  Fax: [phone]  Follow Up Time: 2 weeks    Bryan Valiente  Infectious Disease  [address] [place] [address]  Phone: [phone]  Fax: [phone]  Follow Up Time: 2 weeks   Brandon Salomon  Vascular Surgery  [address] [facility], [address]  [place] [address]  Phone: [phone]  Fax: [phone]  Follow Up Time: 2 weeks    Bryan Valiente  Infectious Disease  [address] [place] [address]  Phone: [phone]  Fax: [phone]  Follow Up Time: 2 weeks   Brandon Salomon  Vascular Surgery  [address] [facility], [address]  [place] [address]  Phone: [phone]  Fax: [phone]  Follow Up Time: 2 weeks    Bryan Valiente  Infectious Disease  [address] [place] [address]  Phone: [phone]  Fax: [phone]  Follow Up Time: 2 weeks   Brandon Salomon  Vascular Surgery  [address] [facility], [address]  [place] [address]  Phone: [phone]  Fax: [phone]  Follow Up Time: 1-3 days    Bryan Valiente  Infectious Disease  [address] [place] [address]  Phone: [phone]  Fax: [phone]  Follow Up Time: 1-3 days    Lin Berrios  Family Medicine  [address] [facility], [address]  [place] [address]  Phone: [phone]  Fax: [phone]  Follow Up Time: 1-3 days    wound,   Wound Center [address] [facility] [phone]  Phone: (   )    -  Fax: (   )    -  Follow Up Time: 1-3 days   Brandon Salomon  Vascular Surgery  [address] [facility], [address]  [place] [address]  Phone: [phone]  Fax: [phone]  Follow Up Time: 1-3 days    Bryan Valiente  Infectious Disease  [address] [place] [address]  Phone: [phone]  Fax: [phone]  Follow Up Time: 1-3 days    Lin Berrios  Family Medicine  [address] [facility], [address]  [place] [address]  Phone: [phone]  Fax: [phone]  Follow Up Time: 1-3 days    wound,   Wound Center [address] [facility] [phone]  Phone: (   )    -  Fax: (   )    -  Follow Up Time: 1-3 days   Brandon Salomon  Vascular Surgery  [address] [facility], [address]  [place] [address]  Phone: [phone]  Fax: [phone]  Follow Up Time: 1-3 days    Bryan Valiente  Infectious Disease  [address] [place] [address]  Phone: [phone]  Fax: [phone]  Follow Up Time: 1-3 days    Lin Berrios  Family Medicine  [address] [facility], [address]  [place] [address]  Phone: [phone]  Fax: [phone]  Follow Up Time: 1-3 days    wound,   Wound Center [address] [facility] [phone]  Phone: (   )    -  Fax: (   )    -  Follow Up Time: 1-3 days

## 2023-12-01 NOTE — PROGRESS NOTE ADULT - REASON FOR ADMISSION
Worsening Lower extremity wound

## 2023-12-01 NOTE — DISCHARGE NOTE NURSING/CASE MANAGEMENT/SOCIAL WORK - NSDCPEPAMP_GEN_ALL_CORE
“[facility] for [facility]” pamphlet given “[facility] for Tobacco [facility]” pamphlet given “[facility] for Tobacco Control” pamphlet given

## 2023-12-01 NOTE — CHART NOTE - NSCHARTNOTEFT_GEN_A_CORE
Not in room earlier, not seen by me    28 y.o man with RLE venous insufficiency, history of trauma to RLE, previous polysubstance abuse admitted 11/28/23 with worsening right lower extremity wound concerning for venous stasis dermatitis ulcer.  Local necrosis with malodour noted on exam.   No gross signs of infection of cellulitis  11/7/23  abscess culture with Staph aureus and Proteus mirabilis   11/9/23 HIV negative    < from: 12 Lead ECG (10.07.23 @ 09:30) >    Ventricular Rate 75 BPM    Atrial Rate 75 BPM    P-R Interval 182 ms    QRS Duration 90 ms    Q-T Interval 380 ms    QTC Calculation(Bazett) 424 ms    P Axis 61 degrees    R Axis 69 degrees    T Axis 67 degrees    Diagnosis Line NORMAL SINUS RHYTHM  NORMAL ECG  NO PREVIOUS ECGS AVAILABLE  Confirmed by RIASSA SAUNDERS MD ([id number]) on 10/7/2023 1:14:37 PM    < end of copied text >      - Recent Dermatology and Surgery input noted. C/f chronic lipodermatosclerosis 2/2 stasis dermatitis  - X-ray without evidence of osteo and previous MRI with sinus  - Previous MRI showing Cutaneous wound along the medial aspect of the calf at the level of the mid tibial diaphysis which communicates with a partially fluid-filled irregularly shaped defect in the medial subcutaneous tissues. No osteomyelitis    11/28 hepatitis C NEG  11/28 wound culture:  Pseudomonas aeruginosa, Proteus mirabilis, Escherichia coli, Enterococcus faecalis, Staphylococcus aureus    s/p  Ancef 11/28 -->11/30  Zosyn 11/30 -->    Infected venous ulcer  venous insufficiency RLE    Suggest  Continue Zosyn  while in hospital  wound care  can change to po Cipro 500 mg po Twice daily  AND Augmentin 875 mg po Twice daily when discharged  Continue antibiotics through 12/9 as long as wound improves    Please reconsult ID as needed Not in room earlier, not seen by me    28 y.o man with RLE venous insufficiency, history of trauma to RLE, previous polysubstance abuse admitted 11/28/23 with worsening right lower extremity wound concerning for venous stasis dermatitis ulcer.  Local necrosis with malodour noted on exam.   No gross signs of infection of cellulitis  11/7/23  abscess culture with Staph aureus and Proteus mirabilis   11/9/23 HIV negative    < from: 12 Lead ECG (10.07.23 @ 09:30) >    Ventricular Rate 75 BPM    Atrial Rate 75 BPM    P-R Interval 182 ms    QRS Duration 90 ms    Q-T Interval 380 ms    QTC Calculation(Bazett) 424 ms    P Axis 61 degrees    R Axis 69 degrees    T Axis 67 degrees    Diagnosis Line NORMAL SINUS RHYTHM  NORMAL ECG  NO PREVIOUS ECGS AVAILABLE  Confirmed by RAISSA SAUNDERS MD ([id number]) on 10/7/2023 1:14:37 PM    < end of copied text >      - Recent Dermatology and Surgery input noted. C/f chronic lipodermatosclerosis 2/2 stasis dermatitis  - X-ray without evidence of osteo and previous MRI with sinus  - Previous MRI showing Cutaneous wound along the medial aspect of the calf at the level of the mid tibial diaphysis which communicates with a partially fluid-filled irregularly shaped defect in the medial subcutaneous tissues. No osteomyelitis    11/28 hepatitis C NEG  11/28 wound culture:  Pseudomonas aeruginosa, Proteus mirabilis, Escherichia coli, Enterococcus faecalis, Staphylococcus aureus    s/p  Ancef 11/28 -->11/30  Zosyn 11/30 -->    Infected venous ulcer  venous insufficiency RLE    Suggest  Continue Zosyn  while in hospital  wound care  can change to po Cipro 500 mg po Twice daily  AND Augmentin 875 mg po Twice daily when discharged  Continue antibiotics through 12/9 as long as wound improves    Please reconsult ID as needed

## 2023-12-01 NOTE — DISCHARGE NOTE NURSING/CASE MANAGEMENT/SOCIAL WORK - PATIENT PORTAL LINK FT
You can access the FollowMyHealth Patient Portal offered by [facility] by registering at the following website: [email or web address]. By joining [employer]’s FollowMyHealth portal, you will also be able to view your health information using other applications (apps) compatible with our system. You can access the FollowMyHealth Patient Portal offered by [facility] by registering at the following website: [email or web address]. By joining [employer]’s FollowMyHealth portal, you will also be able to view your health information using other applications (apps) compatible with our system. You can access the FollowMyHealth Patient Portal offered by [facility] by registering at the following website: [email or web address]. By joining [employer]’s FollowMyHealth portal, you will also be able to view your health information using other applications (apps) compatible with our system.

## 2023-12-03 LAB

## 2023-12-05 ENCOUNTER — NON-APPOINTMENT (OUTPATIENT)
Age: 28
End: 2023-12-05

## 2023-12-07 ENCOUNTER — APPOINTMENT (OUTPATIENT)
Dept: WOUND CARE | Facility: CLINIC | Age: 28
End: 2023-12-07

## 2023-12-08 ENCOUNTER — EMERGENCY (EMERGENCY)
Facility: HOSPITAL | Age: 28
LOS: 1 days | Discharge: HOME | End: 2023-12-08
Attending: EMERGENCY MEDICINE
Payer: MEDICAID

## 2023-12-08 VITALS
DIASTOLIC BLOOD PRESSURE: 95 MMHG | HEIGHT: 71 IN | HEART RATE: 97 BPM | TEMPERATURE: 98 F | RESPIRATION RATE: 18 BRPM | SYSTOLIC BLOOD PRESSURE: 157 MMHG | WEIGHT: 199.96 LBS | OXYGEN SATURATION: 99 %

## 2023-12-08 VITALS
WEIGHT: 199.96 LBS | SYSTOLIC BLOOD PRESSURE: 152 MMHG | DIASTOLIC BLOOD PRESSURE: 83 MMHG | HEART RATE: 81 BPM | OXYGEN SATURATION: 100 % | TEMPERATURE: 97 F | RESPIRATION RATE: 18 BRPM | HEIGHT: 71 IN

## 2023-12-08 VITALS
TEMPERATURE: 98 F | HEART RATE: 84 BPM | OXYGEN SATURATION: 98 % | SYSTOLIC BLOOD PRESSURE: 130 MMHG | RESPIRATION RATE: 18 BRPM | DIASTOLIC BLOOD PRESSURE: 74 MMHG

## 2023-12-08 PROCEDURE — 99285 EMERGENCY DEPT VISIT HI MDM: CPT

## 2023-12-08 PROCEDURE — 99284 EMERGENCY DEPT VISIT MOD MDM: CPT

## 2023-12-08 PROCEDURE — 99283 EMERGENCY DEPT VISIT LOW MDM: CPT

## 2023-12-08 RX ORDER — CIPROFLOXACIN LACTATE 400MG/40ML
500 VIAL (ML) INTRAVENOUS ONCE
Refills: 0 | Status: COMPLETED | OUTPATIENT
Start: 2023-12-08 | End: 2023-12-08

## 2023-12-08 RX ADMIN — Medication 500 MILLIGRAM(S): at 05:52

## 2023-12-08 RX ADMIN — Medication 1 TABLET(S): at 05:52

## 2023-12-08 NOTE — ED ADULT NURSE NOTE - CHIEF COMPLAINT QUOTE
pt is handcuffed and under arrest accompanied by  with homeland security; here to eval for fit for confinement;  pain RLL; on oral antibiotics for infection to ulcer RLE

## 2023-12-08 NOTE — ED PROVIDER NOTE - OBJECTIVE STATEMENT
28-year-old male history of methamphetamine and polysubstance abuse recent discharge due to infected RLE wound, presents for the second time today to this emergency department stating "I need inpatient detox."  Patient states that he has been fatigued, and has right lower extremity pain but has a known right lower extremity ulcer for which he is on antibiotics for and due to see vascular surgery within the next 2 weeks.  Otherwise no recent fevers, chills, chest pain, shortness of breath, headaches, nausea, vomiting, diarrhea, other complaints

## 2023-12-08 NOTE — ED PROVIDER NOTE - ATTENDING CONTRIBUTION TO CARE
see mdm    edited by Irma Hunt DO - attending physician.   Please see progress notes for status/labs/consult updates and ED course after initial presentation.

## 2023-12-08 NOTE — ED ADULT NURSE NOTE - OBJECTIVE STATEMENT
Pt is a 28y M PMH venous insufficiency, polysubstance abuse p/w RLE and wanting methamphetamine detox. Pt reports wishing to detox from meth last use "36 hours ago." Pt denies IV use. Denies chest pain, SOB, fevers. Placed on cardiac monitor, 20G IV placed in R hand. A&Ox4, FERNANDO, lungs clear, distal pulses intact, abdomen soft, skin with ulceration to RLE with packing in place from outpatient facility. Side rails up for safety, call bell and personal items within reach, instructed to call for assistance, verbalizes understanding. Will continue to monitor.

## 2023-12-08 NOTE — ED PROVIDER NOTE - NSFOLLOWUPINSTRUCTIONS_ED_ALL_ED_FT
You were seen here for evaluation of your wound.    Continue Augmentin 875 mg-125mg every 12 hours x 2 days. Continue Ciprofloxacin 500 mg every 12 hours x 2 days.    Seek immediate medical care for symptoms including but not limited to:   -new fevers (temperature over 100.4)   -pus drainage from right lower leg wound  -or if you have any new/worsening concerns.    Please seen discharge instructions from 12/1/23 for outpatient follow up plan.    Read all attached.

## 2023-12-08 NOTE — ED PROVIDER NOTE - PROGRESS NOTE DETAILS
Attending (Sandoval Charles D.O.):  Patient was signed out to me, hemodynam stable, NAD, steady gait. Told by previous provider that SW gave patient resources for [facility] for outpatient addiction care services. At this time, patient does not warrant inpatient detox from methamphetamine given benign physical exam except for chronic RLE wound and stable hemodynamics. I discussed the care with SW on call Delilah who stated patient can f/u with [facility]. Gave number for home care [phone] w  name Promise Thompson. Also gave SBIRT flier that was emailed to me and contact info for appointments patient has with ID and vascular. Patient stated that home care suddenly stopped coming. Patient states he was told by  he needs inpatient detox. After discussing that this time, it does not appear indicated and patient can see help from an outpatient standpoint, the patient reviewed this with their  who stated to the patient that the  will discuss more about the patient tomorrow morning upon discharge. Patient w/o fevers. Wound was already packed with xerform. Additional xeroform dressing placed with sterile gauze and ace wrap. wound care instructions given to patient in interim with additional supplied. >2 hours spent facilitating care on this encounter. Attending (Sandoval Charles D.O.):  Patient was signed out to me, hemodynam stable, NAD, steady gait. Told by previous provider that SW gave patient resources for [facility] for outpatient addiction care services. At this time, patient does not warrant inpatient detox from methamphetamine given benign physical exam except for chronic RLE wound and stable hemodynamics. I discussed the care with SW on call Delilah who stated patient can f/u with [facility]. Gave number for home care [phone] w  name Promise Thompson. Also gave SBIRT flier that was emailed to me and contact info for appointments patient has with ID and vascular. Patient stated that home care suddenly stopped coming. Patient states he was told by  he needs inpatient detox. After discussing that this time, it does not appear indicated and patient can see help from an outpatient standpoint, the patient reviewed this with their  who stated to the patient that the  will discuss more about the patient tomorrow morning upon discharge. Patient w/o fevers. Wound was already packed with xerform. Additional xeroform dressing placed with sterile gauze and ace wrap. wound care instructions given to patient in interim with additional supplied. >2 hours spent facilitating care on this encounter.

## 2023-12-08 NOTE — ED ADULT NURSE NOTE - NSFALLLASTSIX_ED_ALL_ED
Head, normocephalic, atraumatic, Face, Face within normal limits, External ears within normal limits, External nose  normal appearance
No.

## 2023-12-08 NOTE — ED PROVIDER NOTE - PHYSICAL EXAMINATION
PE:  Gen: NAD  Head: NCAT  ENT: MMM  Chest: RRR, normal perfusion  Lungs: Symmetrical chest rise  Abdomen: ND  Ext: RLE with venous stasis/chronic skin changes, 2cm wound on medial aspect of lower RLE with scant serous drainage, nonmalodorous  Neuro: awake and alert, ambulatory with steady gait  Skin: no rashes

## 2023-12-08 NOTE — ED PROVIDER NOTE - OBJECTIVE STATEMENT
Irma Hunt, Attending Physician: 28 y.o male with PMHX of polysubstance abuse with recent discharge due to infected RLE wound BIB homeland security agents under arrest for medical clearance. Pt is not expected to be confined >24 hours. Pt has reportedly been compliant with antibiotics. No fevers, no increased discharge.

## 2023-12-08 NOTE — ED PROVIDER NOTE - PROVIDER TOKENS
PROVIDER:[TOKEN:[[id number]],FOLLOWUP:[1-3 Days],ESTABLISHEDPATIENT:[T]] PROVIDER:[TOKEN:[[id number]],FOLLOWUP:[1-3 Days],ESTABLISHEDPATIENT:[T]]

## 2023-12-08 NOTE — ED PROVIDER NOTE - CLINICAL SUMMARY MEDICAL DECISION MAKING FREE TEXT BOX
Marin Sanders MD (PGY-4):  Patient with history as above, presenting from court appearance earlier today where he was told he "needs to be put in inpatient detox" as a condition of his court agreement.  I explained to the patient that we do not offer inpatient detox services and provided him with resources per social work for options for inpatient detox which he will have to access as an outpatient.  He and his brother at bedside understand this. his main symptoms from withdrawal perspective are fatigue but no other acute complaints.  He does have a known right lower extremity ulcer which she is being treated for with oral antibiotics and has a plan to follow-up with wound care surgery.

## 2023-12-08 NOTE — ED ADULT NURSE NOTE - NS ED NURSE LEVEL OF CONSCIOUSNESS SPEECH
Subjective:       Patient ID: Shirley Tejada is a 58 y.o. female.    Chief Complaint: Sinus Problem      HPI    Mrs. Tejada is here today with c/o sinus issues x 1-1/2 weeks.  Reports fever, chills, sinus/facial pressure, RN, NC and cough.  Cough productive of thick green mucus, nasal drainage green also.  Thera-Flu and [place] Plus not helping.      Review of Systems   Constitutional: Positive for chills, diaphoresis, fatigue and fever.   HENT: Positive for congestion, postnasal drip, rhinorrhea and sinus pressure. Negative for ear pain, nosebleeds, sneezing and sore throat.    Eyes: Negative.    Respiratory: Positive for cough. Negative for shortness of breath and wheezing.    Cardiovascular: Negative.    Gastrointestinal: Negative for abdominal pain, nausea and vomiting.   Neurological: Negative.    Hematological: Negative for adenopathy.         Patient Active Problem List   Diagnosis    Essential hypertension    Multinodular goiter    Iatrogenic hypothyroidism    Bruxism    Vitamin D deficiency disease    Pre-diabetes    Metabolic syndrome         Objective:     Physical Exam   Constitutional: She is oriented to person, place, and time. She appears well-developed and well-nourished.   HENT:   Head: Normocephalic and atraumatic.   Right Ear: Tympanic membrane, external ear and ear canal normal.   Left Ear: Tympanic membrane, external ear and ear canal normal.   Nose: Mucosal edema and rhinorrhea present. No epistaxis. Right sinus exhibits maxillary sinus tenderness. Right sinus exhibits no frontal sinus tenderness. Left sinus exhibits maxillary sinus tenderness. Left sinus exhibits no frontal sinus tenderness.   Mouth/Throat: Oropharynx is clear and moist and mucous membranes are normal.   Eyes: Conjunctivae are normal. Pupils are equal, round, and reactive to light. Right eye exhibits no discharge. Left eye exhibits no discharge.   Cardiovascular: Normal rate, regular rhythm and normal heart  sounds.    Pulmonary/Chest: Effort normal and breath sounds normal.   Lymphadenopathy:     She has no cervical adenopathy.   Neurological: She is alert and oriented to person, place, and time.   Vitals reviewed.        Medication List with Changes/Refills   New Medications    DOXYCYCLINE (MONODOX) 100 MG CAPSULE    Take 1 capsule (100 mg total) by mouth 2 (two) times daily.    GUAIFENESIN-CODEINE 100-10 MG/5 ML (TUSSI-ORGANIDIN NR)  MG/5 ML SYRUP    Take 5 mLs by mouth 3 (three) times daily as needed for Cough or Congestion.   Current Medications    ASPIRIN (ECOTRIN) 81 MG EC TABLET    Take 81 mg by mouth once daily.    CETIRIZINE (ZYRTEC) 10 MG TABLET    TAKE 1 TABLET (10 MG TOTAL) BY MOUTH ONCE DAILY.    ESOMEPRAZOLE (NEXIUM) 40 MG CAPSULE    Take 1 capsule (40 mg total) by mouth before breakfast.    FLUTICASONE (FLONASE) 50 MCG/ACTUATION NASAL SPRAY    2 SPRAYS INTO EACH NOSTRIL EVERY DAY    FUROSEMIDE (LASIX) 20 MG TABLET        METFORMIN (GLUCOPHAGE-XR) 500 MG 24 HR TABLET    Take 1 tablet (500 mg total) by mouth 2 (two) times daily with meals.    MONTELUKAST (SINGULAIR) 10 MG TABLET    Take 1 tablet (10 mg total) by mouth every evening.   Changed and/or Refilled Medications    Modified Medication Previous Medication    LEVOTHYROXINE (SYNTHROID) 112 MCG TABLET levothyroxine (SYNTHROID) 112 MCG tablet       Take 1 tablet (112 mcg total) by mouth once daily.    Take 112 mcg by mouth once daily.    LISINOPRIL (PRINIVIL,ZESTRIL) 20 MG TABLET lisinopril (PRINIVIL,ZESTRIL) 20 MG tablet       Take 1 tablet (20 mg total) by mouth once daily.    Take 1 tablet (20 mg total) by mouth once daily.    VALACYCLOVIR (VALTREX) 1000 MG TABLET valacyclovir (VALTREX) 1000 MG tablet       TAKE 1 TABLET (1,000 MG TOTAL) BY MOUTH ONCE DAILY.    TAKE 1 TABLET (1,000 MG TOTAL) BY MOUTH ONCE DAILY.   Discontinued Medications    ACYCLOVIR (ZOVIRAX) 400 MG TABLET    Take 400 mg by mouth.    METFORMIN (GLUCOPHAGE) 500 MG TABLET     TAKE 1 TABLET (500 MG TOTAL) BY MOUTH 2 (TWO) TIMES DAILY WITH MEALS.    ONDANSETRON (ZOFRAN) 8 MG TABLET    Take 1 tablet (8 mg total) by mouth every 8 (eight) hours as needed for Nausea.           Diagnosis       1. Sinusitis, unspecified chronicity, unspecified location          Assessment/ Plan     Sinusitis, unspecified chronicity, unspecified location  -     doxycycline (MONODOX) 100 MG capsule; Take 1 capsule (100 mg total) by mouth 2 (two) times daily.  Dispense: 20 capsule; Refill: 0  -     guaifenesin-codeine 100-10 mg/5 ml (TUSSI-ORGANIDIN NR)  mg/5 mL syrup; Take 5 mLs by mouth 3 (three) times daily as needed for Cough or Congestion.  Dispense: 180 mL; Refill: 0    Other orders  -     levothyroxine (SYNTHROID) 112 MCG tablet; Take 1 tablet (112 mcg total) by mouth once daily.  Dispense: 30 tablet; Refill: 0  -     lisinopril (PRINIVIL,ZESTRIL) 20 MG tablet; Take 1 tablet (20 mg total) by mouth once daily.  Dispense: 30 tablet; Refill: 0  -     valacyclovir (VALTREX) 1000 MG tablet; TAKE 1 TABLET (1,000 MG TOTAL) BY MOUTH ONCE DAILY.  Dispense: 30 tablet; Refill: 0      Symptomatic care, rest, fluids, hydration.  Follow-up for annual wellness exam with roberto Hernandez.      TESS Moore  [facility]    Speaking Coherently

## 2023-12-08 NOTE — ED PROVIDER NOTE - ATTENDING CONTRIBUTION TO CARE
Hx: pt with recent ED visit for known RLE wound and methamphetamine misuse, last use 36hrs ago, here with brother with request for detox as mandated by court today.  No fever, vomiting, diarrhea, cp, sob reported.      PE: well appearing, nontoxic, appears slightly drowsy but arouses to voice.  Vitals appropriate. Nontoxic appearing, RLE erythema.      MDM: pt already on antibiotics and has f/u for RLE wound and cellulitis, reviewed cultures, antibx choice appropriate. Pt has no medical emergency for immediate medical treatment including no signs of life-threatening withdrawal syndrome. Pt given resources including SBIRT and detox center to be arranged outpatient but cannot be done through ER at this time.  No indication for inpatient medical treatment at this time.

## 2023-12-08 NOTE — ED ADULT NURSE NOTE - NSFALLUNIVINTERV_ED_ALL_ED
Bed/Stretcher in lowest position, wheels locked, appropriate side rails in place/Call bell, personal items and telephone in reach/Instruct patient to call for assistance before getting out of bed/chair/stretcher/Non-slip footwear applied when patient is off stretcher/[place] to call system/Physically safe environment - no spills, clutter or unnecessary equipment/Purposeful proactive rounding/Room/bathroom lighting operational, light cord in reach Bed/Stretcher in lowest position, wheels locked, appropriate side rails in place/Call bell, personal items and telephone in reach/Instruct patient to call for assistance before getting out of bed/chair/stretcher/Non-slip footwear applied when patient is off stretcher/[place] to call system/Physically safe environment - no spills, clutter or unnecessary equipment/Purposeful proactive rounding/Room/bathroom lighting operational, light cord in reach

## 2023-12-08 NOTE — ED PROVIDER NOTE - PATIENT PORTAL LINK FT
You can access the FollowMyHealth Patient Portal offered by [facility] by registering at the following website: [email or web address]. By joining [employer]’s FollowMyHealth portal, you will also be able to view your health information using other applications (apps) compatible with our system. You can access the FollowMyHealth Patient Portal offered by [facility] by registering at the following website: [email or web address]. By joining [employer]’s FollowMyHealth portal, you will also be able to view your health information using other applications (apps) compatible with our system.

## 2023-12-08 NOTE — ED PROVIDER NOTE - CLINICAL SUMMARY MEDICAL DECISION MAKING FREE TEXT BOX
28-year-old male with chronic right lower extremity findings recently treated for cellulitis without clinical evidence of acute cellulitis at this time.  There is no erythema or warmth.  There is mild tenderness however no crepitus to suggest neck Fasc at this time.  Patient reportedly compliant with meds.   is here with patient as he is currently under arrest and is expected to be under commitment for less than 24 hours.  I explained to the agents that should he be confined more than 24 hours, to have him evaluated for outpatient medications.

## 2023-12-08 NOTE — ED ADULT NURSE NOTE - NSFALLOOBATTEMPT_ED_ALL_ED
PEDIATRIC ENDOCRINOLOGY VISIT        Patient: George Thomas Date of Service: 2023   : 2011 MRN: [id number]     HISTORY OF PRESENT ILLNESS:  George Thomas is a 12 year old male who presents today for follow up regarding short stature and advanced bone age.  He has been healthy since his last visit except for allergies.  He has started new allergy medications.  No trips to ED or hospital.   In the past he underwent GH stimulation testing with adequate results.  He continues to be active in hockey and baseball.  His appetite and eating are same as always.   He has not had issues with gaining weight.  George denies recurrent headaches, abdominal pain, constipation or diarrhea.  He has a good energy level.  No issues with sleep.  No adult type odor.  No voice change.  No hair development.  George feels he has grown since his last visit.      Past Medical History:   Diagnosis Date   • Allergy to environmental factors        Birth History   • Birth     Length: 19\" (48.3 cm)     Weight: 3.459 kg (7 lb 10 oz)     Full term.  Home with mom.        Past Surgical History:   Procedure Laterality Date   • No past surgeries         Current Outpatient Medications   Medication Sig   • fluticasone-salmeterol (Advair Diskus) 100-50 MCG/ACT inhaler Inhale 1 puff into the lungs in the morning and 1 puff in the evening.   • ALBUTEROL IN    • cetirizine (ZyrTEC) 5 MG/5ML solution Orally   • fluticasone (Flonase Allergy Relief) 50 MCG/ACT nasal spray every 24 hours.   • montelukast (SINGULAIR) 5 MG chewable tablet Chew 5 mg by mouth nightly.     No current facility-administered medications for this visit.       ALLERGIES:   Allergen Reactions   • Seasonal Runny Nose     Seasonal Spring    • Dust Mite Extract Other (See Comments)       Family History   Problem Relation Age of Onset   • Patient is unaware of any medical problems Mother         5'1 inches tall; menses at 14 years of age   • Patient is unaware of any  medical problems Father         5'6 inches tall   • Patient is unaware of any medical problems Sister                          Family history of thyroid disease: No  Family history of Diabetes: No    Social History     Tobacco Use   • Smoking status: Never   • Smokeless tobacco: Never     Social History     Social History Narrative    In 5th grade    Does well in school    Plays baseball, hockey and golf    Enjoys in [place]        Developmental Hx: met all milestones       Review of Systems   Constitutional: Negative for activity change, appetite change, fatigue and unexpected weight change.   HENT: Negative for congestion and sore throat.    Eyes: Negative for visual disturbance.   Respiratory: Negative for cough and shortness of breath.    Cardiovascular: Negative for chest pain and palpitations.   Gastrointestinal: Negative for abdominal pain, constipation, diarrhea and vomiting.   Endocrine: Negative for cold intolerance and heat intolerance.   Genitourinary: Negative for urgency.   Musculoskeletal: Negative for myalgias.   Skin: Negative for rash.   Neurological: Negative for dizziness and headaches.   Hematological: Does not bruise/bleed easily.   Psychiatric/Behavioral: Negative for sleep disturbance.       Visit Vitals  /63   Pulse 83   Ht 4' 4.01\" (1.321 m)   Wt 31.6 kg (69 lb 10.7 oz)   BMI 18.11 kg/m²       Growth percentiles:   3 %ile (Z= -1.84) based on CDC (Boys, 2-20 Years) Stature-for-age data based on Stature recorded on 5/25/2023.   18 %ile (Z= -0.90) based on CDC (Boys, 2-20 Years) weight-for-age data using vitals from 5/25/2023.     BSA: 1.07 meters squared    Physical Exam  Constitutional:       General: He is active.      Appearance: He is well-developed.   HENT:      Head: Atraumatic.      Mouth/Throat:      Mouth: Mucous membranes are moist.      Pharynx: Oropharynx is clear.   Eyes:      Pupils: Pupils are equal, round, and reactive to light.   Cardiovascular:      Rate and Rhythm:  Regular rhythm.      Heart sounds: S1 normal and S2 normal.   Pulmonary:      Effort: Pulmonary effort is normal. No respiratory distress.      Breath sounds: Normal breath sounds.   Abdominal:      General: Bowel sounds are normal.      Palpations: Abdomen is soft.      Tenderness: There is no abdominal tenderness.   Genitourinary:     Comments: No axillary hair  Testicles 4 ml descended bilaterally  No pubic hair  Musculoskeletal:         General: Normal range of motion.      Cervical back: Normal range of motion and neck supple.   Skin:     General: Skin is warm and moist.      Findings: No rash.   Neurological:      Mental Status: He is alert.      Deep Tendon Reflexes: Reflexes normal.         Lab and Imaging Results  No results found for this or any previous visit (from the past 4200 hour(s)).     Assessment:    This is a 12 year old year-old male who presents with short stature and bone age older than actual age.  He is prepubertal on exam.  His current height is below the expected for family genetics and decreases even more dramatically when adjusted for bone age.  Predicted height based on bone age is about 61-62 inches.   He had normal results on a GH stimulation test.  His growth rate has increased slightly which may represent the onset of pubertal growth spurt.  Discussed diagnosis of idiopathic short stature and use of growth hormone given his predicted adult height.      1. Short stature    2. Advanced bone age        Plan and Patient Instructions:  1) Discussed with parents the interval growth George has had and recent bone age.  Discussed predicted height and use of growth hormone as his predicted height is well below his genetic potential and under the growth chart.        2) Encourage continued healthy food choices and continued weight gain.     3) Discussed obtaining a second opinion.  Information given for Dr. Stearns and Dr. Farris    4) Would plan for use of growth hormone 1.2 mg once a day.   Once growth hormone approved plan for MRI of brain and pituitary w/wo contrast.      5) Repeat bone age yearly     6) Discussed possible use of aromatase inhibitor in future depending on growth and use of growth hormone     7) Follow up in 6 months.       Return in about 6 months (around 11/25/2023).    Instructions provided as documented in the AVS.    The patient, mother and father indicated understanding of the diagnosis and agreed with the plan of care.               No

## 2023-12-08 NOTE — ED ADULT TRIAGE NOTE - CHIEF COMPLAINT QUOTE
pt is under arrest accompanied by  with homeland security; here to eval for fit for confinement;  pain RLL; on oral antibiotics for infection to ulcer RLE pt is handcuffed and under arrest accompanied by  with homeland security; here to eval for fit for confinement;  pain RLL; on oral antibiotics for infection to ulcer RLE

## 2023-12-08 NOTE — ED PROVIDER NOTE - NSFOLLOWUPINSTRUCTIONS_ED_ALL_ED_FT
You were seen in the Emergency Department for Fatigue in the setting of withdrawal from methamphetamine.  Please follow-up with inpatient detox centers via the resources provided to you by our  for further information and management of your detox    1) Advance activity as tolerated.   2) Continue all previously prescribed medications as directed.    3) Follow up with your primary care physician in 24-48 hours - take copies of your results.    4) Return to the Emergency Department for worsening or persistent symptoms, and/or ANY NEW OR CONCERNING SYMPTOMS. You were seen in the Emergency Department for Fatigue in the setting of withdrawal from methamphetamine.  Please follow-up with inpatient detox centers via the resources provided to you by our  for further information and management of your Withdrawal symptoms and treatment for detoxification.    1) Advance activity as tolerated.   2) Continue all previously prescribed medications as directed.    3) Follow up with your primary care physician in 24-48 hours - take copies of your results.    4) Return to the Emergency Department for worsening or persistent symptoms, and/or ANY NEW OR CONCERNING SYMPTOMS. You were seen in the Emergency Department for Fatigue in the setting of withdrawal from methamphetamine.  Please follow-up with inpatient detox centers via the resources provided to you by our  for further information and management of your Withdrawal symptoms and treatment for detoxification.    1) Advance activity as tolerated.   2) Continue all previously prescribed medications as directed.    3) Follow up with your primary care physician in 24-48 hours - take copies of your results.    4) Return to the Emergency Department for worsening or persistent symptoms, and/or ANY NEW OR CONCERNING SYMPTOMS.    Brandon Salomon  Vascular Surgery  [address] [place] [address], [address]  [place] [address]  Phone: [phone]  Fax: [phone]  Follow Up Time: 1-3 days    Bryan Valiente  Infectious Disease  [address] [place] [address]  Phone: [phone]  Fax: [phone]  Follow Up Time: 1-3 days    Care Coordination Discharge Planning	Home Care Agency  Home Care Agency Name	[facility]  Contact Number	[phone]  Type of Service	visiting nurse  's Name:	Promise Thompson RN You were seen in the Emergency Department for Fatigue in the setting of withdrawal from methamphetamine.  Please follow-up with inpatient detox centers via the resources provided to you by our  for further information and management of your Withdrawal symptoms and treatment for detoxification.    1) Advance activity as tolerated.   2) Continue all previously prescribed medications as directed.    3) Follow up with your primary care physician in 24-48 hours - take copies of your results.    4) Return to the Emergency Department for worsening or persistent symptoms, and/or ANY NEW OR CONCERNING SYMPTOMS.    Brandon Salomon  Vascular Surgery  [address] [place] [address], [address]  [place] [address]  Phone: [phone]  Fax: [phone]  Follow Up Time: 1-3 days    Bryan Valiente  Infectious Disease  [address] [place] [address]  Phone: [phone]  Fax: [phone]  Follow Up Time: 1-3 days    Care Coordination Discharge Planning	Home Care Agency  Home Care Agency Name	[facility]  Contact Number	[phone]  Type of Service	visiting nurse  's Name:	Promise Thompson RN

## 2023-12-08 NOTE — ED PROVIDER NOTE - PATIENT PORTAL LINK FT
You can access the FollowMyHealth Patient Portal offered by [facility] by registering at the following website: [email or web address]. By joining [employer]’s FollowMyHealth portal, you will also be able to view your health information using other applications (apps) compatible with our system. You can access the FollowMyHealth Patient Portal offered by [facility] by registering at the following website: [email or web address]. By joining [employer]’s FollowMyHealth portal, you will also be able to view your health information using other applications (apps) compatible with our system.

## 2023-12-08 NOTE — ED ADULT NURSE NOTE - OBJECTIVE STATEMENT
Pt is a 28 y.o male c.o infected RLE. Pt is A&Ox3 and resting in stretcher. PT brought in with homeland security agents and is under arrest pending medical clearance of his wound. PT has RLE wound wrapped and has been taking prescribed abx. Spontaneously breathing on RA >95%, RLE wound 2cm wound wrapped and redressed by Dr. Miller, belongings held with [employer] security agents at bedside. PT denies any fever, chills, N/V/D, CP or SOB. Safety and comfort measures in place bed in lowest position, siderails upright and call bell within reach. Pt is a 28 y.o male c.o infected RLE. Pt is A&Ox3 and resting in stretcher. PT brought in with homeland security agents and is under arrest pending medical clearance of his wound. PT has RLE wound wrapped and has been taking prescribed abx. Spontaneously breathing on RA >95%, RLE wound 2cm wound wrapped and redressed by Dr. Miller, belongings held with [employer] security agents at bedside. PT denies any fever, chills, N/V/D, CP or SOB. Safety and comfort measures in place bed in lowest position, siderails upright and call bell within reach.

## 2023-12-08 NOTE — ED ADULT NURSE NOTE - NSFALLUNIVINTERV_ED_ALL_ED
Bed/Stretcher in lowest position, wheels locked, appropriate side rails in place/Call bell, personal items and telephone in reach/Instruct patient to call for assistance before getting out of bed/chair/stretcher/Non-slip footwear applied when patient is off stretcher/[place] to call system/Physically safe environment - no spills, clutter or unnecessary equipment/Purposeful proactive rounding/Room/bathroom lighting operational, light cord in reach Bed/Stretcher in lowest position, wheels locked, appropriate side rails in place/Call bell, personal items and telephone in reach/Instruct patient to call for assistance before getting out of bed/chair/stretcher/Non-slip footwear applied when patient is off stretcher/[place] to call system/Physically safe environment - no spills, clutter or unnecessary equipment/Purposeful proactive rounding/Room/bathroom lighting operational, light cord in reach

## 2023-12-08 NOTE — ED PROVIDER NOTE - PHYSICAL EXAMINATION
PHYSICAL EXAM:  GENERAL: Sitting comfortable in bed, in no acute distress  HENMT: Atraumatic, moist mucous membranes, no oropharyngeal exudates or vesicles, uvula is midline EYES: Clear bilaterally, PERRL, EOMs intact b/l  HEART: RRR, S1/S2, no murmur/gallops/rubs  RESPIRATORY: Clear to auscultation bilaterally, no wheezes/rhonchi/rales  ABDOMEN: +BS, soft, nontender, nondistended, no rebound, no guarding  EXTREMITIES: Right lower extremity edema, chronic venous stasis changes medial quarter sized ulcer without signs of acute erythema or drainage., +2 radial pulses b/l,   NEURO:  A&Ox4, no focal motor deficits or sensory deficits   Heme/LYMPH: No ecchymosis or bruising  SKIN:  Skin normal color, warm, dry and intact. No evidence of rash.

## 2023-12-08 NOTE — ED PROVIDER NOTE - CARE PROVIDER_API CALL
Lin Berrios  [facility] Medicine  [address] [place] [address], [address]  [place] [address]  Phone: [phone]  Fax: [phone]  Established Patient  Follow Up Time: 1-3 Days   Lin Berrios  [facility] Medicine  [address] [place] [address], [address]  [place] [address]  Phone: [phone]  Fax: [phone]  Established Patient  Follow Up Time: 1-3 Days

## 2023-12-08 NOTE — ED PROVIDER NOTE - CARE PLAN
Principal Discharge DX:	Substance abuse   1 Principal Discharge DX:	Encounter for medical screening examination

## 2023-12-09 VITALS
DIASTOLIC BLOOD PRESSURE: 79 MMHG | TEMPERATURE: 98 F | SYSTOLIC BLOOD PRESSURE: 118 MMHG | OXYGEN SATURATION: 100 % | HEART RATE: 74 BPM | RESPIRATION RATE: 15 BRPM

## 2023-12-12 ENCOUNTER — APPOINTMENT (OUTPATIENT)
Dept: DERMATOLOGY | Facility: CLINIC | Age: 28
End: 2023-12-12

## 2023-12-12 ENCOUNTER — APPOINTMENT (OUTPATIENT)
Dept: WOUND CARE | Facility: CLINIC | Age: 28
End: 2023-12-12

## 2023-12-13 ENCOUNTER — APPOINTMENT (OUTPATIENT)
Dept: INFECTIOUS DISEASE | Facility: CLINIC | Age: 28
End: 2023-12-13

## 2023-12-15 ENCOUNTER — APPOINTMENT (OUTPATIENT)
Dept: INTERNAL MEDICINE | Facility: CLINIC | Age: 28
End: 2023-12-15

## 2024-01-05 PROBLEM — I87.2 VENOUS INSUFFICIENCY (CHRONIC) (PERIPHERAL): Chronic | Status: ACTIVE | Noted: 2023-11-07

## 2024-01-05 PROBLEM — F19.10 OTHER PSYCHOACTIVE SUBSTANCE ABUSE, UNCOMPLICATED: Chronic | Status: ACTIVE | Noted: 2023-11-28

## 2024-01-08 ENCOUNTER — APPOINTMENT (OUTPATIENT)
Dept: INTERNAL MEDICINE | Facility: CLINIC | Age: 29
End: 2024-01-08
Payer: MEDICAID

## 2024-01-08 VITALS
HEIGHT: 71 IN | TEMPERATURE: 97.5 F | WEIGHT: 215 LBS | HEART RATE: 108 BPM | DIASTOLIC BLOOD PRESSURE: 88 MMHG | OXYGEN SATURATION: 98 % | BODY MASS INDEX: 30.1 KG/M2 | SYSTOLIC BLOOD PRESSURE: 166 MMHG

## 2024-01-08 VITALS — DIASTOLIC BLOOD PRESSURE: 80 MMHG | SYSTOLIC BLOOD PRESSURE: 132 MMHG

## 2024-01-08 VITALS — HEART RATE: 90 BPM

## 2024-01-08 DIAGNOSIS — Z09 ENCOUNTER FOR FOLLOW-UP EXAMINATION AFTER COMPLETED TREATMENT FOR CONDITIONS OTHER THAN MALIGNANT NEOPLASM: ICD-10-CM

## 2024-01-08 DIAGNOSIS — S81.801A UNSPECIFIED OPEN WOUND, RIGHT LOWER LEG, INITIAL ENCOUNTER: ICD-10-CM

## 2024-01-08 DIAGNOSIS — R22.42 LOCALIZED SWELLING, MASS AND LUMP, LEFT LOWER LIMB: ICD-10-CM

## 2024-01-08 PROCEDURE — 99495 TRANSJ CARE MGMT MOD F2F 14D: CPT | Mod: 25

## 2024-01-08 PROCEDURE — 36415 COLL VENOUS BLD VENIPUNCTURE: CPT

## 2024-01-09 LAB
ALBUMIN SERPL ELPH-MCNC: 5.1 G/DL
ALP BLD-CCNC: 78 U/L
ALT SERPL-CCNC: 23 U/L
ANION GAP SERPL CALC-SCNC: 13 MMOL/L
AST SERPL-CCNC: 22 U/L
BASOPHILS # BLD AUTO: 0.03 K/UL
BASOPHILS NFR BLD AUTO: 0.4 %
BILIRUB SERPL-MCNC: 0.2 MG/DL
BUN SERPL-MCNC: 14 MG/DL
CALCIUM SERPL-MCNC: 10.1 MG/DL
CHLORIDE SERPL-SCNC: 99 MMOL/L
CO2 SERPL-SCNC: 26 MMOL/L
CREAT SERPL-MCNC: 0.79 MG/DL
EGFR: 125 ML/MIN/1.73M2
EOSINOPHIL # BLD AUTO: 0.1 K/UL
EOSINOPHIL NFR BLD AUTO: 1.4 %
GLUCOSE SERPL-MCNC: 84 MG/DL
HCT VFR BLD CALC: 49.5 %
HGB BLD-MCNC: 15.6 G/DL
IMM GRANULOCYTES NFR BLD AUTO: 0.3 %
LYMPHOCYTES # BLD AUTO: 1.5 K/UL
LYMPHOCYTES NFR BLD AUTO: 20.7 %
MAN DIFF?: NORMAL
MCHC RBC-ENTMCNC: 27.4 PG
MCHC RBC-ENTMCNC: 31.5 GM/DL
MCV RBC AUTO: 87 FL
MONOCYTES # BLD AUTO: 0.9 K/UL
MONOCYTES NFR BLD AUTO: 12.4 %
NEUTROPHILS # BLD AUTO: 4.69 K/UL
NEUTROPHILS NFR BLD AUTO: 64.8 %
PLATELET # BLD AUTO: 317 K/UL
POTASSIUM SERPL-SCNC: 4.5 MMOL/L
PROT SERPL-MCNC: 8.1 G/DL
RBC # BLD: 5.69 M/UL
RBC # FLD: 14 %
SODIUM SERPL-SCNC: 138 MMOL/L
WBC # FLD AUTO: 7.24 K/UL

## 2024-01-15 PROBLEM — S81.801A LEG WOUND, RIGHT: Status: ACTIVE | Noted: 2023-07-19

## 2024-01-15 PROBLEM — Z09 HOSPITAL DISCHARGE FOLLOW-UP: Status: ACTIVE | Noted: 2024-01-15

## 2024-01-15 NOTE — PHYSICAL EXAM
[No Acute Distress] : no acute distress [Well-Appearing] : well-appearing [Normal Sclera/Conjunctiva] : normal sclera/conjunctiva [EOMI] : extraocular movements intact [No Respiratory Distress] : no respiratory distress  [No Accessory Muscle Use] : no accessory muscle use [Clear to Auscultation] : lungs were clear to auscultation bilaterally [Normal Rate] : normal rate  [Regular Rhythm] : with a regular rhythm [Normal S1, S2] : normal S1 and S2 [Soft] : abdomen soft [Non Tender] : non-tender [Non-distended] : non-distended [Normal Bowel Sounds] : normal bowel sounds [Normal Affect] : the affect was normal [de-identified] : R medial leg with chronic wound, no s/s of acute infection, granulation tissue noted 1 x2cm. Palpable nodule of L medial calf- present for 1 month per patient.  [71152 - Moderate Complexity requires multiple possible diagnoses and/or the management options, moderate complexity of the medical data (tests, etc.) to be reviewed, and moderate risk of significant complications, morbidity, and/or mortality as well as co] : Moderate Complexity
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
## 2024-01-15 NOTE — ASSESSMENT
[FreeTextEntry1] : HFU - Admitted to [facility] 11/28-12/1 with worsening right lower extremity wound. Wound cx grew pseudomonas, proteus, enterococcus, E. coli and MSSA. Initially given zosyn which was transitioned to cipro and Augmentin on discharge (completed). - Presented back to [facility] ED 12/8 requesting inpatient detox but was Dc'ed as he did not meet criteria. - Subsequently accepted at [facility] for Recovery from 12/10-1/7 and underwent inpatient detox from meth and cocaine - Scheduled for outpatient rehab follow up tmrw - BP initially elevated but improved on repeat- started on lisinopril and clonidine at rehab which he will  from pharmacy - Reiterated need to make follow up appts with vascular, wound care, infectious disease - CBC and CMP ordered - Palpable nodule of L medial calf x1 month- US ordered

## 2024-01-16 ENCOUNTER — APPOINTMENT (OUTPATIENT)
Dept: INTERNAL MEDICINE | Facility: CLINIC | Age: 29
End: 2024-01-16

## 2024-02-14 ENCOUNTER — APPOINTMENT (OUTPATIENT)
Dept: INFECTIOUS DISEASE | Facility: CLINIC | Age: 29
End: 2024-02-14

## 2024-02-22 ENCOUNTER — APPOINTMENT (OUTPATIENT)
Dept: WOUND CARE | Facility: CLINIC | Age: 29
End: 2024-02-22
Payer: MEDICAID

## 2024-02-22 ENCOUNTER — OUTPATIENT (OUTPATIENT)
Dept: OUTPATIENT SERVICES | Facility: HOSPITAL | Age: 29
LOS: 1 days | End: 2024-02-22
Payer: MEDICAID

## 2024-02-22 VITALS
SYSTOLIC BLOOD PRESSURE: 149 MMHG | RESPIRATION RATE: 18 BRPM | HEART RATE: 101 BPM | TEMPERATURE: 98.3 F | DIASTOLIC BLOOD PRESSURE: 91 MMHG

## 2024-02-22 DIAGNOSIS — L20.9 ATOPIC DERMATITIS, UNSPECIFIED: ICD-10-CM

## 2024-02-22 DIAGNOSIS — M79.89 OTHER SPECIFIED SOFT TISSUE DISORDERS: ICD-10-CM

## 2024-02-22 DIAGNOSIS — F19.10 OTHER PSYCHOACTIVE SUBSTANCE ABUSE, UNCOMPLICATED: ICD-10-CM

## 2024-02-22 DIAGNOSIS — F17.200 NICOTINE DEPENDENCE, UNSPECIFIED, UNCOMPLICATED: ICD-10-CM

## 2024-02-22 DIAGNOSIS — Z82.49 FAMILY HISTORY OF ISCHEMIC HEART DISEASE AND OTHER DISEASES OF THE CIRCULATORY SYSTEM: ICD-10-CM

## 2024-02-22 DIAGNOSIS — I87.2 VENOUS INSUFFICIENCY (CHRONIC) (PERIPHERAL): ICD-10-CM

## 2024-02-22 PROCEDURE — G0463: CPT

## 2024-02-22 PROCEDURE — 99203 OFFICE O/P NEW LOW 30 MIN: CPT

## 2024-02-22 PROCEDURE — 99213 OFFICE O/P EST LOW 20 MIN: CPT

## 2024-02-22 RX ORDER — TRIAMCINOLONE ACETONIDE 1 MG/G
0.1 CREAM TOPICAL TWICE DAILY
Qty: 1 | Refills: 2 | Status: ACTIVE | COMMUNITY
Start: 2024-02-22 | End: 1900-01-01

## 2024-02-26 PROBLEM — F17.200 CURRENT SMOKER: Status: ACTIVE | Noted: 2023-08-29

## 2024-02-26 PROBLEM — I87.2 STASIS DERMATITIS: Status: ACTIVE | Noted: 2023-07-18

## 2024-02-26 PROBLEM — Z82.49 FAMILY HISTORY OF HYPERTENSION: Status: ACTIVE | Noted: 2023-08-29

## 2024-02-26 PROBLEM — M79.89 LEG SWELLING: Status: ACTIVE | Noted: 2023-09-13

## 2024-02-26 PROBLEM — F19.10 SUBSTANCE ABUSE: Status: ACTIVE | Noted: 2023-07-19

## 2024-02-26 PROBLEM — L20.9 DERMATITIS, ATOPIC: Status: ACTIVE | Noted: 2023-09-12
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
## 2024-02-26 NOTE — ASSESSMENT
[FreeTextEntry1] : 28 yr m with chronic leg wound on RLE x months started as a cut and never healed, h/o swelling of this leg and multiple courses of abx/ admissions for abx with improvement   recent trauma to the area   ABIs wnl, s/p venous duplex and ablation. Says he was told he needed venous ablation- no notes seen in allscripts. -- endorses use of meth x 10 years  currently dry not draining, cleansed and  triamcinolone for puritis/  dry skin sween applied  datacomplexity mod lab, xr, old rec, test resultsreview,, visualize imagecptreview previous  risk-low surgery  suppplies ordered- compression/socks

## 2024-02-26 NOTE — PHYSICAL EXAM
[JVD] : no jugular venous distention  [Normal Breath Sounds] : Normal breath sounds [Normal Rate and Rhythm] : normal rate and rhythm [2+] : left 2+ [] : present [Ankle Swelling (On Exam)] : present [Abdomen Tenderness] : ~T ~M No abdominal tenderness [Skin Ulcer] : ulcer [Alert] : alert [Oriented to Person] : oriented to person [Oriented to Place] : oriented to place [Calm] : calm [Oriented to Time] : oriented to time [de-identified] : nad a/ox3 [de-identified] : rom intact [de-identified] : perrla non icteric [Please See PDF for [employer]] : Please See PDF for [employer].

## 2024-03-12 DIAGNOSIS — Z82.49 FAMILY HISTORY OF ISCHEMIC HEART DISEASE AND OTHER DISEASES OF THE CIRCULATORY SYSTEM: ICD-10-CM

## 2024-03-12 DIAGNOSIS — L20.9 ATOPIC DERMATITIS, UNSPECIFIED: ICD-10-CM

## 2024-03-12 DIAGNOSIS — F17.200 NICOTINE DEPENDENCE, UNSPECIFIED, UNCOMPLICATED: ICD-10-CM

## 2024-03-12 DIAGNOSIS — M79.89 OTHER SPECIFIED SOFT TISSUE DISORDERS: ICD-10-CM

## 2024-03-12 DIAGNOSIS — F19.10 OTHER PSYCHOACTIVE SUBSTANCE ABUSE, UNCOMPLICATED: ICD-10-CM

## 2024-03-12 DIAGNOSIS — I87.2 VENOUS INSUFFICIENCY (CHRONIC) (PERIPHERAL): ICD-10-CM

## 2024-04-03 ENCOUNTER — APPOINTMENT (OUTPATIENT)
Dept: DERMATOLOGY | Facility: CLINIC | Age: 29
End: 2024-04-03
Payer: MEDICAID

## 2024-04-03 DIAGNOSIS — L70.9 ACNE, UNSPECIFIED: ICD-10-CM

## 2024-04-03 PROCEDURE — 99214 OFFICE O/P EST MOD 30 MIN: CPT

## 2024-04-03 RX ORDER — TRETINOIN 0.25 MG/G
0.03 CREAM TOPICAL
Qty: 1 | Refills: 3 | Status: ACTIVE | COMMUNITY
Start: 2024-04-03 | End: 1900-01-01

## 2024-04-03 RX ORDER — TRIAMCINOLONE ACETONIDE 1 MG/G
0.1 OINTMENT TOPICAL
Qty: 1 | Refills: 0 | Status: ACTIVE | COMMUNITY
Start: 2024-04-03 | End: 1900-01-01

## 2024-04-03 RX ORDER — BENZOYL PEROXIDE 100 MG/ML
10 LIQUID TOPICAL DAILY
Qty: 1 | Refills: 6 | Status: ACTIVE | COMMUNITY
Start: 2024-04-03 | End: 1900-01-01

## 2024-04-03 RX ORDER — CLINDAMYCIN PHOSPHATE 10 MG/ML
1 LOTION TOPICAL
Qty: 1 | Refills: 6 | Status: ACTIVE | COMMUNITY
Start: 2024-04-03 | End: 1900-01-01

## 2024-04-03 NOTE — PHYSICAL EXAM
[Alert] : alert [Oriented x 3] : ~L oriented x 3 [Well Nourished] : well nourished [Conjunctiva Non-injected] : conjunctiva non-injected [No Visual Lymphadenopathy] : no visual  lymphadenopathy [No Clubbing] : no clubbing [No Edema] : no edema [No Bromhidrosis] : no bromhidrosis [No Chromhidrosis] : no chromhidrosis [FreeTextEntry3] : brawny discoloration and overlying scale RLE inflammatory papules and pustules on face w/ scarring

## 2024-04-03 NOTE — HISTORY OF PRESENT ILLNESS
[FreeTextEntry1] : RPV: wound on RLE  [de-identified] : SIRENA ENG is a 28 year old who is presenting for evaluation of:   1. Wound on RLE thought to be venous, started as trauma, LV 11/2023, at that time was actively using meth (+10 yr history), since LV he has been SOBER X 3MONTHS after attending rehab. He feels great. Leg wound healed on its own. Leg only w/ mild itch at time.   2. Acne, face, embarrassed by it, not treating

## 2024-04-03 NOTE — ASSESSMENT
[FreeTextEntry1] : #RLE ulcer, likely vascular, closed now under the setting of stopping methamphetamine, sober for 3 month #stasis dermatitis, chronic, flaring - education, counseling - START triamcinolone 0.1% ointment BID to affected area only, no more than 2 weeks, avoid face and groin - r/b/a topical steroids were discussed including but not limited to thinning of the skin, atrophy and dyspigmentation.  #acne, inflammatory w/scarring, chronic, flaring - education, counseling - BP wash - clinda lotion - START tretinoin .025% qHS.  - Pt instructed on how to apply topical tretinoin (pea sized amount to entire face). Advised on the potential side effects of topical retinoids including redness, irritation and peeling of the skin, and how using an oil-free moisturizer after application or escalating up the dosing frequency to nightly (i.e. starting with every other night) may help mitigate these side effects. Also explained that a treatment duration of 6 weeks is typically required to achieve significant results. - consider orals at [place]

## 2024-07-03 ENCOUNTER — APPOINTMENT (OUTPATIENT)
Dept: DERMATOLOGY | Facility: CLINIC | Age: 29
End: 2024-07-03
Payer: MEDICAID

## 2024-07-03 DIAGNOSIS — I87.2 VENOUS INSUFFICIENCY (CHRONIC) (PERIPHERAL): ICD-10-CM

## 2024-07-03 DIAGNOSIS — L20.9 ATOPIC DERMATITIS, UNSPECIFIED: ICD-10-CM

## 2024-07-03 DIAGNOSIS — L70.9 ACNE, UNSPECIFIED: ICD-10-CM

## 2024-07-03 PROCEDURE — 99214 OFFICE O/P EST MOD 30 MIN: CPT

## 2024-07-16 RX ORDER — MOMETASONE FUROATE 1 MG/G
0.1 OINTMENT TOPICAL
Qty: 1 | Refills: 3 | Status: ACTIVE | COMMUNITY
Start: 2024-07-03 | End: 1900-01-01

## 2024-07-16 RX ORDER — TRETINOIN 0.5 MG/G
0.05 CREAM TOPICAL
Qty: 1 | Refills: 11 | Status: ACTIVE | COMMUNITY
Start: 2024-07-03 | End: 1900-01-01

## 2024-10-10 ENCOUNTER — APPOINTMENT (OUTPATIENT)
Dept: DERMATOLOGY | Facility: CLINIC | Age: 29
End: 2024-10-10

## 2025-03-31 ENCOUNTER — APPOINTMENT (OUTPATIENT)
Dept: INTERNAL MEDICINE | Facility: CLINIC | Age: 30
End: 2025-03-31
Payer: MEDICAID

## 2025-03-31 VITALS — DIASTOLIC BLOOD PRESSURE: 72 MMHG | SYSTOLIC BLOOD PRESSURE: 130 MMHG

## 2025-03-31 VITALS
TEMPERATURE: 98.4 F | OXYGEN SATURATION: 97 % | HEART RATE: 85 BPM | SYSTOLIC BLOOD PRESSURE: 141 MMHG | DIASTOLIC BLOOD PRESSURE: 82 MMHG | WEIGHT: 199 LBS | BODY MASS INDEX: 27.86 KG/M2 | HEIGHT: 71 IN

## 2025-03-31 DIAGNOSIS — L20.9 ATOPIC DERMATITIS, UNSPECIFIED: ICD-10-CM

## 2025-03-31 DIAGNOSIS — R09.82 POSTNASAL DRIP: ICD-10-CM

## 2025-03-31 DIAGNOSIS — Z11.3 ENCOUNTER FOR SCREENING FOR INFECTIONS WITH A PREDOMINANTLY SEXUAL MODE OF TRANSMISSION: ICD-10-CM

## 2025-03-31 DIAGNOSIS — I87.2 VENOUS INSUFFICIENCY (CHRONIC) (PERIPHERAL): ICD-10-CM

## 2025-03-31 DIAGNOSIS — R41.840 ATTENTION AND CONCENTRATION DEFICIT: ICD-10-CM

## 2025-03-31 DIAGNOSIS — Z00.00 ENCOUNTER FOR GENERAL ADULT MEDICAL EXAMINATION W/OUT ABNORMAL FINDINGS: ICD-10-CM

## 2025-03-31 PROCEDURE — 36415 COLL VENOUS BLD VENIPUNCTURE: CPT

## 2025-03-31 PROCEDURE — 99395 PREV VISIT EST AGE 18-39: CPT

## 2025-03-31 RX ORDER — FLUTICASONE PROPIONATE 50 UG/1
50 SPRAY, METERED NASAL
Qty: 1 | Refills: 0 | Status: ACTIVE | COMMUNITY
Start: 2025-03-31 | End: 1900-01-01

## 2025-04-01 LAB
25(OH)D3 SERPL-MCNC: 22.5 NG/ML
ALBUMIN SERPL ELPH-MCNC: 4.6 G/DL
ALP BLD-CCNC: 69 U/L
ALT SERPL-CCNC: 19 U/L
ANION GAP SERPL CALC-SCNC: 14 MMOL/L
AST SERPL-CCNC: 21 U/L
BILIRUB SERPL-MCNC: 0.3 MG/DL
BUN SERPL-MCNC: 16 MG/DL
C TRACH RRNA SPEC QL NAA+PROBE: NOT DETECTED
CALCIUM SERPL-MCNC: 9.6 MG/DL
CHLORIDE SERPL-SCNC: 103 MMOL/L
CHOLEST SERPL-MCNC: 186 MG/DL
CO2 SERPL-SCNC: 25 MMOL/L
CREAT SERPL-MCNC: 1.22 MG/DL
EGFRCR SERPLBLD CKD-EPI 2021: 83 ML/MIN/1.73M2
ESTIMATED AVERAGE GLUCOSE: 117 MG/DL
GLUCOSE SERPL-MCNC: 106 MG/DL
HAV IGM SER QL: NONREACTIVE
HBA1C MFR BLD HPLC: 5.7 %
HBV CORE IGM SER QL: NONREACTIVE
HBV SURFACE AG SER QL: NONREACTIVE
HCT VFR BLD CALC: 47.2 %
HCV AB SER QL: NONREACTIVE
HCV S/CO RATIO: 0.1 S/CO
HDLC SERPL-MCNC: 50 MG/DL
HGB BLD-MCNC: 15.2 G/DL
LDLC SERPL-MCNC: 111 MG/DL
MCHC RBC-ENTMCNC: 28.4 PG
MCHC RBC-ENTMCNC: 32.2 G/DL
MCV RBC AUTO: 88.2 FL
N GONORRHOEA RRNA SPEC QL NAA+PROBE: NOT DETECTED
NONHDLC SERPL-MCNC: 136 MG/DL
PLATELET # BLD AUTO: 363 K/UL
POTASSIUM SERPL-SCNC: 4.3 MMOL/L
PROT SERPL-MCNC: 7.6 G/DL
RBC # BLD: 5.35 M/UL
RBC # FLD: 13.2 %
SODIUM SERPL-SCNC: 142 MMOL/L
SOURCE AMPLIFICATION: NORMAL
T PALLIDUM AB SER QL IA: NEGATIVE
TRIGL SERPL-MCNC: 140 MG/DL
TSH SERPL-ACNC: 1.44 UIU/ML
WBC # FLD AUTO: 9.71 K/UL

## 2025-04-02 LAB — HIV1+2 AB SPEC QL IA.RAPID: NONREACTIVE

## 2025-04-15 NOTE — ED ADULT TRIAGE NOTE - BP NONINVASIVE DIASTOLIC (MM HG)
Reviewed mom's message with Dr. Duong. She stated these sound like they could be abdominal migraines. She would like to know if Monroe is also experiencing headaches with these episodes. If yes, then start on cyproheptadine 4mg nightly with an update in 2-3 weeks. She would also like to see him in clinic in June for a follow up.     Call placed to mom. She stated that Monroe did have a headache yesterday during the episode. Mom is in agreement with the plan as long as Dr. Duong does not think Same needs to be seen by Dr. Mena prior to June. Medication sent to preferred pharmacy. Appointment scheduled and care advice to help stomach pain today sent via E-Advice message.     
53

## 2025-07-21 ENCOUNTER — RX RENEWAL (OUTPATIENT)
Age: 30
End: 2025-07-21

## 2025-07-21 RX ORDER — BENZOYL PEROXIDE 10 G/100G
10 SUSPENSION TOPICAL
Qty: 148 | Refills: 0 | Status: ACTIVE | COMMUNITY
Start: 2025-07-21 | End: 1900-01-01

## 2025-08-08 NOTE — ED PROVIDER NOTE - INTERNATIONAL TRAVEL
"Subjective   Patient ID: Félix Butcher is a 62 y.o. male who presents for Sick Visit.    Presents for discussion on anxiety.   Has had increased anxiety recently mostly with enclosed spaces presumably claustrophobia.          Review of Systems   All other systems reviewed and are negative.      Objective   /80   Pulse 82   Ht 1.753 m (5' 9\")   Wt 84.8 kg (187 lb)   SpO2 98%   BMI 27.62 kg/m²     Physical Exam    Assessment/Plan   Diagnoses and all orders for this visit:  Claustrophobia         "
No